# Patient Record
Sex: FEMALE | Race: WHITE | NOT HISPANIC OR LATINO | Employment: FULL TIME | URBAN - METROPOLITAN AREA
[De-identification: names, ages, dates, MRNs, and addresses within clinical notes are randomized per-mention and may not be internally consistent; named-entity substitution may affect disease eponyms.]

---

## 2016-12-27 LAB
EXTERNAL HIV SCREEN: NORMAL
HCV AB SER-ACNC: NEGATIVE

## 2019-10-11 ENCOUNTER — HOSPITAL ENCOUNTER (EMERGENCY)
Facility: HOSPITAL | Age: 39
Discharge: HOME | End: 2019-10-11
Attending: EMERGENCY MEDICINE
Payer: COMMERCIAL

## 2019-10-11 ENCOUNTER — APPOINTMENT (EMERGENCY)
Dept: RADIOLOGY | Facility: HOSPITAL | Age: 39
End: 2019-10-11
Payer: COMMERCIAL

## 2019-10-11 VITALS
BODY MASS INDEX: 21.28 KG/M2 | OXYGEN SATURATION: 100 % | HEIGHT: 67 IN | HEART RATE: 108 BPM | SYSTOLIC BLOOD PRESSURE: 102 MMHG | WEIGHT: 135.58 LBS | DIASTOLIC BLOOD PRESSURE: 62 MMHG | RESPIRATION RATE: 18 BRPM | TEMPERATURE: 98.8 F

## 2019-10-11 DIAGNOSIS — E87.6 HYPOKALEMIA: ICD-10-CM

## 2019-10-11 DIAGNOSIS — R00.2 PALPITATIONS: Primary | ICD-10-CM

## 2019-10-11 LAB
ALBUMIN SERPL BCP-MCNC: 3.6 G/DL (ref 3.5–5)
ALP SERPL-CCNC: 52 U/L (ref 46–116)
ALT SERPL W P-5'-P-CCNC: 24 U/L (ref 12–78)
ANION GAP SERPL CALCULATED.3IONS-SCNC: 10 MMOL/L (ref 4–13)
AST SERPL W P-5'-P-CCNC: 20 U/L (ref 5–45)
ATRIAL RATE: 124 BPM
BASOPHILS # BLD AUTO: 0.03 THOUSANDS/ΜL (ref 0–0.1)
BASOPHILS NFR BLD AUTO: 1 % (ref 0–1)
BILIRUB SERPL-MCNC: 0.3 MG/DL (ref 0.2–1)
BUN SERPL-MCNC: 10 MG/DL (ref 5–25)
CALCIUM SERPL-MCNC: 8.4 MG/DL (ref 8.3–10.1)
CHLORIDE SERPL-SCNC: 106 MMOL/L (ref 100–108)
CO2 SERPL-SCNC: 26 MMOL/L (ref 21–32)
CREAT SERPL-MCNC: 0.88 MG/DL (ref 0.6–1.3)
EOSINOPHIL # BLD AUTO: 0.3 THOUSAND/ΜL (ref 0–0.61)
EOSINOPHIL NFR BLD AUTO: 5 % (ref 0–6)
ERYTHROCYTE [DISTWIDTH] IN BLOOD BY AUTOMATED COUNT: 12.2 % (ref 11.6–15.1)
EXT PREG TEST URINE: NEGATIVE
EXT. CONTROL ED NAV: NORMAL
GFR SERPL CREATININE-BSD FRML MDRD: 84 ML/MIN/1.73SQ M
GLUCOSE SERPL-MCNC: 106 MG/DL (ref 65–140)
HCT VFR BLD AUTO: 39.2 % (ref 34.8–46.1)
HGB BLD-MCNC: 12.8 G/DL (ref 11.5–15.4)
IMM GRANULOCYTES # BLD AUTO: 0.02 THOUSAND/UL (ref 0–0.2)
IMM GRANULOCYTES NFR BLD AUTO: 0 % (ref 0–2)
LYMPHOCYTES # BLD AUTO: 2.03 THOUSANDS/ΜL (ref 0.6–4.47)
LYMPHOCYTES NFR BLD AUTO: 33 % (ref 14–44)
MCH RBC QN AUTO: 31 PG (ref 26.8–34.3)
MCHC RBC AUTO-ENTMCNC: 32.7 G/DL (ref 31.4–37.4)
MCV RBC AUTO: 95 FL (ref 82–98)
MONOCYTES # BLD AUTO: 0.7 THOUSAND/ΜL (ref 0.17–1.22)
MONOCYTES NFR BLD AUTO: 11 % (ref 4–12)
NEUTROPHILS # BLD AUTO: 3.04 THOUSANDS/ΜL (ref 1.85–7.62)
NEUTS SEG NFR BLD AUTO: 50 % (ref 43–75)
NRBC BLD AUTO-RTO: 0 /100 WBCS
P AXIS: 77 DEGREES
PLATELET # BLD AUTO: 176 THOUSANDS/UL (ref 149–390)
PMV BLD AUTO: 10.8 FL (ref 8.9–12.7)
POTASSIUM SERPL-SCNC: 3 MMOL/L (ref 3.5–5.3)
PR INTERVAL: 158 MS
PROT SERPL-MCNC: 6.5 G/DL (ref 6.4–8.2)
QRS AXIS: 48 DEGREES
QRSD INTERVAL: 70 MS
QT INTERVAL: 284 MS
QTC INTERVAL: 408 MS
RBC # BLD AUTO: 4.13 MILLION/UL (ref 3.81–5.12)
SODIUM SERPL-SCNC: 142 MMOL/L (ref 136–145)
T WAVE AXIS: 71 DEGREES
TSH SERPL DL<=0.05 MIU/L-ACNC: 1.68 UIU/ML (ref 0.36–3.74)
VENTRICULAR RATE: 124 BPM
WBC # BLD AUTO: 6.12 THOUSAND/UL (ref 4.31–10.16)

## 2019-10-11 PROCEDURE — 93010 ELECTROCARDIOGRAM REPORT: CPT | Performed by: INTERNAL MEDICINE

## 2019-10-11 PROCEDURE — 93005 ELECTROCARDIOGRAM TRACING: CPT

## 2019-10-11 PROCEDURE — 80053 COMPREHEN METABOLIC PANEL: CPT | Performed by: EMERGENCY MEDICINE

## 2019-10-11 PROCEDURE — 81025 URINE PREGNANCY TEST: CPT | Performed by: EMERGENCY MEDICINE

## 2019-10-11 PROCEDURE — 71046 X-RAY EXAM CHEST 2 VIEWS: CPT

## 2019-10-11 PROCEDURE — 84443 ASSAY THYROID STIM HORMONE: CPT | Performed by: EMERGENCY MEDICINE

## 2019-10-11 PROCEDURE — 96374 THER/PROPH/DIAG INJ IV PUSH: CPT

## 2019-10-11 PROCEDURE — 99285 EMERGENCY DEPT VISIT HI MDM: CPT

## 2019-10-11 PROCEDURE — 99284 EMERGENCY DEPT VISIT MOD MDM: CPT | Performed by: EMERGENCY MEDICINE

## 2019-10-11 PROCEDURE — 85025 COMPLETE CBC W/AUTO DIFF WBC: CPT | Performed by: EMERGENCY MEDICINE

## 2019-10-11 PROCEDURE — 96361 HYDRATE IV INFUSION ADD-ON: CPT

## 2019-10-11 PROCEDURE — 36415 COLL VENOUS BLD VENIPUNCTURE: CPT | Performed by: EMERGENCY MEDICINE

## 2019-10-11 RX ORDER — METHYLPHENIDATE HYDROCHLORIDE 18 MG/1
18 TABLET ORAL DAILY
COMMUNITY
End: 2021-05-17

## 2019-10-11 RX ORDER — POTASSIUM CHLORIDE 20 MEQ/1
40 TABLET, EXTENDED RELEASE ORAL ONCE
Status: COMPLETED | OUTPATIENT
Start: 2019-10-11 | End: 2019-10-11

## 2019-10-11 RX ORDER — LORAZEPAM 2 MG/ML
0.5 INJECTION INTRAMUSCULAR ONCE
Status: COMPLETED | OUTPATIENT
Start: 2019-10-11 | End: 2019-10-11

## 2019-10-11 RX ADMIN — SODIUM CHLORIDE 1000 ML: 0.9 INJECTION, SOLUTION INTRAVENOUS at 06:38

## 2019-10-11 RX ADMIN — POTASSIUM CHLORIDE 40 MEQ: 1500 TABLET, EXTENDED RELEASE ORAL at 07:10

## 2019-10-11 RX ADMIN — LORAZEPAM 0.5 MG: 2 INJECTION INTRAMUSCULAR; INTRAVENOUS at 06:31

## 2019-10-11 NOTE — ED PROVIDER NOTES
History  Chief Complaint   Patient presents with    Shortness of Breath     per pt "she woke up with her heart racing in her chest about 2 hrs ago pt also complains of some sore throat with a Hx  of low patassium "     Patient is a 18-year-old female with no significant past medical history who presents with palpitations  Patient states that she woke up early this morning with palpitations  She feels as though her heart is racing  She has an apple watch and it showed a heart rate in the 140s  Patient states she normally has a low heart rate and low blood pressure  She was also concerned with her elevated blood pressure in triage  She denies associated chest pain, including pleuritic chest pain  She does state that she feels short of breath when her heart rate is significantly elevated  She denies nausea, vomiting, fever, chills, lower extremity edema, lower extremity pain or other complaints  She denies a history of recent travel, recent surgeries, exogenous estrogen or DVT/PE history  Patient does state that she took Concerta for the 1st time earlier in the day  She also feels as though she is coming down with a cold and has multiple sick contacts at home  She did take an over-the-counter antihistamine yesterday  History provided by:  Patient  Palpitations   Palpitations quality:  Fast  Onset quality:  Sudden  Duration:  2 hours  Timing:  Constant  Progression:  Unchanged  Chronicity:  New  Context: stimulant use    Ineffective treatments:  Deep relaxation  Associated symptoms: no back pain, no chest pain, no chest pressure, no cough, no diaphoresis, no dizziness, no leg pain, no lower extremity edema, no nausea, no shortness of breath and no vomiting        Prior to Admission Medications   Prescriptions Last Dose Informant Patient Reported? Taking?    methylphenidate (CONCERTA) 18 mg ER tablet   Yes Yes   Sig: Take 18 mg by mouth daily      Facility-Administered Medications: None       Past Medical History:   Diagnosis Date    UTI (urinary tract infection)     UTI group B Strep x 2 5 months ago       Past Surgical History:   Procedure Laterality Date    JOINT REPLACEMENT      right shoulder dislocation repair       History reviewed  No pertinent family history  I have reviewed and agree with the history as documented  Social History     Tobacco Use    Smoking status: Current Every Day Smoker     Types: E-Cigarettes    Smokeless tobacco: Current User   Substance Use Topics    Alcohol use: No    Drug use: No        Review of Systems   Constitutional: Negative for chills, diaphoresis and fever  HENT: Negative for nosebleeds, sore throat and trouble swallowing  Eyes: Negative for photophobia, pain and visual disturbance  Respiratory: Negative for cough, chest tightness and shortness of breath  Cardiovascular: Positive for palpitations  Negative for chest pain and leg swelling  Gastrointestinal: Negative for abdominal pain, constipation, diarrhea, nausea and vomiting  Endocrine: Negative for polydipsia and polyuria  Genitourinary: Negative for difficulty urinating, dysuria, hematuria, pelvic pain, vaginal bleeding and vaginal discharge  Musculoskeletal: Negative for back pain, neck pain and neck stiffness  Skin: Negative for pallor and rash  Neurological: Negative for dizziness, seizures, light-headedness and headaches  All other systems reviewed and are negative  Physical Exam  Physical Exam   Constitutional: She is oriented to person, place, and time  She appears well-developed and well-nourished  No distress  HENT:   Head: Normocephalic and atraumatic  Mouth/Throat: Oropharynx is clear and moist and mucous membranes are normal    Eyes: Pupils are equal, round, and reactive to light  EOM are normal    Neck: Normal range of motion  Neck supple  Cardiovascular: Regular rhythm, normal heart sounds, intact distal pulses and normal pulses  Tachycardia present  Pulmonary/Chest: Effort normal and breath sounds normal  No respiratory distress  Abdominal: Soft  She exhibits no distension  There is no tenderness  There is no rigidity, no rebound and no guarding  Musculoskeletal: Normal range of motion  She exhibits no edema or tenderness  Lymphadenopathy:     She has no cervical adenopathy  Neurological: She is alert and oriented to person, place, and time  She has normal strength  No cranial nerve deficit or sensory deficit  Skin: Skin is warm and dry  Capillary refill takes less than 2 seconds  Psychiatric: Her mood appears anxious  Tearful on exam   Nursing note and vitals reviewed        Vital Signs  ED Triage Vitals [10/11/19 0556]   Temperature Pulse Respirations Blood Pressure SpO2   98 8 °F (37 1 °C) (!) 129 18 142/87 100 %      Temp Source Heart Rate Source Patient Position - Orthostatic VS BP Location FiO2 (%)   Oral Monitor Lying Right arm --      Pain Score       4           Vitals:    10/11/19 0556 10/11/19 0600 10/11/19 0700 10/11/19 0715   BP: 142/87 131/78 113/62 102/62   Pulse: (!) 129 (!) 132 (!) 110 (!) 108   Patient Position - Orthostatic VS: Lying Sitting Sitting          Visual Acuity      ED Medications  Medications   sodium chloride 0 9 % bolus 1,000 mL (1,000 mL Intravenous New Bag 10/11/19 0638)   LORazepam (ATIVAN) 2 mg/mL injection 0 5 mg (0 5 mg Intravenous Given 10/11/19 0631)   potassium chloride (K-DUR,KLOR-CON) CR tablet 40 mEq (40 mEq Oral Given 10/11/19 0710)       Diagnostic Studies  Results Reviewed     Procedure Component Value Units Date/Time    TSH [[id number]]     Lab Status:  No result Specimen:  Blood     POCT pregnancy, urine [[id number]]  (Normal) Resulted:  10/11/19 0652    Lab Status:  Final result Updated:  10/11/19 0657     EXT PREG TEST UR (Ref: Negative) NEGATIVE     Control Valid    Comprehensive metabolic panel [[id number]]  (Abnormal) Collected:  10/11/19 0630    Lab Status:  Final result Specimen:  Blood from Arm, Right Updated:  10/11/19 0651     Sodium 142 mmol/L      Potassium 3 0 mmol/L      Chloride 106 mmol/L      CO2 26 mmol/L      ANION GAP 10 mmol/L      BUN 10 mg/dL      Creatinine 0 88 mg/dL      Glucose 106 mg/dL      Calcium 8 4 mg/dL      AST 20 U/L      ALT 24 U/L      Alkaline Phosphatase 52 U/L      Total Protein 6 5 g/dL      Albumin 3 6 g/dL      Total Bilirubin 0 30 mg/dL      eGFR 84 ml/min/1 73sq m     Narrative:       [place] guidelines for Chronic Kidney Disease (CKD):     Stage 1 with normal or high GFR (GFR > 90 mL/min/1 73 square meters)    Stage 2 Mild CKD (GFR = 60-89 mL/min/1 73 square meters)    Stage 3A Moderate CKD (GFR = 45-59 mL/min/1 73 square meters)    Stage 3B Moderate CKD (GFR = 30-44 mL/min/1 73 square meters)    Stage 4 Severe CKD (GFR = 15-29 mL/min/1 73 square meters)    Stage 5 End Stage CKD (GFR <15 mL/min/1 73 square meters)  Note: GFR calculation is accurate only with a steady state creatinine    CBC and differential [[id number]] Collected:  10/11/19 0630    Lab Status:  Final result Specimen:  Blood from Arm, Right Updated:  10/11/19 0636     WBC 6 12 Thousand/uL      RBC 4 13 Million/uL      Hemoglobin 12 8 g/dL      Hematocrit 39 2 %      MCV 95 fL      MCH 31 0 pg      MCHC 32 7 g/dL      RDW 12 2 %      MPV 10 8 fL      Platelets 944 Thousands/uL      nRBC 0 /100 WBCs      Neutrophils Relative 50 %      Immat GRANS % 0 %      Lymphocytes Relative 33 %      Monocytes Relative 11 %      Eosinophils Relative 5 %      Basophils Relative 1 %      Neutrophils Absolute 3 04 Thousands/µL      Immature Grans Absolute 0 02 Thousand/uL      Lymphocytes Absolute 2 03 Thousands/µL      Monocytes Absolute 0 70 Thousand/µL      Eosinophils Absolute 0 30 Thousand/µL      Basophils Absolute 0 03 Thousands/µL                  XR chest 2 views   ED Interpretation by Summer Valentin DO (10/11 0705)   No infiltrates  No cardiomegaly  Procedures  ECG 12 Lead Documentation Only  Date/Time: 10/11/2019 6:49 AM  Performed by: Nciole Vazquez DO  Authorized by: Nicole Vazquez DO     ECG reviewed by me, the ED Provider: yes    Patient location:  ED  Previous ECG:     Previous ECG:  Compared to current    Similarity:  Changes noted    Comparison to cardiac monitor: Yes    Comments:      Sinus tachycardia at a rate of 124 beats per minute  Normal intervals  Normal axis  Normal QRS  No ST T wave abnormalities  Rate change from previous EKG from 06/11/2005  ED Course  ED Course as of Oct 11 0808   Fri Oct 11, 2019   0725 Patient's heart rate currently 98  She does continue to appear anxious  Although she states she feels better after the Ativan  P o  Potassium given  0755 Heart rate currently 94 bpm                                   MDM  Number of Diagnoses or Management Options  Hypokalemia: new and requires workup  Palpitations: new and requires workup  Diagnosis management comments: Patient presents with palpitations  She admits to stimulant an antihistamine use earlier in the day  She also appears anxious and admits to anxiety at this time  She was given IV fluids and a dose of Ativan with significant improvement in her heart rate  Her heart rate at this time is less than 100  Her blood pressure is also return to her normal   Labs reveal mild hypokalemia  Patient was given p o  Potassium and provided with instructions for dietary modifications  Do not suspect PE as cause of symptoms  She is not short of breath at this time and has no chest pain  She is not hypoxic or tachypneic  There is a diagnosis that is far more likely than PE  I suspect medication and anxiety as cause of palpitations  She is currently asymptomatic and comfortable with further observation at home  She is stable for discharge at this time         Amount and/or Complexity of Data Reviewed  Clinical lab tests: reviewed and ordered  Tests in the radiology section of CPT®: ordered and reviewed  Tests in the medicine section of CPT®: ordered and reviewed  Review and summarize past medical records: yes  Independent visualization of images, tracings, or specimens: yes    Risk of Complications, Morbidity, and/or Mortality  Presenting problems: moderate  Diagnostic procedures: moderate  Management options: moderate    Patient Progress  Patient progress: improved      Disposition  Final diagnoses:   Palpitations   Hypokalemia     Time reflects when diagnosis was documented in both MDM as applicable and the Disposition within this note     Time User Action Codes Description Comment    10/11/2019  7:54 AM Steven TOVAR Add [R00 2] Palpitations     10/11/2019  7:54 AM Lore Mar Add [E87 6] Hypokalemia       ED Disposition     ED Disposition Condition Date/Time Comment    Discharge Stable Fri Oct 11, 2019  7:53 AM Tammy Dee discharge to home/self care  Follow-up Information     Follow up With Specialties Details Why Contact Info    [facility]  Schedule an appointment as soon as possible for a visit  Return to ED sooner if symptoms worsen or persist  [phone]            Patient's Medications   Discharge Prescriptions    No medications on file     No discharge procedures on file      ED Provider  Electronically Signed by           Madhuri Camara DO  10/11/19 2014

## 2019-12-16 ENCOUNTER — OFFICE VISIT (OUTPATIENT)
Dept: OBGYN CLINIC | Facility: CLINIC | Age: 39
End: 2019-12-16

## 2019-12-16 VITALS
HEART RATE: 103 BPM | HEIGHT: 67 IN | SYSTOLIC BLOOD PRESSURE: 104 MMHG | DIASTOLIC BLOOD PRESSURE: 69 MMHG | BODY MASS INDEX: 20.4 KG/M2 | WEIGHT: 130 LBS

## 2019-12-16 DIAGNOSIS — M79.18 CERVICAL MYOFASCIAL PAIN SYNDROME: ICD-10-CM

## 2019-12-16 DIAGNOSIS — M54.2 NECK PAIN: Primary | ICD-10-CM

## 2019-12-16 PROCEDURE — 99243 OFF/OP CNSLTJ NEW/EST LOW 30: CPT | Performed by: PHYSICAL MEDICINE & REHABILITATION

## 2019-12-16 RX ORDER — ATOMOXETINE 40 MG/1
40 CAPSULE ORAL DAILY
Refills: 0 | COMMUNITY
Start: 2019-10-28 | End: 2021-05-17

## 2019-12-16 RX ORDER — IRON,CARBONYL/ASCORBIC ACID 100-250 MG
TABLET ORAL DAILY
COMMUNITY
End: 2021-08-23

## 2019-12-16 RX ORDER — GABAPENTIN 300 MG/1
300 CAPSULE ORAL 3 TIMES DAILY
COMMUNITY
Start: 2019-04-22 | End: 2021-05-17

## 2019-12-16 RX ORDER — CALCIUM CARBONATE 260MG(650)
296 TABLET,CHEWABLE ORAL DAILY
COMMUNITY

## 2019-12-16 RX ORDER — ATOMOXETINE 60 MG/1
60 CAPSULE ORAL DAILY
COMMUNITY
Start: 2019-12-03 | End: 2021-05-17

## 2019-12-16 RX ORDER — CHLORAL HYDRATE 500 MG
1000 CAPSULE ORAL DAILY
COMMUNITY

## 2019-12-16 RX ORDER — CYCLOBENZAPRINE HCL 5 MG
5 TABLET ORAL
Qty: 20 TABLET | Refills: 0 | Status: SHIPPED | OUTPATIENT
Start: 2019-12-16 | End: 2021-05-17
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
## 2020-07-02 ENCOUNTER — NURSE TRIAGE (OUTPATIENT)
Dept: OTHER | Facility: OTHER | Age: 40
End: 2020-07-02

## 2020-07-02 DIAGNOSIS — Z20.828 SARS-ASSOCIATED CORONAVIRUS EXPOSURE: ICD-10-CM

## 2020-07-02 DIAGNOSIS — Z20.828 SARS-ASSOCIATED CORONAVIRUS EXPOSURE: Primary | ICD-10-CM

## 2020-07-02 NOTE — TELEPHONE ENCOUNTER
Regarding: Coronavirus  ----- Message from Arpita Loo sent at 7/2/2020 12:42 PM EDT -----  Patient called stating she has a fever of 100 7, a sore throat, and SOB  She would like to be tested for COVID  Please call patient back

## 2020-07-02 NOTE — TELEPHONE ENCOUNTER
Reason for Disposition   [1] COVID-19 infection suspected by caller or triager AND [2] mild symptoms (cough, fever, or others) AND [2] no complications or SOB    Answer Assessment - Initial Assessment Questions  1  COVID-19 DIAGNOSIS: "Who made your Coronavirus (COVID-19) diagnosis?" "Was it confirmed by a positive lab test?" If not diagnosed by a HCP, ask "Are there lots of cases (community spread) where you live?" (See public health department website, if unsure)      Community  2  ONSET: "When did the COVID-19 symptoms start?"       Community  3  WORST SYMPTOM: "What is your worst symptom?" (e g , cough, fever, shortness of breath, muscle aches)      Sore throat  4  COUGH: "Do you have a cough?" If so, ask: "How bad is the cough?"        Denies  5  FEVER: "Do you have a fever?" If so, ask: "What is your temperature, how was it measured, and when did it start?"      100 7 (oral) this am  6  RESPIRATORY STATUS: "Describe your breathing?" (e g , shortness of breath, wheezing, unable to speak)       Unremarkable - able to converse  7  BETTER-SAME-WORSE: "Are you getting better, staying the same or getting worse compared to yesterday?"  If getting worse, ask, "In what way?"      Same  8  HIGH RISK DISEASE: "Do you have any chronic medical problems?" (e g , asthma, heart or lung disease, weak immune system, etc )      Denies  9   PREGNANCY: "Is there any chance you are pregnant?" "When was your last menstrual period?"      6/6  10  OTHER SYMPTOMS: "Do you have any other symptoms?"  (e g , chills, fatigue, headache, loss of smell or taste, muscle pain, sore throat)        Fatigue, headache, myalgias    Protocols used: CORONAVIRUS (COVID-19) DIAGNOSED OR SUSPECTED-ADULT-OH

## 2020-11-21 ENCOUNTER — NURSE TRIAGE (OUTPATIENT)
Dept: OTHER | Facility: OTHER | Age: 40
End: 2020-11-21

## 2020-11-21 DIAGNOSIS — Z20.828 EXPOSURE TO SARS-ASSOCIATED CORONAVIRUS: Primary | ICD-10-CM

## 2021-04-22 ENCOUNTER — HOSPITAL ENCOUNTER (EMERGENCY)
Facility: HOSPITAL | Age: 41
Discharge: HOME | End: 2021-04-23
Attending: EMERGENCY MEDICINE | Admitting: EMERGENCY MEDICINE
Payer: COMMERCIAL

## 2021-04-22 DIAGNOSIS — N39.0 UTI (URINARY TRACT INFECTION): Primary | ICD-10-CM

## 2021-04-22 DIAGNOSIS — R07.9 CHEST PAIN: ICD-10-CM

## 2021-04-22 DIAGNOSIS — R20.2 PARESTHESIAS IN RIGHT HAND: ICD-10-CM

## 2021-04-22 LAB
ANION GAP SERPL CALCULATED.3IONS-SCNC: 5 MMOL/L (ref 4–13)
BASOPHILS # BLD AUTO: 0.04 THOUSANDS/ΜL (ref 0–0.1)
BASOPHILS NFR BLD AUTO: 1 % (ref 0–1)
BUN SERPL-MCNC: 15 MG/DL (ref 5–25)
CALCIUM SERPL-MCNC: 9 MG/DL (ref 8.3–10.1)
CHLORIDE SERPL-SCNC: 106 MMOL/L (ref 100–108)
CO2 SERPL-SCNC: 25 MMOL/L (ref 21–32)
CREAT SERPL-MCNC: 0.87 MG/DL (ref 0.6–1.3)
EOSINOPHIL # BLD AUTO: 0.13 THOUSAND/ΜL (ref 0–0.61)
EOSINOPHIL NFR BLD AUTO: 3 % (ref 0–6)
ERYTHROCYTE [DISTWIDTH] IN BLOOD BY AUTOMATED COUNT: 12.1 % (ref 11.6–15.1)
GFR SERPL CREATININE-BSD FRML MDRD: 84 ML/MIN/1.73SQ M
GLUCOSE SERPL-MCNC: 85 MG/DL (ref 65–140)
HCT VFR BLD AUTO: 37 % (ref 34.8–46.1)
HGB BLD-MCNC: 12.5 G/DL (ref 11.5–15.4)
IMM GRANULOCYTES # BLD AUTO: 0.02 THOUSAND/UL (ref 0–0.2)
IMM GRANULOCYTES NFR BLD AUTO: 0 % (ref 0–2)
LYMPHOCYTES # BLD AUTO: 1.51 THOUSANDS/ΜL (ref 0.6–4.47)
LYMPHOCYTES NFR BLD AUTO: 32 % (ref 14–44)
MCH RBC QN AUTO: 32.3 PG (ref 26.8–34.3)
MCHC RBC AUTO-ENTMCNC: 33.8 G/DL (ref 31.4–37.4)
MCV RBC AUTO: 96 FL (ref 82–98)
MONOCYTES # BLD AUTO: 0.59 THOUSAND/ΜL (ref 0.17–1.22)
MONOCYTES NFR BLD AUTO: 12 % (ref 4–12)
NEUTROPHILS # BLD AUTO: 2.49 THOUSANDS/ΜL (ref 1.85–7.62)
NEUTS SEG NFR BLD AUTO: 52 % (ref 43–75)
NRBC BLD AUTO-RTO: 0 /100 WBCS
PLATELET # BLD AUTO: 242 THOUSANDS/UL (ref 149–390)
PMV BLD AUTO: 10.5 FL (ref 8.9–12.7)
POTASSIUM SERPL-SCNC: 4.8 MMOL/L (ref 3.5–5.3)
RBC # BLD AUTO: 3.87 MILLION/UL (ref 3.81–5.12)
SODIUM SERPL-SCNC: 136 MMOL/L (ref 136–145)
TROPONIN I SERPL-MCNC: <0.02 NG/ML
WBC # BLD AUTO: 4.78 THOUSAND/UL (ref 4.31–10.16)

## 2021-04-22 PROCEDURE — 85025 COMPLETE CBC W/AUTO DIFF WBC: CPT | Performed by: EMERGENCY MEDICINE

## 2021-04-22 PROCEDURE — 93005 ELECTROCARDIOGRAM TRACING: CPT

## 2021-04-22 PROCEDURE — 81025 URINE PREGNANCY TEST: CPT | Performed by: EMERGENCY MEDICINE

## 2021-04-22 PROCEDURE — 80048 BASIC METABOLIC PNL TOTAL CA: CPT | Performed by: EMERGENCY MEDICINE

## 2021-04-22 PROCEDURE — 99284 EMERGENCY DEPT VISIT MOD MDM: CPT | Performed by: EMERGENCY MEDICINE

## 2021-04-22 PROCEDURE — 36415 COLL VENOUS BLD VENIPUNCTURE: CPT | Performed by: EMERGENCY MEDICINE

## 2021-04-22 PROCEDURE — 84484 ASSAY OF TROPONIN QUANT: CPT | Performed by: EMERGENCY MEDICINE

## 2021-04-22 PROCEDURE — 99284 EMERGENCY DEPT VISIT MOD MDM: CPT

## 2021-04-22 RX ORDER — SODIUM CHLORIDE 9 MG/ML
3 INJECTION INTRAVENOUS
Status: DISCONTINUED | OUTPATIENT
Start: 2021-04-22 | End: 2021-04-23 | Stop reason: HOSPADM

## 2021-04-23 ENCOUNTER — APPOINTMENT (EMERGENCY)
Dept: RADIOLOGY | Facility: HOSPITAL | Age: 41
End: 2021-04-23
Payer: COMMERCIAL

## 2021-04-23 VITALS
OXYGEN SATURATION: 98 % | DIASTOLIC BLOOD PRESSURE: 83 MMHG | WEIGHT: 125 LBS | RESPIRATION RATE: 16 BRPM | TEMPERATURE: 97.9 F | SYSTOLIC BLOOD PRESSURE: 123 MMHG | BODY MASS INDEX: 19.58 KG/M2 | HEART RATE: 90 BPM

## 2021-04-23 LAB
ATRIAL RATE: 63 BPM
BACTERIA UR QL AUTO: ABNORMAL /HPF
BILIRUB UR QL STRIP: ABNORMAL
CLARITY UR: CLEAR
CLARITY, POC: CLEAR
COLOR UR: ABNORMAL
EXT PREG TEST URINE: NEGATIVE
EXT. CONTROL ED NAV: NORMAL
GLUCOSE UR STRIP-MCNC: ABNORMAL MG/DL
HGB UR QL STRIP.AUTO: ABNORMAL
HYALINE CASTS #/AREA URNS LPF: ABNORMAL /LPF
KETONES UR STRIP-MCNC: ABNORMAL MG/DL
LEUKOCYTE ESTERASE UR QL STRIP: ABNORMAL
NITRITE UR QL STRIP: POSITIVE
NON-SQ EPI CELLS URNS QL MICRO: ABNORMAL /HPF
P AXIS: 74 DEGREES
PH UR STRIP.AUTO: 5 [PH] (ref 4.5–8)
PR INTERVAL: 168 MS
PROT UR STRIP-MCNC: ABNORMAL MG/DL
QRS AXIS: 71 DEGREES
QRSD INTERVAL: 80 MS
QT INTERVAL: 402 MS
QTC INTERVAL: 411 MS
RBC #/AREA URNS AUTO: ABNORMAL /HPF
SP GR UR STRIP.AUTO: 1.02 (ref 1–1.03)
T WAVE AXIS: 66 DEGREES
UROBILINOGEN UR QL STRIP.AUTO: 1 E.U./DL
VENTRICULAR RATE: 63 BPM
WBC #/AREA URNS AUTO: ABNORMAL /HPF

## 2021-04-23 PROCEDURE — 87077 CULTURE AEROBIC IDENTIFY: CPT

## 2021-04-23 PROCEDURE — 81001 URINALYSIS AUTO W/SCOPE: CPT

## 2021-04-23 PROCEDURE — 87186 SC STD MICRODIL/AGAR DIL: CPT

## 2021-04-23 PROCEDURE — 93010 ELECTROCARDIOGRAM REPORT: CPT | Performed by: INTERNAL MEDICINE

## 2021-04-23 PROCEDURE — 71045 X-RAY EXAM CHEST 1 VIEW: CPT

## 2021-04-23 PROCEDURE — 87086 URINE CULTURE/COLONY COUNT: CPT

## 2021-04-23 RX ORDER — CEPHALEXIN 250 MG/1
500 CAPSULE ORAL ONCE
Status: COMPLETED | OUTPATIENT
Start: 2021-04-23 | End: 2021-04-23

## 2021-04-23 RX ORDER — CEPHALEXIN 250 MG/1
500 CAPSULE ORAL EVERY 6 HOURS SCHEDULED
Qty: 80 CAPSULE | Refills: 0 | Status: SHIPPED | OUTPATIENT
Start: 2021-04-23 | End: 2021-05-03

## 2021-04-23 RX ADMIN — CEPHALEXIN 500 MG: 250 CAPSULE ORAL at 00:30

## 2021-04-23 NOTE — ED PROVIDER NOTES
History  Chief Complaint   Patient presents with    Hand Pain     pt c/o right hand pain and numbness, also reports cp but states she feels very anxious  73-year-old right-hand-dominant female presents emergency department for right hand paraesthesias  Patient says that yesterday she developed sensation of right wrist drop  She developed paresthesias right hand abdomen persistent since onset  Today she has also had some chest pain to the center of her chest radiating into her right shoulder  Chest pain is constant, sharp, attributed to anxiety by patient, no modifying factors  Also complains that she has had dysuria and right flank pain since this morning  Has a history of UTIs and says this feels like a UTI  Denies fever, chills, cough, chest pain, SOB, leg pain/swelling, n/v/d, abdominal pain, headache, any other complaints  Patient denies smoking history  No personal or family hx of cardiac disease  No use of exogenous estrogen  Prior to Admission Medications   Prescriptions Last Dose Informant Patient Reported? Taking?    Calcium Carb-Cholecalciferol (CALCIUM 1000 + D) 1000-800 MG-UNIT TABS   Yes No   Sig: calcium   Iron-Vitamin C (IRON 100/C) 100-250 MG TABS   Yes No   Sig: Virt-PN DHA 27 mg iron-1 mg-300 mg capsule   take 1 capsule by mouth once daily   Magnesium Citrate 100 MG TABS   Yes No   Sig: Take 296 mL by mouth   Multiple Vitamins-Minerals (MULTIVITAMIN ADULT PO)   Yes No   Sig: Take by mouth daily   Omega-3 Fatty Acids (FISH OIL) 1,000 mg   Yes No   Sig: Fish Oil   amitriptyline (ELAVIL) 25 mg tablet   Yes No   Sig: Take 25 mg by mouth   atoMOXetine (STRATTERA) 40 mg capsule   Yes No   Sig: Take 40 mg by mouth daily   atoMOXetine (STRATTERA) 60 mg capsule   Yes No   Sig: Take 60 mg by mouth daily   cyclobenzaprine (FLEXERIL) 5 mg tablet   No No   Sig: Take 1 tablet (5 mg total) by mouth daily at bedtime as needed for muscle spasms   gabapentin (NEURONTIN) 300 mg capsule   Yes No Sig: Take 300 mg by mouth Three times a day   methylphenidate (CONCERTA) 18 mg ER tablet   Yes No   Sig: Take 18 mg by mouth daily      Facility-Administered Medications: None       Past Medical History:   Diagnosis Date    UTI (urinary tract infection)     UTI group B Strep x 2 5 months ago       Past Surgical History:   Procedure Laterality Date    JOINT REPLACEMENT      right shoulder dislocation repair       History reviewed  No pertinent family history  I have reviewed and agree with the history as documented  E-Cigarette/Vaping     E-Cigarette/Vaping Substances     Social History     Tobacco Use    Smoking status: Current Every Day Smoker     Types: E-Cigarettes    Smokeless tobacco: Current User   Substance Use Topics    Alcohol use: No    Drug use: No        Review of Systems   Constitutional: Negative  Negative for chills and fever  HENT: Negative  Negative for rhinorrhea  Eyes: Negative  Respiratory: Negative  Negative for cough and shortness of breath  Cardiovascular: Positive for chest pain  Negative for leg swelling  Gastrointestinal: Negative  Negative for abdominal pain, diarrhea, nausea and vomiting  Genitourinary: Negative  Negative for dysuria, flank pain and frequency  Musculoskeletal: Negative  Negative for back pain and neck pain  Skin: Negative  Negative for rash  Neurological: Positive for numbness  Negative for light-headedness and headaches  All other systems reviewed and are negative        Physical Exam  ED Triage Vitals [04/22/21 2147]   Temperature Pulse Respirations Blood Pressure SpO2   97 9 °F (36 6 °C) 90 16 125/75 98 %      Temp Source Heart Rate Source Patient Position - Orthostatic VS BP Location FiO2 (%)   Oral Monitor Lying Right arm --      Pain Score       5             Orthostatic Vital Signs  Vitals:    04/22/21 2147   BP: 125/75   Pulse: 90   Patient Position - Orthostatic VS: Lying       Physical Exam  Vitals signs and nursing note reviewed  Constitutional:       General: She is not in acute distress  Appearance: She is well-developed  HENT:      Head: Normocephalic and atraumatic  Mouth/Throat:      Mouth: Mucous membranes are moist    Eyes:      Pupils: Pupils are equal, round, and reactive to light  Neck:      Musculoskeletal: Normal range of motion and neck supple  Cardiovascular:      Rate and Rhythm: Normal rate and regular rhythm  Pulses: Normal pulses  Radial pulses are 2+ on the right side and 2+ on the left side  Heart sounds: Normal heart sounds  No murmur  No friction rub  No gallop  Pulmonary:      Effort: Pulmonary effort is normal  No respiratory distress  Breath sounds: Normal breath sounds  No stridor  No wheezing, rhonchi or rales  Abdominal:      General: Abdomen is flat  Palpations: Abdomen is soft  Tenderness: There is no abdominal tenderness  There is no guarding or rebound  Musculoskeletal: Normal range of motion  General: No swelling or tenderness  Right lower leg: No edema  Left lower leg: No edema  Comments: Upper extremities:   M/U/R/A nerve sensation intact  Finger abduction/adduction/opposition intact  Suppination/Pronation intact without reproduction of pain  Able to 1+2 and 1+5 finger appose  Able to 2+3 finger cross  Good capillary refill  2+ radial pulses, bilaterally equal    BICEPS/TRICEPS 5/5  Shoulder abduction/adduction 5/5  Skin:     General: Skin is warm and dry  Capillary Refill: Capillary refill takes less than 2 seconds  Neurological:      Mental Status: She is alert and oriented to person, place, and time  Cranial Nerves: No cranial nerve deficit        Comments: Clear fluent speech         ED Medications  Medications   sodium chloride (PF) 0 9 % injection 3 mL (has no administration in time range)   cephalexin (KEFLEX) capsule 500 mg (500 mg Oral Given 4/23/21 0030)       Diagnostic Studies  Results Reviewed     Procedure Component Value Units Date/Time    Urine culture [[id number]] Collected: 04/23/21 0013    Lab Status: In process Specimen: Urine, Clean Catch Updated: 04/23/21 0019    Urine Microscopic [[id number]] Collected: 04/23/21 0013    Lab Status:  In process Specimen: Urine, Clean Catch Updated: 04/23/21 0019    POCT urinalysis dipstick [[id number]]  (Normal) Resulted: 04/23/21 0016    Lab Status: Final result Updated: 04/23/21 0016     Clarity, UA clear    POCT pregnancy, urine [[id number]]  (Normal) Resulted: 04/23/21 0015    Lab Status: Final result Updated: 04/23/21 0016     EXT PREG TEST UR (Ref: Negative) negative     Control valid    Urine Macroscopic, POC [[id number]]  (Abnormal) Collected: 04/23/21 0013    Lab Status: Final result Specimen: Urine Updated: 04/23/21 0014     Color, UA Orange     Clarity, UA Clear     pH, UA 5 0     Leukocytes, UA Trace     Nitrite, UA Positive     Protein, UA 30 (1+) mg/dl      Glucose,  (1/10%) mg/dl      Ketones, UA Trace mg/dl      Urobilinogen, UA 1 0 E U /dl      Bilirubin, UA Interference- unable to analyze     Blood, UA Large     Specific Gravity, UA 1 025    Narrative:      CLINITEK RESULT    CBC and differential [[id number]] Collected: 04/22/21 2233    Lab Status: Final result Specimen: Blood from Arm, Right Updated: 04/22/21 2332     WBC 4 78 Thousand/uL      RBC 3 87 Million/uL      Hemoglobin 12 5 g/dL      Hematocrit 37 0 %      MCV 96 fL      MCH 32 3 pg      MCHC 33 8 g/dL      RDW 12 1 %      MPV 10 5 fL      Platelets 960 Thousands/uL      nRBC 0 /100 WBCs      Neutrophils Relative 52 %      Immat GRANS % 0 %      Lymphocytes Relative 32 %      Monocytes Relative 12 %      Eosinophils Relative 3 %      Basophils Relative 1 %      Neutrophils Absolute 2 49 Thousands/µL      Immature Grans Absolute 0 02 Thousand/uL      Lymphocytes Absolute 1 51 Thousands/µL      Monocytes Absolute 0 59 Thousand/µL      Eosinophils Absolute 0 13 Thousand/µL      Basophils Absolute 0 04 Thousands/µL     Basic metabolic panel [[id number]] Collected: 04/22/21 2233    Lab Status: Final result Specimen: Blood from Arm, Right Updated: 04/22/21 2317     Sodium 136 mmol/L      Potassium 4 8 mmol/L      Chloride 106 mmol/L      CO2 25 mmol/L      ANION GAP 5 mmol/L      BUN 15 mg/dL      Creatinine 0 87 mg/dL      Glucose 85 mg/dL      Calcium 9 0 mg/dL      eGFR 84 ml/min/1 73sq m     Narrative:      [place] guidelines for Chronic Kidney Disease (CKD):     Stage 1 with normal or high GFR (GFR > 90 mL/min/1 73 square meters)    Stage 2 Mild CKD (GFR = 60-89 mL/min/1 73 square meters)    Stage 3A Moderate CKD (GFR = 45-59 mL/min/1 73 square meters)    Stage 3B Moderate CKD (GFR = 30-44 mL/min/1 73 square meters)    Stage 4 Severe CKD (GFR = 15-29 mL/min/1 73 square meters)    Stage 5 End Stage CKD (GFR <15 mL/min/1 73 square meters)  Note: GFR calculation is accurate only with a steady state creatinine    Troponin I [[id number]]  (Normal) Collected: 04/22/21 2233    Lab Status: Final result Specimen: Blood from Arm, Right Updated: 04/22/21 2311     Troponin I <0 02 ng/mL                  X-ray chest 1 view portable    (Results Pending)         Procedures  Procedures      ED Course                                       MDM  Number of Diagnoses or Management Options  Chest pain:   Paresthesias in right hand:   UTI (urinary tract infection):   Diagnosis management comments: 28-year-old right-hand-dominant female presents emergency department for right hand paraesthesias  Also complains of chest pain and urinary sx  Will obtain cardiac panel and UA to evaluate  Final assessment: UA positive for UTI  Started patient on Keflex, which patient says she has taken without issue in the past  Remaining workup reassuring  Strict ED return precautions provided should symptoms worsen and patient can otherwise follow up outpatient   Patient expresses an understanding and agreement with the plan and remains in good condition for discharge  Disposition  Final diagnoses:   Paresthesias in right hand   Chest pain   UTI (urinary tract infection)     Time reflects when diagnosis was documented in both MDM as applicable and the Disposition within this note     Time User Action Codes Description Comment    4/23/2021 12:21 AM Minor, Queenie Add [R20 2] Paresthesias in right hand     4/23/2021 12:21 AM Minor, Queenie Add [R07 9] Chest pain     4/23/2021 12:21 AM Minor, Queenie Add [N39 0] UTI (urinary tract infection)     4/23/2021 12:21 AM Minor, Queenie Modify [R20 2] Paresthesias in right hand     4/23/2021 12:21 AM Minor, Queenie Modify [N39 0] UTI (urinary tract infection)       ED Disposition     ED Disposition Condition Date/Time Comment    Discharge Good Fri Apr 23, 2021 12:20 AM Katrina Paget discharge to home/self care  Follow-up Information     Follow up With Specialties Details Why Contact Info Additional [address] [facility], DO Family Medicine Call in 1 day  76 [facility] Emergency Department Emergency Medicine Go to  If symptoms worsen [address]  958 [facility] 64 [facility] Emergency Department, [address] [place] [address] 108          Patient's Medications   Discharge Prescriptions    CEPHALEXIN (KEFLEX) 250 MG CAPSULE    Take 2 capsules (500 mg total) by mouth every 6 (six) hours for 10 days       Start Date: 4/23/2021 End Date: 5/3/2021       Order Dose: 500 mg       Quantity: 80 capsule    Refills: 0     No discharge procedures on file  PDMP Review     None           ED Provider  Attending physically available and evaluated Rissasanti Vallejoying TELLO managed the patient along with the ED Attending      Electronically Signed by         Eda Beverly MD  04/23/21 2215

## 2021-04-24 NOTE — ED ATTENDING ATTESTATION
4/22/2021  ILesly DO, saw and evaluated the patient  I have discussed the patient with the resident/non-physician practitioner and agree with the resident's/non-physician practitioner's findings, Plan of Care, and MDM as documented in the resident's/non-physician practitioner's note, except where noted  All available labs and Radiology studies were reviewed  I was present for key portions of any procedure(s) performed by the resident/non-physician practitioner and I was immediately available to provide assistance  At this point I agree with the current assessment done in the Emergency Department  I have conducted an independent evaluation of this patient a history and physical is as follows:    45 yof with multiple c/o  Right wrist drop that resolved, felt to be from overuse  Atypical cp  Also anxiety   Normal exam  Basic labs, trop, ekg, check lytes, likely dc    ED Course         Critical Care Time  Procedures

## 2021-04-25 LAB — BACTERIA UR CULT: ABNORMAL

## 2021-05-04 ENCOUNTER — APPOINTMENT (EMERGENCY)
Dept: CT IMAGING | Facility: HOSPITAL | Age: 41
End: 2021-05-04
Payer: COMMERCIAL

## 2021-05-04 ENCOUNTER — HOSPITAL ENCOUNTER (EMERGENCY)
Facility: HOSPITAL | Age: 41
Discharge: HOME | End: 2021-05-04
Attending: EMERGENCY MEDICINE | Admitting: EMERGENCY MEDICINE
Payer: COMMERCIAL

## 2021-05-04 ENCOUNTER — APPOINTMENT (EMERGENCY)
Dept: RADIOLOGY | Facility: HOSPITAL | Age: 41
End: 2021-05-04
Payer: COMMERCIAL

## 2021-05-04 VITALS
HEART RATE: 70 BPM | SYSTOLIC BLOOD PRESSURE: 137 MMHG | WEIGHT: 137.79 LBS | RESPIRATION RATE: 16 BRPM | OXYGEN SATURATION: 100 % | TEMPERATURE: 97.5 F | DIASTOLIC BLOOD PRESSURE: 82 MMHG | BODY MASS INDEX: 21.58 KG/M2

## 2021-05-04 DIAGNOSIS — M54.12 CERVICAL RADICULOPATHY: Primary | ICD-10-CM

## 2021-05-04 DIAGNOSIS — R51.9 HEADACHE: ICD-10-CM

## 2021-05-04 LAB
ANION GAP SERPL CALCULATED.3IONS-SCNC: 7 MMOL/L (ref 4–13)
ATRIAL RATE: 55 BPM
BACTERIA UR QL AUTO: NORMAL /HPF
BASOPHILS # BLD AUTO: 0.06 THOUSANDS/ΜL (ref 0–0.1)
BASOPHILS NFR BLD AUTO: 2 % (ref 0–1)
BILIRUB UR QL STRIP: NEGATIVE
BUN SERPL-MCNC: 13 MG/DL (ref 5–25)
CALCIUM SERPL-MCNC: 9.5 MG/DL (ref 8.3–10.1)
CHLORIDE SERPL-SCNC: 104 MMOL/L (ref 100–108)
CLARITY UR: CLEAR
CO2 SERPL-SCNC: 28 MMOL/L (ref 21–32)
COLOR UR: YELLOW
CREAT SERPL-MCNC: 0.84 MG/DL (ref 0.6–1.3)
EOSINOPHIL # BLD AUTO: 0.25 THOUSAND/ΜL (ref 0–0.61)
EOSINOPHIL NFR BLD AUTO: 6 % (ref 0–6)
ERYTHROCYTE [DISTWIDTH] IN BLOOD BY AUTOMATED COUNT: 11.7 % (ref 11.6–15.1)
EXT PREG TEST URINE: NEGATIVE
EXT. CONTROL ED NAV: NORMAL
GFR SERPL CREATININE-BSD FRML MDRD: 88 ML/MIN/1.73SQ M
GLUCOSE SERPL-MCNC: 86 MG/DL (ref 65–140)
GLUCOSE UR STRIP-MCNC: NEGATIVE MG/DL
HCT VFR BLD AUTO: 41.8 % (ref 34.8–46.1)
HGB BLD-MCNC: 14.1 G/DL (ref 11.5–15.4)
HGB UR QL STRIP.AUTO: ABNORMAL
IMM GRANULOCYTES # BLD AUTO: 0.01 THOUSAND/UL (ref 0–0.2)
IMM GRANULOCYTES NFR BLD AUTO: 0 % (ref 0–2)
KETONES UR STRIP-MCNC: NEGATIVE MG/DL
LEUKOCYTE ESTERASE UR QL STRIP: ABNORMAL
LYMPHOCYTES # BLD AUTO: 1.43 THOUSANDS/ΜL (ref 0.6–4.47)
LYMPHOCYTES NFR BLD AUTO: 35 % (ref 14–44)
MCH RBC QN AUTO: 32.7 PG (ref 26.8–34.3)
MCHC RBC AUTO-ENTMCNC: 33.7 G/DL (ref 31.4–37.4)
MCV RBC AUTO: 97 FL (ref 82–98)
MONOCYTES # BLD AUTO: 0.59 THOUSAND/ΜL (ref 0.17–1.22)
MONOCYTES NFR BLD AUTO: 15 % (ref 4–12)
NEUTROPHILS # BLD AUTO: 1.73 THOUSANDS/ΜL (ref 1.85–7.62)
NEUTS SEG NFR BLD AUTO: 42 % (ref 43–75)
NITRITE UR QL STRIP: NEGATIVE
NON-SQ EPI CELLS URNS QL MICRO: NORMAL /HPF
NRBC BLD AUTO-RTO: 0 /100 WBCS
P AXIS: 80 DEGREES
PH UR STRIP.AUTO: 6 [PH] (ref 4.5–8)
PLATELET # BLD AUTO: 216 THOUSANDS/UL (ref 149–390)
PMV BLD AUTO: 10.5 FL (ref 8.9–12.7)
POTASSIUM SERPL-SCNC: 4.1 MMOL/L (ref 3.5–5.3)
PR INTERVAL: 172 MS
PROT UR STRIP-MCNC: NEGATIVE MG/DL
QRS AXIS: 79 DEGREES
QRSD INTERVAL: 60 MS
QT INTERVAL: 412 MS
QTC INTERVAL: 394 MS
RBC # BLD AUTO: 4.31 MILLION/UL (ref 3.81–5.12)
RBC #/AREA URNS AUTO: NORMAL /HPF
SODIUM SERPL-SCNC: 139 MMOL/L (ref 136–145)
SP GR UR STRIP.AUTO: 1.01 (ref 1–1.03)
T WAVE AXIS: 84 DEGREES
TROPONIN I SERPL-MCNC: <0.02 NG/ML
TSH SERPL DL<=0.05 MIU/L-ACNC: 1.31 UIU/ML (ref 0.36–3.74)
UROBILINOGEN UR QL STRIP.AUTO: 0.2 E.U./DL
VENTRICULAR RATE: 55 BPM
WBC # BLD AUTO: 4.07 THOUSAND/UL (ref 4.31–10.16)
WBC #/AREA URNS AUTO: NORMAL /HPF

## 2021-05-04 PROCEDURE — 80048 BASIC METABOLIC PNL TOTAL CA: CPT | Performed by: PHYSICIAN ASSISTANT

## 2021-05-04 PROCEDURE — 85025 COMPLETE CBC W/AUTO DIFF WBC: CPT | Performed by: PHYSICIAN ASSISTANT

## 2021-05-04 PROCEDURE — 71045 X-RAY EXAM CHEST 1 VIEW: CPT

## 2021-05-04 PROCEDURE — 81025 URINE PREGNANCY TEST: CPT | Performed by: PHYSICIAN ASSISTANT

## 2021-05-04 PROCEDURE — 96374 THER/PROPH/DIAG INJ IV PUSH: CPT

## 2021-05-04 PROCEDURE — 36415 COLL VENOUS BLD VENIPUNCTURE: CPT | Performed by: PHYSICIAN ASSISTANT

## 2021-05-04 PROCEDURE — 93010 ELECTROCARDIOGRAM REPORT: CPT | Performed by: INTERNAL MEDICINE

## 2021-05-04 PROCEDURE — 99285 EMERGENCY DEPT VISIT HI MDM: CPT | Performed by: PHYSICIAN ASSISTANT

## 2021-05-04 PROCEDURE — 81001 URINALYSIS AUTO W/SCOPE: CPT

## 2021-05-04 PROCEDURE — 93005 ELECTROCARDIOGRAM TRACING: CPT

## 2021-05-04 PROCEDURE — 99285 EMERGENCY DEPT VISIT HI MDM: CPT

## 2021-05-04 PROCEDURE — 84484 ASSAY OF TROPONIN QUANT: CPT | Performed by: PHYSICIAN ASSISTANT

## 2021-05-04 PROCEDURE — 72125 CT NECK SPINE W/O DYE: CPT

## 2021-05-04 PROCEDURE — 70450 CT HEAD/BRAIN W/O DYE: CPT

## 2021-05-04 PROCEDURE — G1004 CDSM NDSC: HCPCS

## 2021-05-04 PROCEDURE — 84443 ASSAY THYROID STIM HORMONE: CPT | Performed by: PHYSICIAN ASSISTANT

## 2021-05-04 RX ORDER — LIDOCAINE 50 MG/G
1 PATCH TOPICAL ONCE
Status: DISCONTINUED | OUTPATIENT
Start: 2021-05-04 | End: 2021-05-04 | Stop reason: HOSPADM

## 2021-05-04 RX ORDER — KETOROLAC TROMETHAMINE 30 MG/ML
15 INJECTION, SOLUTION INTRAMUSCULAR; INTRAVENOUS ONCE
Status: COMPLETED | OUTPATIENT
Start: 2021-05-04 | End: 2021-05-04

## 2021-05-04 RX ORDER — DIAZEPAM 5 MG/1
5 TABLET ORAL ONCE
Status: COMPLETED | OUTPATIENT
Start: 2021-05-04 | End: 2021-05-04

## 2021-05-04 RX ORDER — KETOROLAC TROMETHAMINE 10 MG/1
10 TABLET, FILM COATED ORAL EVERY 6 HOURS PRN
Qty: 20 TABLET | Refills: 0 | Status: SHIPPED | OUTPATIENT
Start: 2021-05-04 | End: 2021-08-23

## 2021-05-04 RX ADMIN — KETOROLAC TROMETHAMINE 15 MG: 30 INJECTION, SOLUTION INTRAMUSCULAR; INTRAVENOUS at 11:54

## 2021-05-04 RX ADMIN — LIDOCAINE 1 PATCH: 50 PATCH CUTANEOUS at 10:12

## 2021-05-04 RX ADMIN — DIAZEPAM 5 MG: 5 TABLET ORAL at 10:11

## 2021-05-04 NOTE — Clinical Note
Maine Obando was seen and treated in our emergency department on 5/4/2021  Diagnosis:     [place]    She may return on this date: 05/06/2021         If you have any questions or concerns, please don't hesitate to call        Lesly Ng PA-C    ______________________________           _______________          _______________  Hospital Representative                              Date                                Time

## 2021-05-04 NOTE — DISCHARGE INSTRUCTIONS
Please refer to the attached information for strict return instructions  If symptoms worsen or new symptoms develop please return to the ER  Please follow up with orthopedics tomorrow as scheduled  Use prescribed medications as needed for pain

## 2021-05-05 ENCOUNTER — TELEPHONE (OUTPATIENT)
Dept: OBGYN CLINIC | Facility: MEDICAL CENTER | Age: 41
End: 2021-05-05

## 2021-05-05 NOTE — ED PROVIDER NOTES
History  Chief Complaint   Patient presents with    Extremity Weakness     Pt reports right lower back pain radiating up entire right side for the last week  Pt reports was evaluated for the same at [facility] last week  Pt worsening right sided pain  Pt reports approximately one hours after waking up she noticed dropping/blurred vision to right eye and right arm weakness  Pt reports  she has a mild headache and "feels off "     Amy is a 46 yo F presenting with R sided neck and shoulder pain for the past approximately 1 week, as well as associated numbness, tingling, and now weakness to R upper extremity over the past day  She additionally notes R sided headache for the past 1-2 days which she states is new, denies prior history of similar headaches  She also notes waking up this morning with potential drooping to R eyelid, however notes mild swelling to R upper eyelid which may contribute to perception of eyelid droop  She reports she was evaluated in ED on 4/22 for RUE paresthesias as well as UTI and chest pain which she states have resolved  Patient reports she has a follow up appointment scheduled with orthopedics tomorrow for shoulder/neck pain  Denies recent injury or trauma  Was prescribed flexeril which she reports taking yesterday with minimal relief  History provided by:  Patient   used: No    Shoulder Pain  Location:  Shoulder  Shoulder location:  R shoulder  Injury: no    Pain details:     Radiates to: R neck, right upper extremity  Dislocation: no    Prior injury to area:  No  Relieved by:  Muscle relaxant  Worsened by: Movement and stretching area  Ineffective treatments:  NSAIDs and heat  Associated symptoms: neck pain, numbness and tingling    Associated symptoms: no back pain, no fever and no swelling        Prior to Admission Medications   Prescriptions Last Dose Informant Patient Reported? Taking?    Calcium Carb-Cholecalciferol (CALCIUM 1000 + D) 1000-800 MG-UNIT TABS Yes No   Sig: calcium   Iron-Vitamin C (IRON 100/C) 100-250 MG TABS   Yes No   Sig: Virt-PN DHA 27 mg iron-1 mg-300 mg capsule   take 1 capsule by mouth once daily   Magnesium Citrate 100 MG TABS   Yes No   Sig: Take 296 mL by mouth   Multiple Vitamins-Minerals (MULTIVITAMIN ADULT PO)   Yes No   Sig: Take by mouth daily   Omega-3 Fatty Acids (FISH OIL) 1,000 mg   Yes No   Sig: Fish Oil   amitriptyline (ELAVIL) 25 mg tablet   Yes No   Sig: Take 25 mg by mouth   atoMOXetine (STRATTERA) 40 mg capsule   Yes No   Sig: Take 40 mg by mouth daily   atoMOXetine (STRATTERA) 60 mg capsule   Yes No   Sig: Take 60 mg by mouth daily   cephalexin (KEFLEX) 250 mg capsule   No No   Sig: Take 2 capsules (500 mg total) by mouth every 6 (six) hours for 10 days   cyclobenzaprine (FLEXERIL) 5 mg tablet   No No   Sig: Take 1 tablet (5 mg total) by mouth daily at bedtime as needed for muscle spasms   gabapentin (NEURONTIN) 300 mg capsule   Yes No   Sig: Take 300 mg by mouth Three times a day   methylphenidate (CONCERTA) 18 mg ER tablet   Yes No   Sig: Take 18 mg by mouth daily      Facility-Administered Medications: None       Past Medical History:   Diagnosis Date    UTI (urinary tract infection)     UTI group B Strep x 2 5 months ago       Past Surgical History:   Procedure Laterality Date    JOINT REPLACEMENT      right shoulder dislocation repair       History reviewed  No pertinent family history  I have reviewed and agree with the history as documented  E-Cigarette/Vaping     E-Cigarette/Vaping Substances     Social History     Tobacco Use    Smoking status: Current Every Day Smoker     Types: E-Cigarettes    Smokeless tobacco: Current User   Substance Use Topics    Alcohol use: No    Drug use: No       Review of Systems   Constitutional: Negative for chills and fever  HENT: Negative for congestion, ear pain, rhinorrhea and sore throat  Eyes: Negative for pain and visual disturbance     Respiratory: Negative for cough, shortness of breath and wheezing  Cardiovascular: Negative for chest pain and palpitations  Gastrointestinal: Negative for abdominal pain, constipation, diarrhea, nausea and vomiting  Genitourinary: Negative for dysuria, frequency and urgency  Musculoskeletal: Positive for neck pain  Negative for back pain and neck stiffness  Skin: Negative for rash and wound  Neurological: Positive for weakness, numbness and headaches  Negative for dizziness, syncope, speech difficulty and light-headedness  Physical Exam  Physical Exam  Constitutional:       General: She is not in acute distress  Appearance: She is well-developed  She is not diaphoretic  HENT:      Head: Normocephalic and atraumatic  Right Ear: External ear normal       Left Ear: External ear normal    Eyes:      Conjunctiva/sclera: Conjunctivae normal       Pupils: Pupils are equal, round, and reactive to light  Neck:      Musculoskeletal: Normal range of motion and neck supple  Cardiovascular:      Rate and Rhythm: Normal rate and regular rhythm  Heart sounds: Normal heart sounds  No murmur  No friction rub  No gallop  Pulmonary:      Effort: Pulmonary effort is normal  No respiratory distress  Breath sounds: Normal breath sounds  No wheezing  Abdominal:      General: There is no distension  Palpations: Abdomen is soft  Tenderness: There is no abdominal tenderness  Musculoskeletal:         General: Tenderness present  Comments: TTP to R cervical paraspinal musculature, over R trapezius muscle  Slightly decreased ROM to R shoulder as compared with left side in abduction/flexion due to pain  Lymphadenopathy:      Cervical: No cervical adenopathy  Skin:     General: Skin is warm and dry  Capillary Refill: Capillary refill takes less than 2 seconds  Findings: No erythema or rash  Neurological:      Mental Status: She is alert and oriented to person, place, and time        Motor: No abnormal muscle tone  Coordination: Coordination normal       Comments: CN II-XII grossly intact  No nystagmus, strabismus, or lid lag  No facial asymmetry  4/5 strength to RUE, 5/5 to LUE  5/5 symmetric strength to b/l LE  Intact, symmetric sensation to b/l face, upper extremities, and lower extremities  Intact coordination in finger-nose and heel-shin testing b/l  Normal gait  Psychiatric:         Behavior: Behavior normal          Thought Content:  Thought content normal          Judgment: Judgment normal          Vital Signs  ED Triage Vitals   Temperature Pulse Respirations Blood Pressure SpO2   05/04/21 0932 05/04/21 0932 05/04/21 0932 05/04/21 0932 05/04/21 0932   97 5 °F (36 4 °C) 76 16 137/82 100 %      Temp Source Heart Rate Source Patient Position - Orthostatic VS BP Location FiO2 (%)   05/04/21 0932 05/04/21 0932 -- -- --   Oral Monitor         Pain Score       05/04/21 1154       5           Vitals:    05/04/21 0932 05/04/21 1145   BP: 137/82    Pulse: 76 70         Visual Acuity      ED Medications  Medications   diazepam (VALIUM) tablet 5 mg (5 mg Oral Given 5/4/21 1011)   ketorolac (TORADOL) injection 15 mg (15 mg Intravenous Given 5/4/21 1154)       Diagnostic Studies  Results Reviewed     Procedure Component Value Units Date/Time    POCT pregnancy, urine [[id number]]  (Normal) Resulted: 05/04/21 1103    Lab Status: Final result Updated: 05/04/21 1103     EXT PREG TEST UR (Ref: Negative) negative     Control valid    Basic metabolic panel [[id number]] Collected: 05/04/21 1023    Lab Status: Final result Specimen: Blood from Arm, Right Updated: 05/04/21 1054     Sodium 139 mmol/L      Potassium 4 1 mmol/L      Chloride 104 mmol/L      CO2 28 mmol/L      ANION GAP 7 mmol/L      BUN 13 mg/dL      Creatinine 0 84 mg/dL      Glucose 86 mg/dL      Calcium 9 5 mg/dL      eGFR 88 ml/min/1 73sq m     Narrative:      [place] guidelines for Chronic Kidney Disease (CKD):    Stage 1 with normal or high GFR (GFR > 90 mL/min/1 73 square meters)    Stage 2 Mild CKD (GFR = 60-89 mL/min/1 73 square meters)    Stage 3A Moderate CKD (GFR = 45-59 mL/min/1 73 square meters)    Stage 3B Moderate CKD (GFR = 30-44 mL/min/1 73 square meters)    Stage 4 Severe CKD (GFR = 15-29 mL/min/1 73 square meters)    Stage 5 End Stage CKD (GFR <15 mL/min/1 73 square meters)  Note: GFR calculation is accurate only with a steady state creatinine    TSH, 3rd generation with Free T4 reflex [[id number]]  (Normal) Collected: 05/04/21 1023    Lab Status: Final result Specimen: Blood from Arm, Right Updated: 05/04/21 1054     TSH 3RD GENERATON 1 308 uIU/mL     Narrative:      Patients undergoing fluorescein dye angiography may retain small amounts of fluorescein in the body for 48-72 hours post procedure  Samples containing fluorescein can produce falsely depressed TSH values  If the patient had this procedure,a specimen should be resubmitted post fluorescein clearance        Urine Microscopic [[id number]]  (Normal) Collected: 05/04/21 1037    Lab Status: Final result Specimen: Urine, Clean Catch Updated: 05/04/21 1054     RBC, UA 2-4 /hpf      WBC, UA 2-4 /hpf      Epithelial Cells Occasional /hpf      Bacteria, UA Occasional /hpf     Troponin I [[id number]]  (Normal) Collected: 05/04/21 1023    Lab Status: Final result Specimen: Blood from Arm, Right Updated: 05/04/21 1049     Troponin I <0 02 ng/mL     Urine Macroscopic, POC [[id number]]  (Abnormal) Collected: 05/04/21 1037    Lab Status: Final result Specimen: Urine Updated: 05/04/21 1038     Color, UA Yellow     Clarity, UA Clear     pH, UA 6 0     Leukocytes, UA Small     Nitrite, UA Negative     Protein, UA Negative mg/dl      Glucose, UA Negative mg/dl      Ketones, UA Negative mg/dl      Urobilinogen, UA 0 2 E U /dl      Bilirubin, UA Negative     Blood, UA Trace     Specific [place], UA 1 015    Narrative:      CLINITEK RESULT    CBC and differential [[id number]]  (Abnormal) Collected: 05/04/21 1023    Lab Status: Final result Specimen: Blood from Arm, Right Updated: 05/04/21 1030     WBC 4 07 Thousand/uL      RBC 4 31 Million/uL      Hemoglobin 14 1 g/dL      Hematocrit 41 8 %      MCV 97 fL      MCH 32 7 pg      MCHC 33 7 g/dL      RDW 11 7 %      MPV 10 5 fL      Platelets 245 Thousands/uL      nRBC 0 /100 WBCs      Neutrophils Relative 42 %      Immat GRANS % 0 %      Lymphocytes Relative 35 %      Monocytes Relative 15 %      Eosinophils Relative 6 %      Basophils Relative 2 %      Neutrophils Absolute 1 73 Thousands/µL      Immature Grans Absolute 0 01 Thousand/uL      Lymphocytes Absolute 1 43 Thousands/µL      Monocytes Absolute 0 59 Thousand/µL      Eosinophils Absolute 0 25 Thousand/µL      Basophils Absolute 0 06 Thousands/µL                  CT head without contrast   Final Result by Daryl Watkins MD (05/04 1132)      No acute intracranial abnormality  Workstation performed: [id number]         CT spine cervical without contrast   Final Result by Daryl Watkins MD (05/04 1135)      No cervical spine fracture or traumatic malalignment  Workstation performed: [id number]         XR chest 1 view portable   Final Result by Elgin Teresa MD (05/04 1406)      No acute cardiopulmonary disease                  Workstation performed: [id number]                    Procedures  ECG 12 Lead Documentation Only    Date/Time: 5/4/2021 10:42 AM  Performed by: Wilman Gallo PA-C  Authorized by: Wilman Gallo PA-C     ECG reviewed by me, the ED Provider: yes    Patient location:  ED  Previous ECG:     Previous ECG:  Compared to current    Similarity:  No change    Comparison to cardiac monitor: Yes    Interpretation:     Interpretation: normal    Rate:     ECG rate:  55    ECG rate assessment: bradycardic    Rhythm:     Rhythm: sinus rhythm    Ectopy:     Ectopy: none    QRS:     QRS axis:  Normal    QRS intervals: Normal  Conduction:     Conduction: normal    ST segments:     ST segments:  Normal  T waves:     T waves: normal               ED Course  ED Course as of May 05 1056   Tue May 04, 2021   1219 Patient reports significant improvement in pain with ED therapy  CT head, c-spine unremarkable  SBIRT 20yo+      Most Recent Value   SBIRT (22 yo +)   In order to provide better care to our patients, we are screening all of our patients for alcohol and drug use  Would it be okay to ask you these screening questions? Unable to answer at this time Filed at: 05/04/2021 8055                    [facility]  Number of Diagnoses or Management Options  Cervical radiculopathy:   Headache:   Diagnosis management comments: R sided neck, shoulder pain radiating down arm now with associated tingling, numbness, and some R sided weakness as compared with L upper extremity, possibly due to pain  Notes potential R eyelid droop, but on exam appears to be due to tiny amount of edema to R upper eyelid  However, no associated cranial nerve deficits or abnormal EOM's  Symptoms to R shoulder, neck, upper extremity suspected to be due to cervical radiculopathy  Patient's history and exam do not appear to be consistent with CVA  However, given acute onset of R sided headache atypical for her in setting of neurologic symptoms will check CT head, will add CT c-spine  Check basic bloodwork, cardiac labs/EKG, provide toradol/valium for pain/spasm pending unremarkable CT head           Amount and/or Complexity of Data Reviewed  Clinical lab tests: ordered  Tests in the radiology section of CPT®: ordered    Patient Progress  Patient progress: improved      Disposition  Final diagnoses:   Cervical radiculopathy   Headache     Time reflects when diagnosis was documented in both MDM as applicable and the Disposition within this note     Time User Action Codes Description Comment    5/4/2021 12:38 PM Funmi Hartman Add [M59 12] Cervical radiculopathy     5/4/2021 12:38 PM Tristian Sutton Add [R51 9] Headache       ED Disposition     ED Disposition Condition Date/Time Comment    Discharge Stable Tue May 4, 2021 12:37 PM Sudheer Kingt discharge to home/self care  Follow-up Information     Follow up With Specialties Details Why Contact Info Additional [address] [facility] Emergency Department Emergency Medicine  If symptoms worsen [place] [id number] [address] [facility] Emergency Department, [address] [facility] , [place], [place], [address] - orthopedics   Follow up tomorrow as scheduled              Discharge Medication List as of 5/4/2021 12:41 PM      START taking these medications    Details   ketorolac (TORADOL) 10 mg tablet Take 1 tablet (10 mg total) by mouth every 6 (six) hours as needed for moderate pain or severe pain, Starting Tue 5/4/2021, Normal         CONTINUE these medications which have NOT CHANGED    Details   amitriptyline (ELAVIL) 25 mg tablet Take 25 mg by mouth, Starting Thu 1/2/2020, Until Fri 1/1/2021, Historical Med      !! atoMOXetine (STRATTERA) 40 mg capsule Take 40 mg by mouth daily, Starting Mon 10/28/2019, Historical Med      !! atoMOXetine (STRATTERA) 60 mg capsule Take 60 mg by mouth daily, Starting Tue 12/3/2019, Historical Med      Calcium Carb-Cholecalciferol (CALCIUM 1000 + D) 1000-800 MG-UNIT TABS calcium, Historical Med      cyclobenzaprine (FLEXERIL) 5 mg tablet Take 1 tablet (5 mg total) by mouth daily at bedtime as needed for muscle spasms, Starting Mon 12/16/2019, Normal      gabapentin (NEURONTIN) 300 mg capsule Take 300 mg by mouth Three times a day, Starting Mon 4/22/2019, Until Tue 4/21/2020, Historical Med      Iron-Vitamin C (IRON 100/C) 100-250 MG TABS Virt-PN DHA 27 mg iron-1 mg-300 mg capsule   take 1 capsule by mouth once daily, Historical Med      Magnesium Citrate 100 MG TABS Take 296 mL by mouth, Historical Med      methylphenidate (CONCERTA) 18 mg ER tablet Take 18 mg by mouth daily, Historical Med      Multiple Vitamins-Minerals (MULTIVITAMIN ADULT PO) Take by mouth daily, Historical Med      Omega-3 Fatty Acids (FISH OIL) 1,000 mg Fish Oil, Historical Med       !! - Potential duplicate medications found  Please discuss with provider  STOP taking these medications       cephalexin (KEFLEX) 250 mg capsule Comments:   Reason for Stopping:             No discharge procedures on file      PDMP Review     None          ED Provider  Electronically Signed by           Max Collier PA-C  05/05/21 8294

## 2021-05-05 NOTE — TELEPHONE ENCOUNTER
Patient was see in the ER and was given referral to see Becky Rivera  I did call patient to help with scheduled appt but no answer  I did leave a VM to give us a call back to schedule appt

## 2021-05-16 ENCOUNTER — APPOINTMENT (EMERGENCY)
Dept: ULTRASOUND IMAGING | Facility: HOSPITAL | Age: 41
End: 2021-05-16
Payer: COMMERCIAL

## 2021-05-16 ENCOUNTER — HOSPITAL ENCOUNTER (EMERGENCY)
Facility: HOSPITAL | Age: 41
Discharge: HOME | End: 2021-05-17
Attending: EMERGENCY MEDICINE
Payer: COMMERCIAL

## 2021-05-16 DIAGNOSIS — R10.11 RIGHT UPPER QUADRANT ABDOMINAL PAIN: Primary | ICD-10-CM

## 2021-05-16 LAB
ALBUMIN SERPL BCP-MCNC: 4.1 G/DL (ref 3.5–5)
ALP SERPL-CCNC: 51 U/L (ref 46–116)
ALT SERPL W P-5'-P-CCNC: 23 U/L (ref 12–78)
ANION GAP SERPL CALCULATED.3IONS-SCNC: 8 MMOL/L (ref 4–13)
AST SERPL W P-5'-P-CCNC: 14 U/L (ref 5–45)
BASOPHILS # BLD AUTO: 0.04 THOUSANDS/ΜL (ref 0–0.1)
BASOPHILS NFR BLD AUTO: 1 % (ref 0–1)
BILIRUB SERPL-MCNC: 0.33 MG/DL (ref 0.2–1)
BUN SERPL-MCNC: 14 MG/DL (ref 5–25)
CALCIUM SERPL-MCNC: 9.1 MG/DL (ref 8.3–10.1)
CHLORIDE SERPL-SCNC: 103 MMOL/L (ref 100–108)
CK SERPL-CCNC: 49 U/L (ref 26–192)
CO2 SERPL-SCNC: 30 MMOL/L (ref 21–32)
CREAT SERPL-MCNC: 0.85 MG/DL (ref 0.6–1.3)
EOSINOPHIL # BLD AUTO: 0.1 THOUSAND/ΜL (ref 0–0.61)
EOSINOPHIL NFR BLD AUTO: 2 % (ref 0–6)
ERYTHROCYTE [DISTWIDTH] IN BLOOD BY AUTOMATED COUNT: 11.7 % (ref 11.6–15.1)
GFR SERPL CREATININE-BSD FRML MDRD: 87 ML/MIN/1.73SQ M
GLUCOSE SERPL-MCNC: 90 MG/DL (ref 65–140)
HCG SERPL QL: NEGATIVE
HCT VFR BLD AUTO: 40 % (ref 34.8–46.1)
HGB BLD-MCNC: 13 G/DL (ref 11.5–15.4)
IMM GRANULOCYTES # BLD AUTO: 0.01 THOUSAND/UL (ref 0–0.2)
IMM GRANULOCYTES NFR BLD AUTO: 0 % (ref 0–2)
LIPASE SERPL-CCNC: 116 U/L (ref 73–393)
LYMPHOCYTES # BLD AUTO: 1.7 THOUSANDS/ΜL (ref 0.6–4.47)
LYMPHOCYTES NFR BLD AUTO: 31 % (ref 14–44)
MCH RBC QN AUTO: 31.1 PG (ref 26.8–34.3)
MCHC RBC AUTO-ENTMCNC: 32.5 G/DL (ref 31.4–37.4)
MCV RBC AUTO: 96 FL (ref 82–98)
MONOCYTES # BLD AUTO: 0.53 THOUSAND/ΜL (ref 0.17–1.22)
MONOCYTES NFR BLD AUTO: 10 % (ref 4–12)
NEUTROPHILS # BLD AUTO: 3.04 THOUSANDS/ΜL (ref 1.85–7.62)
NEUTS SEG NFR BLD AUTO: 56 % (ref 43–75)
NRBC BLD AUTO-RTO: 0 /100 WBCS
PLATELET # BLD AUTO: 260 THOUSANDS/UL (ref 149–390)
PMV BLD AUTO: 10.1 FL (ref 8.9–12.7)
POTASSIUM SERPL-SCNC: 3.8 MMOL/L (ref 3.5–5.3)
PROT SERPL-MCNC: 7.6 G/DL (ref 6.4–8.2)
RBC # BLD AUTO: 4.18 MILLION/UL (ref 3.81–5.12)
SODIUM SERPL-SCNC: 141 MMOL/L (ref 136–145)
WBC # BLD AUTO: 5.42 THOUSAND/UL (ref 4.31–10.16)

## 2021-05-16 PROCEDURE — 99284 EMERGENCY DEPT VISIT MOD MDM: CPT

## 2021-05-16 PROCEDURE — 80053 COMPREHEN METABOLIC PANEL: CPT | Performed by: EMERGENCY MEDICINE

## 2021-05-16 PROCEDURE — 96374 THER/PROPH/DIAG INJ IV PUSH: CPT

## 2021-05-16 PROCEDURE — 96375 TX/PRO/DX INJ NEW DRUG ADDON: CPT

## 2021-05-16 PROCEDURE — 84703 CHORIONIC GONADOTROPIN ASSAY: CPT | Performed by: EMERGENCY MEDICINE

## 2021-05-16 PROCEDURE — 99285 EMERGENCY DEPT VISIT HI MDM: CPT | Performed by: EMERGENCY MEDICINE

## 2021-05-16 PROCEDURE — 76705 ECHO EXAM OF ABDOMEN: CPT

## 2021-05-16 PROCEDURE — 85025 COMPLETE CBC W/AUTO DIFF WBC: CPT | Performed by: EMERGENCY MEDICINE

## 2021-05-16 PROCEDURE — 96361 HYDRATE IV INFUSION ADD-ON: CPT

## 2021-05-16 PROCEDURE — 85379 FIBRIN DEGRADATION QUANT: CPT | Performed by: EMERGENCY MEDICINE

## 2021-05-16 PROCEDURE — 83690 ASSAY OF LIPASE: CPT | Performed by: EMERGENCY MEDICINE

## 2021-05-16 PROCEDURE — 36415 COLL VENOUS BLD VENIPUNCTURE: CPT | Performed by: EMERGENCY MEDICINE

## 2021-05-16 PROCEDURE — 85730 THROMBOPLASTIN TIME PARTIAL: CPT | Performed by: EMERGENCY MEDICINE

## 2021-05-16 PROCEDURE — 85610 PROTHROMBIN TIME: CPT | Performed by: EMERGENCY MEDICINE

## 2021-05-16 PROCEDURE — 82550 ASSAY OF CK (CPK): CPT | Performed by: EMERGENCY MEDICINE

## 2021-05-16 RX ORDER — ONDANSETRON 2 MG/ML
4 INJECTION INTRAMUSCULAR; INTRAVENOUS ONCE
Status: COMPLETED | OUTPATIENT
Start: 2021-05-16 | End: 2021-05-16

## 2021-05-16 RX ORDER — HYDROMORPHONE HCL/PF 1 MG/ML
0.2 SYRINGE (ML) INJECTION ONCE
Status: COMPLETED | OUTPATIENT
Start: 2021-05-16 | End: 2021-05-16

## 2021-05-16 RX ADMIN — SODIUM CHLORIDE 1000 ML: 0.9 INJECTION, SOLUTION INTRAVENOUS at 23:16

## 2021-05-16 RX ADMIN — ONDANSETRON 4 MG: 2 INJECTION INTRAMUSCULAR; INTRAVENOUS at 23:18

## 2021-05-16 RX ADMIN — HYDROMORPHONE HYDROCHLORIDE 0.2 MG: 1 INJECTION, SOLUTION INTRAMUSCULAR; INTRAVENOUS; SUBCUTANEOUS at 23:22

## 2021-05-17 ENCOUNTER — APPOINTMENT (EMERGENCY)
Dept: CT IMAGING | Facility: HOSPITAL | Age: 41
End: 2021-05-17
Payer: COMMERCIAL

## 2021-05-17 VITALS
TEMPERATURE: 98.7 F | SYSTOLIC BLOOD PRESSURE: 110 MMHG | RESPIRATION RATE: 16 BRPM | BODY MASS INDEX: 18.83 KG/M2 | WEIGHT: 120 LBS | HEART RATE: 76 BPM | OXYGEN SATURATION: 98 % | HEIGHT: 67 IN | DIASTOLIC BLOOD PRESSURE: 68 MMHG

## 2021-05-17 LAB
APTT PPP: 29 SECONDS (ref 23–37)
BACTERIA UR QL AUTO: ABNORMAL /HPF
BILIRUB UR QL STRIP: NEGATIVE
CLARITY UR: CLEAR
COLOR UR: YELLOW
D DIMER PPP FEU-MCNC: 0.34 UG/ML FEU
GLUCOSE UR STRIP-MCNC: NEGATIVE MG/DL
HGB UR QL STRIP.AUTO: ABNORMAL
INR PPP: 1.02 (ref 0.84–1.19)
KETONES UR STRIP-MCNC: NEGATIVE MG/DL
LEUKOCYTE ESTERASE UR QL STRIP: ABNORMAL
NITRITE UR QL STRIP: NEGATIVE
NON-SQ EPI CELLS URNS QL MICRO: ABNORMAL /HPF
PH UR STRIP.AUTO: 6 [PH]
PROT UR STRIP-MCNC: NEGATIVE MG/DL
PROTHROMBIN TIME: 13.5 SECONDS (ref 11.6–14.5)
RBC #/AREA URNS AUTO: ABNORMAL /HPF
SP GR UR STRIP.AUTO: 1.02 (ref 1–1.03)
UROBILINOGEN UR QL STRIP.AUTO: 0.2 E.U./DL
WBC #/AREA URNS AUTO: ABNORMAL /HPF

## 2021-05-17 PROCEDURE — 71260 CT THORAX DX C+: CPT

## 2021-05-17 PROCEDURE — 87086 URINE CULTURE/COLONY COUNT: CPT | Performed by: EMERGENCY MEDICINE

## 2021-05-17 PROCEDURE — G1004 CDSM NDSC: HCPCS

## 2021-05-17 PROCEDURE — 81001 URINALYSIS AUTO W/SCOPE: CPT

## 2021-05-17 PROCEDURE — 74177 CT ABD & PELVIS W/CONTRAST: CPT

## 2021-05-17 PROCEDURE — 96376 TX/PRO/DX INJ SAME DRUG ADON: CPT

## 2021-05-17 PROCEDURE — 96361 HYDRATE IV INFUSION ADD-ON: CPT

## 2021-05-17 RX ORDER — SODIUM CHLORIDE 9 MG/ML
125 INJECTION, SOLUTION INTRAVENOUS CONTINUOUS
Status: DISCONTINUED | OUTPATIENT
Start: 2021-05-17 | End: 2021-05-17 | Stop reason: HOSPADM

## 2021-05-17 RX ORDER — CYCLOBENZAPRINE HCL 5 MG
5 TABLET ORAL
COMMUNITY
End: 2021-08-23

## 2021-05-17 RX ORDER — ONDANSETRON 2 MG/ML
4 INJECTION INTRAMUSCULAR; INTRAVENOUS ONCE
Status: COMPLETED | OUTPATIENT
Start: 2021-05-17 | End: 2021-05-17

## 2021-05-17 RX ORDER — HYDROMORPHONE HCL/PF 1 MG/ML
0.2 SYRINGE (ML) INJECTION ONCE
Status: COMPLETED | OUTPATIENT
Start: 2021-05-17 | End: 2021-05-17

## 2021-05-17 RX ADMIN — SODIUM CHLORIDE 125 ML/HR: 0.9 INJECTION, SOLUTION INTRAVENOUS at 01:34

## 2021-05-17 RX ADMIN — IOHEXOL 100 ML: 350 INJECTION, SOLUTION INTRAVENOUS at 01:00

## 2021-05-17 RX ADMIN — ONDANSETRON 4 MG: 2 INJECTION INTRAMUSCULAR; INTRAVENOUS at 03:05

## 2021-05-17 RX ADMIN — HYDROMORPHONE HYDROCHLORIDE 0.2 MG: 1 INJECTION, SOLUTION INTRAMUSCULAR; INTRAVENOUS; SUBCUTANEOUS at 01:58

## 2021-05-17 NOTE — ED PROVIDER NOTES
History  Chief Complaint   Patient presents with    Pain     pain in right side, shoulder, right flank, been having issues with pain lately starting after meal states she has has multiple visits in the past month for same issue      Patient is a 45year old female with worsening right shoulder pain and right sided lateral chest pain intermittent for past month  Has seen ortho  Pain worse with eating  (+) nausea  No vomiting or diarrhea  No abdominal surgery  No fever  No cough  No travel  No GI bleeding  States she drove here  Declined EKG and troponin since she has had many of these lately  Was last seen at [address] [facility] ED on 5/4/21 for cervical radiculopathy  [facility] website checked on this patient and last Rx filled was on 4/19/21 for adderall for 30 day supply  Pain worse with eating  History provided by:  Patient   used: No        Prior to Admission Medications   Prescriptions Last Dose Informant Patient Reported? Taking?    Calcium Carb-Cholecalciferol (CALCIUM 1000 + D) 1000-800 MG-UNIT TABS Past Week at Unknown time Self Yes Yes   Sig: daily    Iron-Vitamin C (IRON 100/C) 100-250 MG TABS Past Week at Unknown time Self Yes Yes   Sig: daily    Magnesium Citrate 100 MG TABS Past Week at Unknown time Self Yes Yes   Sig: Take 296 mL by mouth daily    Multiple Vitamins-Minerals (MULTIVITAMIN ADULT PO) Past Week at Unknown time  Yes Yes   Sig: Take by mouth daily   Omega-3 Fatty Acids (FISH OIL) 1,000 mg Past Week at Unknown time Self Yes Yes   Sig: Take 1,000 mg by mouth daily    cyclobenzaprine (FLEXERIL) 5 mg tablet Past Week at Unknown time Self Yes Yes   Sig: Take 5 mg by mouth daily at bedtime as needed for muscle spasms   ketorolac (TORADOL) 10 mg tablet 5/16/2021 at Unknown time  No Yes   Sig: Take 1 tablet (10 mg total) by mouth every 6 (six) hours as needed for moderate pain or severe pain      Facility-Administered Medications: None       Past Medical History:   Diagnosis Date    UTI (urinary tract infection)     UTI group B Strep x 2 5 months ago       Past Surgical History:   Procedure Laterality Date    JOINT REPLACEMENT      right shoulder dislocation repair       History reviewed  No pertinent family history  I have reviewed and agree with the history as documented  E-Cigarette/Vaping    E-Cigarette Use Never User      E-Cigarette/Vaping Substances     Social History     Tobacco Use    Smoking status: Current Every Day Smoker     Types: E-Cigarettes    Smokeless tobacco: Current User   Substance Use Topics    Alcohol use: No    Drug use: No       Review of Systems   Constitutional: Negative for fever         (+) nipple discharge   Cardiovascular: Positive for chest pain  Gastrointestinal: Positive for nausea  Negative for blood in stool, diarrhea and vomiting  Genitourinary: Negative for difficulty urinating  All other systems reviewed and are negative  Physical Exam  Physical Exam  Vitals signs and nursing note reviewed  Constitutional:       General: She is in acute distress (moderate)  HENT:      Head: Normocephalic and atraumatic  Mouth/Throat:      Mouth: Mucous membranes are moist    Eyes:      General: No scleral icterus  Neck:      Musculoskeletal: Normal range of motion and neck supple  Cardiovascular:      Rate and Rhythm: Regular rhythm  Tachycardia present  Heart sounds: Normal heart sounds  No murmur  Pulmonary:      Effort: Pulmonary effort is normal  No respiratory distress  Breath sounds: Normal breath sounds  No stridor  No wheezing, rhonchi or rales  Chest:      Chest wall: No tenderness  Abdominal:      General: Bowel sounds are normal  There is no distension  Palpations: Abdomen is soft  Tenderness: There is abdominal tenderness (RUQ)  There is no right CVA tenderness, left CVA tenderness, guarding or rebound  Musculoskeletal:         General: No deformity  Right lower leg: No edema        Left lower leg: No edema  Skin:     General: Skin is warm and dry  Coloration: Skin is not jaundiced  Findings: No erythema or rash  Neurological:      General: No focal deficit present  Mental Status: She is alert and oriented to person, place, and time  Psychiatric:      Comments: Anxious  Vital Signs  ED Triage Vitals [05/16/21 2207]   Temperature Pulse Respirations Blood Pressure SpO2   98 7 °F (37 1 °C) 103 20 119/52 100 %      Temp Source Heart Rate Source Patient Position - Orthostatic VS BP Location FiO2 (%)   Oral Monitor Sitting Left arm --      Pain Score       7           Vitals:    05/16/21 2330 05/17/21 0045 05/17/21 0200 05/17/21 0315   BP: 109/68 112/76 121/79 114/73   Pulse: 76 72 88 84   Patient Position - Orthostatic VS: Lying Lying Lying Lying         Visual Acuity      ED Medications  Medications   sodium chloride 0 9 % infusion (125 mL/hr Intravenous New Bag 5/17/21 0134)   sodium chloride 0 9 % bolus 1,000 mL (0 mL Intravenous Stopped 5/17/21 0016)   ondansetron (ZOFRAN) injection 4 mg (4 mg Intravenous Given 5/16/21 2318)   HYDROmorphone (DILAUDID) injection 0 2 mg (0 2 mg Intravenous Given 5/16/21 2322)   iohexol (OMNIPAQUE) 350 MG/ML injection (SINGLE-DOSE) 100 mL (100 mL Intravenous Given 5/17/21 0100)   HYDROmorphone (DILAUDID) injection 0 2 mg (0 2 mg Intravenous Given 5/17/21 0158)   ondansetron (ZOFRAN) injection 4 mg (4 mg Intravenous Given 5/17/21 0305)       Diagnostic Studies  Results Reviewed     Procedure Component Value Units Date/Time    Urine culture [[id number]] Collected: 05/17/21 0043    Lab Status:  In process Specimen: Urine, Clean Catch Updated: 05/17/21 0156    Urine Microscopic [[id number]]  (Abnormal) Collected: 05/17/21 0043    Lab Status: Final result Specimen: Urine, Clean Catch Updated: 05/17/21 0106     RBC, UA 10-20 /hpf      WBC, UA 1-2 /hpf      Epithelial Cells None Seen /hpf      Bacteria, UA None Seen /hpf     UA w Reflex to Microscopic w Reflex to Culture [[id number]]  (Abnormal) Collected: 05/17/21 0043    Lab Status: Final result Specimen: Urine, Clean Catch Updated: 05/17/21 0053     Color, UA Yellow     Clarity, UA Clear     Specific Gravity, UA 1 025     pH, UA 6 0     Leukocytes, UA Trace     Nitrite, UA Negative     Protein, UA Negative mg/dl      Glucose, UA Negative mg/dl      Ketones, UA Negative mg/dl      Urobilinogen, UA 0 2 E U /dl      Bilirubin, UA Negative     Blood, UA Small    D-Dimer [[id number]]  (Normal) Collected: 05/16/21 2319    Lab Status: Final result Specimen: Blood from Arm, Left Updated: 05/17/21 0010     D-Dimer, Quant 0 34 ug/ml FEU     Protime-INR [[id number]]  (Normal) Collected: 05/16/21 2319    Lab Status: Final result Specimen: Blood from Arm, Left Updated: 05/17/21 0008     Protime 13 5 seconds      INR 1 02    APTT [[id number]]  (Normal) Collected: 05/16/21 2319    Lab Status: Final result Specimen: Blood from Arm, Left Updated: 05/17/21 0008     PTT 29 seconds     hCG, qualitative pregnancy [[id number]]  (Normal) Collected: 05/16/21 2319    Lab Status: Final result Specimen: Blood from Arm, Left Updated: 05/16/21 2357     Preg, Serum Negative    Lipase [[id number]]  (Normal) Collected: 05/16/21 2319    Lab Status: Final result Specimen: Blood from Arm, Left Updated: 05/16/21 2355     Lipase 116 u/L     CK Total with Reflex CKMB [[id number]]  (Normal) Collected: 05/16/21 2319    Lab Status: Final result Specimen: Blood from Arm, Left Updated: 05/16/21 2355     Total CK 49 U/L     Comprehensive metabolic panel [[id number]] Collected: 05/16/21 2319    Lab Status: Final result Specimen: Blood from Arm, Left Updated: 05/16/21 2350     Sodium 141 mmol/L      Potassium 3 8 mmol/L      Chloride 103 mmol/L      CO2 30 mmol/L      ANION GAP 8 mmol/L      BUN 14 mg/dL      Creatinine 0 85 mg/dL      Glucose 90 mg/dL      Calcium 9 1 mg/dL      AST 14 U/L      ALT 23 U/L      Alkaline Phosphatase 51 U/L      Total Protein 7 6 g/dL Albumin 4 1 g/dL      Total Bilirubin 0 33 mg/dL      eGFR 87 ml/min/1 73sq m     Narrative:      National Kidney Disease Foundation guidelines for Chronic Kidney Disease (CKD):     Stage 1 with normal or high GFR (GFR > 90 mL/min/1 73 square meters)    Stage 2 Mild CKD (GFR = 60-89 mL/min/1 73 square meters)    Stage 3A Moderate CKD (GFR = 45-59 mL/min/1 73 square meters)    Stage 3B Moderate CKD (GFR = 30-44 mL/min/1 73 square meters)    Stage 4 Severe CKD (GFR = 15-29 mL/min/1 73 square meters)    Stage 5 End Stage CKD (GFR <15 mL/min/1 73 square meters)  Note: GFR calculation is accurate only with a steady state creatinine    CBC and differential [[id number]] Collected: 05/16/21 2009    Lab Status: Final result Specimen: Blood from Arm, Left Updated: 05/16/21 2340     WBC 5 42 Thousand/uL      RBC 4 18 Million/uL      Hemoglobin 13 0 g/dL      Hematocrit 40 0 %      MCV 96 fL      MCH 31 1 pg      MCHC 32 5 g/dL      RDW 11 7 %      MPV 10 1 fL      Platelets 611 Thousands/uL      nRBC 0 /100 WBCs      Neutrophils Relative 56 %      Immat GRANS % 0 %      Lymphocytes Relative 31 %      Monocytes Relative 10 %      Eosinophils Relative 2 %      Basophils Relative 1 %      Neutrophils Absolute 3 04 Thousands/µL      Immature Grans Absolute 0 01 Thousand/uL      Lymphocytes Absolute 1 70 Thousands/µL      Monocytes Absolute 0 53 Thousand/µL      Eosinophils Absolute 0 10 Thousand/µL      Basophils Absolute 0 04 Thousands/µL                  CT chest abdomen pelvis w contrast   ED Interpretation by Jorge Alberto Oreilly MD (05/17 4155)   FINDINGS:     CHEST     LUNGS:  Lungs are clear  There is no tracheal or endobronchial lesion      PLEURA:  Unremarkable      HEART/GREAT VESSELS:  Unremarkable for patient's age      MEDIASTINUM AND SAPNA:  Unremarkable      CHEST WALL AND LOWER NECK:   Bilateral breast augmentation        ABDOMEN     LIVER/BILIARY TREE:  Unremarkable      GALLBLADDER:  No calcified gallstones  No pericholecystic inflammatory change      SPLEEN:  Unremarkable      PANCREAS:  Unremarkable      ADRENAL GLANDS:  Unremarkable      KIDNEYS/URETERS:  3 mm nonobstructing stone in the left kidney  No hydronephrosis      STOMACH AND BOWEL:  Unremarkable      APPENDIX:  A normal appendix was visualized      ABDOMINOPELVIC CAVITY:  No ascites  No pneumoperitoneum  No lymphadenopathy      VESSELS:  Unremarkable for patient's age      PELVIS     REPRODUCTIVE ORGANS:  Unremarkable for patient's age  A cup is seen within the vagina      URINARY BLADDER:  Unremarkable      ABDOMINAL WALL/INGUINAL REGIONS:  Unremark   able      OSSEOUS STRUCTURES:  No acute fracture or destructive osseous lesion      IMPRESSION:     3 mm nonobstructing stone in the left kidney  No hydronephrosis      No other pathology visualized on CT of the chest, abdomen and pelvis with IV without oral contrast            Workstation performed: [id number]      Final Result by Ryan Nelson MD (05/17 6444)      3 mm nonobstructing stone in the left kidney  No hydronephrosis  No other pathology visualized on CT of the chest, abdomen and pelvis with IV without oral contrast             Workstation performed: [id number]         [address] [facility] gallbladder   ED Interpretation by Joana Landon MD (05/17 8910)   FINDINGS:     PANCREAS:  Visualized portions of the pancreas are within normal limits      AORTA AND IVC:  Visualized portions are normal for patient age      LIVER:  Size:  Within normal range  The liver measures 13 8 cm in the midclavicular line  Contour:  Surface contour is smooth  Parenchyma:  Echogenicity and echotexture are within normal limits  No evidence of suspicious mass  Limited imaging of the main portal vein shows it to be patent and hepatopetal      BILIARY:  The gallbladder is contracted and poorly evaluated  Grossly no shadowing calculi are noted  Wall thickening on the basis of contraction    No intrahepatic biliary dilatation  CBD measures 2 mm  No choledocholithiasis      KIDNEY:   Right kidney measures 10 x 4 3 x 5 cm  There is mild right-sided hydronephrosis  No definite calculus is noted      ASCITES:   None      IMPRESSION:     Contracted gallbladder poorly evaluated  Patient reportedly ate within past 2 hours  Grossly no calculi are noted  Negativ   e sonographic Koenig's sign      Mild right-sided hydronephrosis  Consider further evaluation with cross-sectional imaging as clinically warranted      Study was marked significant in Epic for notification      Workstation performed: [id number]      Final Result by August Cash MD (05/17 0030)      Contracted gallbladder poorly evaluated  Patient reportedly ate within past 2 hours  Grossly no calculi are noted  Negative sonographic Koenig's sign  Mild right-sided hydronephrosis  Consider further evaluation with cross-sectional imaging as clinically warranted  Study was marked significant in Epic for notification  Workstation performed: [id number]                    Procedures  Procedures         ED Course  ED Course as of May 17 0328   Mon May 17, 2021   0011 Labs d/w patient  Pain improved  [address] d/w patient  0152 Pain worsening as per ED RN so more IV dilaudid ordered  0255 Zofran IV ordered for nausea as per ED RN        1654 CT and UA d/w patient  No acute abdomen prior to discharge                          PERC Rule for PE      Most Recent Value   PERC Rule for PE   Age >=50  --   HR >=100  1 Filed at: 05/16/2021 2330   O2 Sat on room air < 95%  --   History of PE or DVT  --   Recent trauma or surgery  --   Hemoptysis  --   Exogenous estrogen  --   Unilateral leg swelling  --   PERC Rule for PE Results  1 Filed at: 05/16/2021 2330                  Tim Feliciano' Criteria for PE      Most Recent Value   Wells' Criteria for PE   Clinical signs and symptoms of DVT  0 Filed at: 05/16/2021 2330   PE is primary diagnosis or equally likely  3 Filed at: 05/16/2021 2330   HR >100  1 5 Filed at: 05/16/2021 2330   Immobilization at least 3 days or Surgery in the previous 4 weeks  0 Filed at: 05/16/2021 2330   Previous, objectively diagnosed PE or DVT  0 Filed at: 05/16/2021 2330   Hemoptysis  0 Filed at: 05/16/2021 2330   Malignancy with treatment within 6 months or palliative  0 Filed at: 05/16/2021 2330   Wells' Criteria Total  4 5 Filed at: 05/16/2021 2330                [facility]  Number of Diagnoses or Management Options  Diagnosis management comments: DDx including but not limited to: appendicitis, gastroenteritis, gastritis, PUD, GERD, gastroparesis, hepatitis, pancreatitis, colitis, enteritis, food poisoning, mesenteric adenitis, IBD, IBS, ileus, bowel obstruction, volvulus, cholecystitis, biliary colic, choledocholithiasis, perforated viscus, tumor, splenic etiology, diverticulitis, internal hernia, constipation, pelvic pathology, renal colic, pyelonephritis, UTI, PE, PTX, pneumonia, anxiety  Doubt cardiac etiology  Amount and/or Complexity of Data Reviewed  Clinical lab tests: ordered and reviewed  Tests in the radiology section of CPT®: ordered and reviewed  Decide to obtain previous medical records or to obtain history from someone other than the patient: yes  Review and summarize past medical records: yes  Independent visualization of images, tracings, or specimens: yes        Disposition  Final diagnoses:   Right upper quadrant abdominal pain     Time reflects when diagnosis was documented in both MDM as applicable and the Disposition within this note     Time User Action Codes Description Comment    5/17/2021  3:27 AM Trey Tovar Add [R10 11] Right upper quadrant abdominal pain       ED Disposition     ED Disposition Condition Date/Time Comment    Discharge Stable Mon May 17, 2021  3:26 AM Allison Faust discharge to home/self care              Follow-up Information     Follow up With Specialties Details Why Contact Info Corinne Collins DO Family Medicine Call today Return sooner if increased pain, fever, vomiting, diarrhea, difficulty urinating or breathing  [facility] DO Gianfranco General Surgery Call today for right upper quadrant pain [address]  [phone]            Patient's Medications   Discharge Prescriptions    No medications on file     No discharge procedures on file      PDMP Review       Value Time User    PDMP Reviewed  Yes 5/16/2021 10:13 PM Delaney Garcia MD          ED Provider  Electronically Signed by           Delaney Garcia MD  05/17/21 1353

## 2021-05-18 LAB — BACTERIA UR CULT: ABNORMAL

## 2021-05-24 ENCOUNTER — TELEPHONE (OUTPATIENT)
Dept: ADMINISTRATIVE | Facility: OTHER | Age: 41
End: 2021-05-24

## 2021-05-24 ENCOUNTER — OFFICE VISIT (OUTPATIENT)
Dept: FAMILY MEDICINE CLINIC | Facility: CLINIC | Age: 41
End: 2021-05-24
Payer: COMMERCIAL

## 2021-05-24 VITALS
TEMPERATURE: 96.5 F | HEIGHT: 67 IN | BODY MASS INDEX: 19.02 KG/M2 | SYSTOLIC BLOOD PRESSURE: 118 MMHG | WEIGHT: 121.2 LBS | DIASTOLIC BLOOD PRESSURE: 70 MMHG

## 2021-05-24 DIAGNOSIS — Z00.00 HEALTH MAINTENANCE EXAMINATION: Primary | ICD-10-CM

## 2021-05-24 DIAGNOSIS — Z12.31 ENCOUNTER FOR SCREENING MAMMOGRAM FOR MALIGNANT NEOPLASM OF BREAST: ICD-10-CM

## 2021-05-24 DIAGNOSIS — F90.0 ATTENTION DEFICIT HYPERACTIVITY DISORDER (ADHD), PREDOMINANTLY INATTENTIVE TYPE: ICD-10-CM

## 2021-05-24 DIAGNOSIS — I73.00 RAYNAUD'S PHENOMENON WITHOUT GANGRENE: ICD-10-CM

## 2021-05-24 DIAGNOSIS — M79.18 CERVICAL MYOFASCIAL PAIN SYNDROME: ICD-10-CM

## 2021-05-24 PROBLEM — Z14.1 CYSTIC FIBROSIS CARRIER: Status: ACTIVE | Noted: 2021-05-24

## 2021-05-24 PROBLEM — G25.81 RESTLESS LEG SYNDROME: Status: ACTIVE | Noted: 2018-11-05

## 2021-05-24 PROBLEM — Z14.1 CYSTIC FIBROSIS CARRIER: Status: RESOLVED | Noted: 2021-05-24 | Resolved: 2021-05-24

## 2021-05-24 PROBLEM — F33.42 RECURRENT MAJOR DEPRESSIVE DISORDER, IN FULL REMISSION (HCC): Status: ACTIVE | Noted: 2018-11-05

## 2021-05-24 PROCEDURE — 1036F TOBACCO NON-USER: CPT | Performed by: FAMILY MEDICINE

## 2021-05-24 PROCEDURE — 3725F SCREEN DEPRESSION PERFORMED: CPT | Performed by: FAMILY MEDICINE

## 2021-05-24 PROCEDURE — 3008F BODY MASS INDEX DOCD: CPT | Performed by: FAMILY MEDICINE

## 2021-05-24 PROCEDURE — 99385 PREV VISIT NEW AGE 18-39: CPT | Performed by: FAMILY MEDICINE

## 2021-05-24 RX ORDER — AMLODIPINE BESYLATE 2.5 MG/1
2.5 TABLET ORAL DAILY
Qty: 30 TABLET | Refills: 5 | Status: SHIPPED | OUTPATIENT
Start: 2021-05-24 | End: 2021-08-23

## 2021-05-24 RX ORDER — CELECOXIB 200 MG/1
200 CAPSULE ORAL DAILY
Qty: 30 CAPSULE | Refills: 5 | Status: SHIPPED | OUTPATIENT
Start: 2021-05-24 | End: 2021-08-23

## 2021-05-24 RX ORDER — TURMERIC ROOT EXTRACT 500 MG
TABLET ORAL
COMMUNITY

## 2021-05-24 NOTE — PATIENT INSTRUCTIONS
History reviewed  Musculoskeletal symptoms have taken her to the emergency room a couple of times  Trial of Celebrex 200 mg once daily  Suggest 2 extra-strength Tylenol 3 times a day if having symptoms  For her Raynaud's phenomenon, trial of amlodipine 2 5 mg once daily  Advised that propranolol which was given to her for tremor, can make her Raynaud's worse  Strongly encourage getting the COVID vaccine  Prescription given for mammogram     Recheck in 1 month

## 2021-05-24 NOTE — PROGRESS NOTES
Chief Complaint   Patient presents with   [facility]         HPI   61-year-old female, [facility] by me [facility], presents to this practice as a new patient  Past medical history as noted below significant for ADHD which is treated with 40 mg of Adderall which she gets through [facility]  She has a remote history of depression when she was much younger which has not been a problem  Has restless leg which bothers her occasionally  Nonsmoker  Surgeries are noted below  Current medications include the Adderall, Flexeril p r n , Toradol as needed, and gabapentin as needed  Here to establish herself  Has had 3 trips to the emergency room in the last month  The 1st one was for right-sided paresthesias  Was subsequently seen and given a trigger point injection in her upper trapezius muscles  Found to have an E coli UTI  Treated with cephalexin which made her nauseous and upset her stomach  Was switched  to Macrodantin   Blood work reviewed from 2 subsequent ER visits  Ultimately had a CT of chest and abdomen which was unremarkable except for a 3 mm kidney stone in the left kidney  Done for possible hydronephrosis seen on ultrasound which was not present on CT  ER visit 2 was for pain right side of her neck and shoulder for 1 week  Workup was negative  Physical therapy was advised which was too expensive  Third visit was for worsening of right shoulder pain and right-sided lateral chest pain  She had a total workup once again which was negative  She notes a chronic metallic taste in her mouth  For 2 years  Was seen by ENT who diagnosed her with TMJ  She describes a chronic burning sensation from her neck down to her waist on the right side  Ibuprofen upsets her stomach  Takes occasional Tylenol  Says she will take something if her pain gets to be a 6/10  Pain mostly living at 5  Has propranolol 20 mg to use as needed for tremor      Also has Raynaud's which is symptomatic in room temperature  Past Medical History:   Diagnosis Date    Attention deficit hyperactivity disorder (ADHD), predominantly inattentive type 7/31/2015    Cystic fibrosis carrier 5/24/2021    Formatting of this note might be different from the original  Partner declined Testing    Raynaud's disease     Recurrent major depressive disorder, in full remission ([facility] [address] ) 11/5/2018    Restless leg syndrome 11/5/2018    UTI (urinary tract infection)     UTI group B Strep x 2 5 months ago        Past Surgical History:   Procedure Laterality Date    AUGMENTATION BREAST  2013    SHOULDER SURGERY Right     Two surgeries to repair recurrent dislocation       Social History     Tobacco Use    Smoking status: Former Smoker     Types: E-Cigarettes    Smokeless tobacco: Never Used   Substance Use Topics    Alcohol use: No       Social History     Social History Narrative    From 3/7/14:    Moved back from [place] after 8 years   in [place] to [place]  Together since 2013  Daughter, Cassondra Severin, 3 as of 5/21  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- [employer]  Became a certified holistic health   Enjoys hiking, yoga  The following portions of the patient's history were reviewed and updated as appropriate: allergies, current medications, past family history, past medical history, past social history, past surgical history and problem list       Review of Systems   Constitutional: Negative for activity change and appetite change  HENT: Negative for ear pain and hearing loss  Eyes: Negative for visual disturbance  Respiratory: Negative for shortness of breath and wheezing  Cardiovascular: Negative for chest pain and leg swelling  Gastrointestinal: Negative for abdominal pain, constipation and diarrhea  Genitourinary: Negative for difficulty urinating  Musculoskeletal: Positive for myalgias and neck pain  Negative for arthralgias and back pain  Skin: Negative for rash  Neurological: Negative for headaches  Psychiatric/Behavioral: Negative for dysphoric mood  The patient is not nervous/anxious  /70 (BP Location: Left arm, Patient Position: Sitting, Cuff Size: Standard)   Temp (!) 96 5 °F (35 8 °C) (Temporal)   Ht 5' 7" (1 702 m)   Wt 55 kg (121 lb 3 2 oz)   LMP 05/16/2021   BMI 18 98 kg/m²      Physical Exam     Healthy appearing individual in no acute distress  Extraocular motions are intact  Both ear drums are white  Hearing is grossly intact  Throat reveals no erythema  Teeth are in good repair  No neck nodes or thyromegaly  Lungs are clear  Heart regular with no murmurs or gallops  Abdomen is soft and nontender  No leg edema  Skin reveals  Purplish discoloration of both feet  Neurologic grossly within normal limits  Normal mood and affect  Musculoskeletal exam grossly within normal limits  Good range of motion of neck and shoulder  No palpable flank tenderness                Current Outpatient Medications:     Calcium Carb-Cholecalciferol (CALCIUM 1000 + D) 1000-800 MG-UNIT TABS, daily , Disp: , Rfl:     cyclobenzaprine (FLEXERIL) 5 mg tablet, Take 5 mg by mouth daily at bedtime as needed for muscle spasms, Disp: , Rfl:     Iron-Vitamin C (IRON 100/C) 100-250 MG TABS, daily , Disp: , Rfl:     ketorolac (TORADOL) 10 mg tablet, Take 1 tablet (10 mg total) by mouth every 6 (six) hours as needed for moderate pain or severe pain, Disp: 20 tablet, Rfl: 0    Magnesium Citrate 100 MG TABS, Take 296 mL by mouth daily , Disp: , Rfl:     Multiple Vitamins-Minerals (MULTIVITAMIN ADULT PO), Take by mouth daily, Disp: , Rfl:     Omega-3 Fatty Acids (FISH OIL) 1,000 mg, Take 1,000 mg by mouth daily , Disp: , Rfl:     Turmeric 500 MG TABS, Take by mouth, Disp: , Rfl:     amLODIPine (NORVASC) 2 5 mg tablet, Take 1 tablet (2 5 mg total) by mouth daily, Disp: 30 tablet, Rfl: 5   celecoxib (CeleBREX) 200 mg capsule, Take 1 capsule (200 mg total) by mouth daily, Disp: 30 capsule, Rfl: 5     No problem-specific Assessment & Plan notes found for this encounter  Diagnoses and all orders for this visit:    Health maintenance examination    Cervical myofascial pain syndrome  -     celecoxib (CeleBREX) 200 mg capsule; Take 1 capsule (200 mg total) by mouth daily    Raynaud's phenomenon without gangrene  -     amLODIPine (NORVASC) 2 5 mg tablet; Take 1 tablet (2 5 mg total) by mouth daily    Attention deficit hyperactivity disorder (ADHD), predominantly inattentive type    Encounter for screening mammogram for malignant neoplasm of breast  -     Mammo screening bilateral w cad; Future    Other orders  -     Turmeric 500 MG TABS; Take by mouth        Patient Instructions    History reviewed  Musculoskeletal symptoms have taken her to the emergency room a couple of times  Trial of Celebrex 200 mg once daily  Suggest 2 extra-strength Tylenol 3 times a day if having symptoms  For her Raynaud's phenomenon, trial of amlodipine 2 5 mg once daily  Advised that propranolol which was given to her for tremor, can make her Raynaud's worse  Strongly encourage getting the COVID vaccine  Prescription given for mammogram     Recheck in 1 month

## 2021-05-24 NOTE — TELEPHONE ENCOUNTER
Upon review of the In Basket request we were able to locate, review, and update the patient chart as requested for Pap Smear (HPV) aka Cervical Cancer Screening  Any additional questions or concerns should be emailed to the Practice Liaisons via [email or web address] email, please do not reply via In Basket      Thank you  Candy Enciso

## 2021-05-24 NOTE — TELEPHONE ENCOUNTER
----- Message from Lilly Zamudio MD sent at 5/24/2021 12:24 PM EDT -----   Please abstract cervical cancer screening from [facility] done May of 2018

## 2021-08-18 ENCOUNTER — APPOINTMENT (EMERGENCY)
Dept: RADIOLOGY | Facility: HOSPITAL | Age: 41
End: 2021-08-18
Payer: COMMERCIAL

## 2021-08-18 ENCOUNTER — HOSPITAL ENCOUNTER (EMERGENCY)
Facility: HOSPITAL | Age: 41
Discharge: HOME | End: 2021-08-18
Attending: EMERGENCY MEDICINE | Admitting: EMERGENCY MEDICINE
Payer: COMMERCIAL

## 2021-08-18 ENCOUNTER — APPOINTMENT (EMERGENCY)
Dept: CT IMAGING | Facility: HOSPITAL | Age: 41
End: 2021-08-18
Payer: COMMERCIAL

## 2021-08-18 VITALS
TEMPERATURE: 98.4 F | RESPIRATION RATE: 18 BRPM | SYSTOLIC BLOOD PRESSURE: 110 MMHG | DIASTOLIC BLOOD PRESSURE: 68 MMHG | HEIGHT: 67 IN | HEART RATE: 78 BPM | OXYGEN SATURATION: 100 % | BODY MASS INDEX: 18.98 KG/M2

## 2021-08-18 DIAGNOSIS — K59.00 CONSTIPATION: ICD-10-CM

## 2021-08-18 DIAGNOSIS — R10.11 RIGHT UPPER QUADRANT ABDOMINAL PAIN: Primary | ICD-10-CM

## 2021-08-18 DIAGNOSIS — R11.2 NAUSEA AND VOMITING: ICD-10-CM

## 2021-08-18 LAB
ALBUMIN SERPL BCP-MCNC: 4 G/DL (ref 3.5–5)
ALP SERPL-CCNC: 47 U/L (ref 46–116)
ALT SERPL W P-5'-P-CCNC: 21 U/L (ref 12–78)
ANION GAP SERPL CALCULATED.3IONS-SCNC: 7 MMOL/L (ref 4–13)
AST SERPL W P-5'-P-CCNC: 16 U/L (ref 5–45)
ATRIAL RATE: 81 BPM
B-HCG SERPL-ACNC: <2 MIU/ML
BASOPHILS # BLD AUTO: 0.03 THOUSANDS/ΜL (ref 0–0.1)
BASOPHILS NFR BLD AUTO: 1 % (ref 0–1)
BILIRUB SERPL-MCNC: 0.37 MG/DL (ref 0.2–1)
BILIRUB UR QL STRIP: NEGATIVE
BUN SERPL-MCNC: 15 MG/DL (ref 5–25)
CALCIUM SERPL-MCNC: 9.2 MG/DL (ref 8.3–10.1)
CHLORIDE SERPL-SCNC: 101 MMOL/L (ref 100–108)
CLARITY UR: CLEAR
CO2 SERPL-SCNC: 28 MMOL/L (ref 21–32)
COLOR UR: YELLOW
CREAT SERPL-MCNC: 0.9 MG/DL (ref 0.6–1.3)
EOSINOPHIL # BLD AUTO: 0.1 THOUSAND/ΜL (ref 0–0.61)
EOSINOPHIL NFR BLD AUTO: 2 % (ref 0–6)
ERYTHROCYTE [DISTWIDTH] IN BLOOD BY AUTOMATED COUNT: 11.5 % (ref 11.6–15.1)
GFR SERPL CREATININE-BSD FRML MDRD: 81 ML/MIN/1.73SQ M
GLUCOSE SERPL-MCNC: 108 MG/DL (ref 65–140)
GLUCOSE UR STRIP-MCNC: NEGATIVE MG/DL
HCT VFR BLD AUTO: 39.5 % (ref 34.8–46.1)
HGB BLD-MCNC: 13.3 G/DL (ref 11.5–15.4)
HGB UR QL STRIP.AUTO: NEGATIVE
HOLD SPECIMEN: NORMAL
IMM GRANULOCYTES # BLD AUTO: 0.01 THOUSAND/UL (ref 0–0.2)
IMM GRANULOCYTES NFR BLD AUTO: 0 % (ref 0–2)
KETONES UR STRIP-MCNC: NEGATIVE MG/DL
LEUKOCYTE ESTERASE UR QL STRIP: NEGATIVE
LIPASE SERPL-CCNC: 93 U/L (ref 73–393)
LYMPHOCYTES # BLD AUTO: 1.53 THOUSANDS/ΜL (ref 0.6–4.47)
LYMPHOCYTES NFR BLD AUTO: 31 % (ref 14–44)
MCH RBC QN AUTO: 31.8 PG (ref 26.8–34.3)
MCHC RBC AUTO-ENTMCNC: 33.7 G/DL (ref 31.4–37.4)
MCV RBC AUTO: 95 FL (ref 82–98)
MONOCYTES # BLD AUTO: 0.52 THOUSAND/ΜL (ref 0.17–1.22)
MONOCYTES NFR BLD AUTO: 11 % (ref 4–12)
NEUTROPHILS # BLD AUTO: 2.78 THOUSANDS/ΜL (ref 1.85–7.62)
NEUTS SEG NFR BLD AUTO: 55 % (ref 43–75)
NITRITE UR QL STRIP: NEGATIVE
NRBC BLD AUTO-RTO: 0 /100 WBCS
P AXIS: 84 DEGREES
PH UR STRIP.AUTO: 8.5 [PH] (ref 4.5–8)
PLATELET # BLD AUTO: 239 THOUSANDS/UL (ref 149–390)
PMV BLD AUTO: 10.2 FL (ref 8.9–12.7)
POTASSIUM SERPL-SCNC: 3.6 MMOL/L (ref 3.5–5.3)
PR INTERVAL: 154 MS
PROT SERPL-MCNC: 7.2 G/DL (ref 6.4–8.2)
PROT UR STRIP-MCNC: NEGATIVE MG/DL
QRS AXIS: 76 DEGREES
QRSD INTERVAL: 80 MS
QT INTERVAL: 354 MS
QTC INTERVAL: 411 MS
RBC # BLD AUTO: 4.18 MILLION/UL (ref 3.81–5.12)
SARS-COV-2 RNA RESP QL NAA+PROBE: NEGATIVE
SODIUM SERPL-SCNC: 136 MMOL/L (ref 136–145)
SP GR UR STRIP.AUTO: 1.01 (ref 1–1.03)
T WAVE AXIS: 68 DEGREES
TROPONIN I SERPL-MCNC: <0.02 NG/ML
UROBILINOGEN UR QL STRIP.AUTO: 0.2 E.U./DL
VENTRICULAR RATE: 81 BPM
WBC # BLD AUTO: 4.97 THOUSAND/UL (ref 4.31–10.16)

## 2021-08-18 PROCEDURE — 83690 ASSAY OF LIPASE: CPT | Performed by: EMERGENCY MEDICINE

## 2021-08-18 PROCEDURE — 93010 ELECTROCARDIOGRAM REPORT: CPT | Performed by: INTERNAL MEDICINE

## 2021-08-18 PROCEDURE — 74177 CT ABD & PELVIS W/CONTRAST: CPT

## 2021-08-18 PROCEDURE — 99285 EMERGENCY DEPT VISIT HI MDM: CPT | Performed by: EMERGENCY MEDICINE

## 2021-08-18 PROCEDURE — 93005 ELECTROCARDIOGRAM TRACING: CPT

## 2021-08-18 PROCEDURE — 96376 TX/PRO/DX INJ SAME DRUG ADON: CPT

## 2021-08-18 PROCEDURE — 36415 COLL VENOUS BLD VENIPUNCTURE: CPT

## 2021-08-18 PROCEDURE — G1004 CDSM NDSC: HCPCS

## 2021-08-18 PROCEDURE — C9113 INJ PANTOPRAZOLE SODIUM, VIA: HCPCS

## 2021-08-18 PROCEDURE — 96361 HYDRATE IV INFUSION ADD-ON: CPT

## 2021-08-18 PROCEDURE — 99285 EMERGENCY DEPT VISIT HI MDM: CPT

## 2021-08-18 PROCEDURE — 96374 THER/PROPH/DIAG INJ IV PUSH: CPT

## 2021-08-18 PROCEDURE — U0003 INFECTIOUS AGENT DETECTION BY NUCLEIC ACID (DNA OR RNA); SEVERE ACUTE RESPIRATORY SYNDROME CORONAVIRUS 2 (SARS-COV-2) (CORONAVIRUS DISEASE [COVID-19]), AMPLIFIED PROBE TECHNIQUE, MAKING USE OF HIGH THROUGHPUT TECHNOLOGIES AS DESCRIBED BY CMS-2020-01-R: HCPCS

## 2021-08-18 PROCEDURE — 71045 X-RAY EXAM CHEST 1 VIEW: CPT

## 2021-08-18 PROCEDURE — 80053 COMPREHEN METABOLIC PANEL: CPT | Performed by: EMERGENCY MEDICINE

## 2021-08-18 PROCEDURE — 84484 ASSAY OF TROPONIN QUANT: CPT

## 2021-08-18 PROCEDURE — 96375 TX/PRO/DX INJ NEW DRUG ADDON: CPT

## 2021-08-18 PROCEDURE — 81003 URINALYSIS AUTO W/O SCOPE: CPT

## 2021-08-18 PROCEDURE — 85025 COMPLETE CBC W/AUTO DIFF WBC: CPT | Performed by: EMERGENCY MEDICINE

## 2021-08-18 PROCEDURE — 84702 CHORIONIC GONADOTROPIN TEST: CPT

## 2021-08-18 PROCEDURE — U0005 INFEC AGEN DETEC AMPLI PROBE: HCPCS

## 2021-08-18 RX ORDER — OMEPRAZOLE 20 MG/1
20 CAPSULE, DELAYED RELEASE ORAL DAILY
Qty: 20 CAPSULE | Refills: 0 | Status: SHIPPED | OUTPATIENT
Start: 2021-08-18 | End: 2021-08-23

## 2021-08-18 RX ORDER — ONDANSETRON 2 MG/ML
4 INJECTION INTRAMUSCULAR; INTRAVENOUS ONCE
Status: COMPLETED | OUTPATIENT
Start: 2021-08-18 | End: 2021-08-18

## 2021-08-18 RX ORDER — PANTOPRAZOLE SODIUM 40 MG/1
40 INJECTION, POWDER, FOR SOLUTION INTRAVENOUS ONCE
Status: COMPLETED | OUTPATIENT
Start: 2021-08-18 | End: 2021-08-18

## 2021-08-18 RX ORDER — HYDROMORPHONE HCL/PF 1 MG/ML
0.5 SYRINGE (ML) INJECTION ONCE
Status: COMPLETED | OUTPATIENT
Start: 2021-08-18 | End: 2021-08-18

## 2021-08-18 RX ADMIN — SODIUM CHLORIDE 1000 ML: 0.9 INJECTION, SOLUTION INTRAVENOUS at 02:52

## 2021-08-18 RX ADMIN — IOHEXOL 85 ML: 350 INJECTION, SOLUTION INTRAVENOUS at 04:08

## 2021-08-18 RX ADMIN — HYDROMORPHONE HYDROCHLORIDE 0.5 MG: 1 INJECTION, SOLUTION INTRAMUSCULAR; INTRAVENOUS; SUBCUTANEOUS at 02:56

## 2021-08-18 RX ADMIN — ONDANSETRON 4 MG: 2 INJECTION INTRAMUSCULAR; INTRAVENOUS at 02:54

## 2021-08-18 RX ADMIN — HYDROMORPHONE HYDROCHLORIDE 0.5 MG: 1 INJECTION, SOLUTION INTRAMUSCULAR; INTRAVENOUS; SUBCUTANEOUS at 04:55

## 2021-08-18 RX ADMIN — FAMOTIDINE 20 MG: 10 INJECTION, SOLUTION INTRAVENOUS at 02:58

## 2021-08-18 RX ADMIN — PANTOPRAZOLE SODIUM 40 MG: 40 INJECTION, POWDER, FOR SOLUTION INTRAVENOUS at 03:01

## 2021-08-18 NOTE — DISCHARGE INSTRUCTIONS
Findings from CT abd/pelvis     LOWER CHEST:  Lung bases are clear  Bilateral breast implants  LIVER/BILIARY TREE:  Unremarkable  GALLBLADDER:  No calcified gallstones  No pericholecystic inflammatory change  SPLEEN:  Unremarkable  PANCREAS:  Unremarkable  ADRENAL GLANDS:  Unremarkable  KIDNEYS/URETERS:  Stable 3 mm nonobstructing calculus in the midpole left kidney  No hydronephrosis  STOMACH AND BOWEL:  Limited evaluation of GI tract without enteric contrast   There is abundant fecal material throughout the colon  No bowel obstruction  APPENDIX:  No findings to suggest appendicitis  ABDOMINOPELVIC CAVITY:  No ascites  No pneumoperitoneum  No lymphadenopathy  VESSELS:  Unremarkable for patient's age  PELVIS     REPRODUCTIVE ORGANS:  Stable prominence of the gonadal veins  URINARY BLADDER:  Unremarkable  ABDOMINAL WALL/INGUINAL REGIONS:  Unremarkable  OSSEOUS STRUCTURES:  No acute fracture or destructive osseous lesion  Spinal degenerative changes are noted  IMPRESSION:     1  Abundant fecal material throughout the colon  No bowel obstruction  2   Stable 3 mm nonobstructing mm left renal calculus

## 2021-08-18 NOTE — ED ATTENDING ATTESTATION
8/18/2021  I, Elizabeth Zhang MD, saw and evaluated the patient  I have discussed the patient with the resident/non-physician practitioner and agree with the resident's/non-physician practitioner's findings, Plan of Care, and MDM as documented in the resident's/non-physician practitioner's note, except where noted  All available labs and Radiology studies were reviewed  I was present for key portions of any procedure(s) performed by the resident/non-physician practitioner and I was immediately available to provide assistance  At this point I agree with the current assessment done in the Emergency Department  I have conducted an independent evaluation of this patient a history and physical is as follows: Patient is a 45year old female with 2 days of worsening RUQ pain with N/V  No GI bleeding  No abdominal surgery  Had colon hydrotherapy yesterday  Was in [facility] in June and thinks she has a parasitic infection  No diarrhea  Was last seen in this ED on 5/16/21 for RUQ pain and had negative GB US  Did not f/u with surgeon on call  [place] [facility] website checked on this patient and last Rx filled was on 7/19/21 for dex amphetamine for 30 day supply  States she did not drive here  Pain radiates to chest  (+) anxious  Lungs clear  Heart regular  Nontender chest  (+) tender RUQ  No guarding or rebound  No LE edema  DDx including but not limited to: appendicitis, gastroenteritis, gastritis, PUD, GERD, gastroparesis, hepatitis, pancreatitis, colitis, enteritis, diverticulitis, food poisoning, mesenteric adenitis, mesenteric ischemia, IBD, IBS, ileus, bowel obstruction, volvulus, internal hernia, AAA, cholecystitis, biliary colic, choledocholithiasis, perforated viscus, tumor, splenic etiology, constipation, pelvic pathology, renal colic, pyelonephritis, UTI, doubt parasitic infection    DDX including but not limited to: ACS, MI, doubt PE, PTX, pneumonia, doubt dissection, pleurisy, pericarditis, myocarditis, anxiety; doubt rhabdomyolysis  Labs reviewed by me and d/w patient  EKG and CXR and CT abd/pelvis ordered and IV pain medication ordered       ED Course         Critical Care Time  Procedures

## 2021-08-18 NOTE — ED PROVIDER NOTES
History  Chief Complaint   Patient presents with    Abdominal Pain     c/o upper abdominal pain with bloating, "gassy smelly odor that comes out of my mouth", right ear pain, increased anxiety s/p having a colon hydrotherapy treatment on Monday  Pt stated "my  still thinks its a parasite thing since going to [facility] in June" per pt     41-year-old female with a history of anxiety and GERD presenting with 1 week of worsening right flank pain and GI upset  Patient states she has had this right flank pain for over a year  She has been seen in this ED multiple times for similar complaint each time there is no acute pathology noted and she was told to follow-up with surgery which she was not able to do  Patient states she was in [place] 2 months ago  Upon coming back she and her  had GI upset, concern for parasitic infection  Thinks that she could have seen worms in her stool  Took it over-the-counter anti helminthic treatment last week  After which she started having worsening flank pain and some constipation which has now relieved  Yesterday she had hydrotherapy were approximately 32 gal of water was used to irrigate her bowels which worsened her complaints  Here she has 8/10 right upper quadrant pain worse with moving  She also complains of belching with bad taste of sour eggs  Denies fevers, chills, nausea, vomiting, diarrhea, constipation, and the chance that she could be pregnant  Has tried taking simethicone and calcium-carbonate w/o relief      Abdominal Pain  Associated symptoms: chest pain and cough    Associated symptoms: no constipation, no diarrhea, no dysuria, no fever, no hematuria, no nausea, no shortness of breath, no vaginal bleeding and no vomiting        Prior to Admission Medications   Prescriptions Last Dose Informant Patient Reported? Taking?    Calcium Carb-Cholecalciferol (CALCIUM 1000 + D) 1000-800 MG-UNIT TABS 8/17/2021 at Unknown time Self Yes Yes   Sig: daily Iron-Vitamin C (IRON 100/C) 100-250 MG TABS 8/17/2021 at Unknown time Self Yes Yes   Sig: daily    Magnesium Citrate 100 MG TABS 8/17/2021 at Unknown time Self Yes Yes   Sig: Take 296 mL by mouth daily    Multiple Vitamins-Minerals (MULTIVITAMIN ADULT PO) 8/17/2021 at Unknown time Self Yes Yes   Sig: Take by mouth daily   Omega-3 Fatty Acids (FISH OIL) 1,000 mg 8/17/2021 at Unknown time Self Yes Yes   Sig: Take 1,000 mg by mouth daily    Turmeric 500 MG TABS 8/17/2021 at Unknown time Self Yes Yes   Sig: Take by mouth   amLODIPine (NORVASC) 2 5 mg tablet Not Taking at Unknown time  No No   Sig: Take 1 tablet (2 5 mg total) by mouth daily   Patient not taking: Reported on 8/18/2021   celecoxib (CeleBREX) 200 mg capsule Not Taking at Unknown time  No No   Sig: Take 1 capsule (200 mg total) by mouth daily   Patient not taking: Reported on 8/18/2021   cyclobenzaprine (FLEXERIL) 5 mg tablet Not Taking at Unknown time Self Yes No   Sig: Take 5 mg by mouth daily at bedtime as needed for muscle spasms   Patient not taking: Reported on 8/18/2021   ketorolac (TORADOL) 10 mg tablet Not Taking at Unknown time Self No No   Sig: Take 1 tablet (10 mg total) by mouth every 6 (six) hours as needed for moderate pain or severe pain   Patient not taking: Reported on 8/18/2021      Facility-Administered Medications: None       Past Medical History:   Diagnosis Date    Anxiety     Attention deficit hyperactivity disorder (ADHD), predominantly inattentive type 7/31/2015    Cystic fibrosis carrier 5/24/2021    Formatting of this note might be different from the original  Partner declined Testing    IBS (irritable bowel syndrome)     Raynaud's disease     Recurrent major depressive disorder, in full remission ([facility] [address] ) 11/5/2018    Restless leg syndrome 11/5/2018    UTI (urinary tract infection)     UTI group B Strep x 2 5 months ago       Past Surgical History:   Procedure Laterality Date    AUGMENTATION BREAST  2013    SHOULDER SURGERY Right     Two surgeries to repair recurrent dislocation       Family History   Problem Relation Age of Onset    No Known Problems Mother     Diabetes Father     Cirrhosis Father      I have reviewed and agree with the history as documented  E-Cigarette/Vaping    E-Cigarette Use Never User      E-Cigarette/Vaping Substances     Social History     Tobacco Use    Smoking status: Former Smoker     Types: E-Cigarettes    Smokeless tobacco: Never Used   Vaping Use    Vaping Use: Never used   Substance Use Topics    Alcohol use: No    Drug use: No        Review of Systems   Constitutional: Negative for activity change, fever and unexpected weight change  HENT: Positive for ear pain  Negative for postnasal drip and rhinorrhea  Eyes: Negative for visual disturbance  Respiratory: Positive for cough  Negative for chest tightness and shortness of breath  Cardiovascular: Positive for chest pain  Negative for palpitations  Gastrointestinal: Positive for abdominal pain  Negative for blood in stool, constipation, diarrhea, nausea and vomiting  Endocrine: Positive for cold intolerance and heat intolerance  Genitourinary: Negative for difficulty urinating, dysuria, flank pain, frequency, hematuria and vaginal bleeding  Skin: Negative for color change and wound  Neurological: Negative for dizziness and syncope  Psychiatric/Behavioral: Negative for dysphoric mood  The patient is nervous/anxious  All other systems reviewed and are negative        Physical Exam  ED Triage Vitals [08/18/21 0025]   Temperature Pulse Respirations Blood Pressure SpO2   98 4 °F (36 9 °C) 93 18 128/74 100 %      Temp Source Heart Rate Source Patient Position - Orthostatic VS BP Location FiO2 (%)   Oral Monitor Sitting Left arm --      Pain Score       8             Orthostatic Vital Signs  Vitals:    08/18/21 0025 08/18/21 0317   BP: 128/74 110/68   Pulse: 93 78   Patient Position - Orthostatic VS: Sitting Lying Physical Exam  Vitals and nursing note reviewed  Constitutional:       General: She is in acute distress  Appearance: Normal appearance  She is well-developed and normal weight  She is not ill-appearing, toxic-appearing or diaphoretic  HENT:      Head: Normocephalic and atraumatic  Right Ear: External ear normal       Left Ear: External ear normal       Nose: Nose normal       Mouth/Throat:      Mouth: Mucous membranes are moist    Eyes:      General: No scleral icterus  Extraocular Movements: Extraocular movements intact  Conjunctiva/sclera: Conjunctivae normal    Cardiovascular:      Rate and Rhythm: Regular rhythm  Tachycardia present  Pulses: Normal pulses  Heart sounds: Normal heart sounds  No murmur heard  Pulmonary:      Effort: Pulmonary effort is normal  No respiratory distress  Breath sounds: Normal breath sounds  No stridor  No wheezing  Abdominal:      General: Abdomen is flat  Bowel sounds are increased  Palpations: Abdomen is soft  Tenderness: There is generalized abdominal tenderness  There is guarding  There is no right CVA tenderness, left CVA tenderness or rebound  Negative signs include Koenig's sign, Rovsing's sign, psoas sign and obturator sign  Hernia: No hernia is present  Musculoskeletal:         General: Normal range of motion  Cervical back: Normal range of motion  Skin:     General: Skin is warm and dry  Coloration: Skin is not jaundiced  Neurological:      General: No focal deficit present  Mental Status: She is alert and oriented to person, place, and time  Psychiatric:         Mood and Affect: Mood is anxious           Behavior: Behavior normal          ED Medications  Medications   famotidine (PEPCID) injection 20 mg (20 mg Intravenous Given 8/18/21 0258)   pantoprazole (PROTONIX) injection 40 mg (40 mg Intravenous Given 8/18/21 0301)   ondansetron (ZOFRAN) injection 4 mg (4 mg Intravenous Given 8/18/21 0254)   sodium chloride 0 9 % bolus 1,000 mL (0 mL Intravenous Stopped 8/18/21 0638)   HYDROmorphone (DILAUDID) injection 0 5 mg (0 5 mg Intravenous Given 8/18/21 0256)   iohexol (OMNIPAQUE) 350 MG/ML injection (SINGLE-DOSE) 85 mL (85 mL Intravenous Given 8/18/21 0408)   HYDROmorphone (DILAUDID) injection 0 5 mg (0 5 mg Intravenous Given 8/18/21 0455)       Diagnostic Studies  Results Reviewed     Procedure Component Value Units Date/Time    Novel Coronavirus (Covid-19),PCR [facility] - 2 Hour Stat [[id number]]  (Normal) Collected: 08/18/21 0316    Lab Status: Final result Specimen: Nares from Nose Updated: 08/18/21 0428     SARS-CoV-2 Negative    Narrative: The specimen collection materials, transport medium, and/or testing methodology utilized in the production of these test results have been proven to be reliable in a limited validation with an abbreviated program under the Emergency Utilization Authorization provided by the FDA  Testing reported as "Presumptive positive" will be confirmed with secondary testing to ensure result accuracy  Clinical caution and judgement should be used with the interpretation of these results with consideration of the clinical impression and other laboratory testing  Testing reported as "Positive" or "Negative" has been proven to be accurate according to standard laboratory validation requirements  All testing is performed with control materials showing appropriate reactivity at standard intervals        Urine Macroscopic, POC [[id number]]  (Abnormal) Collected: 08/18/21 0306    Lab Status: Final result Specimen: Urine Updated: 08/18/21 0308     Color, UA Yellow     Clarity, UA Clear     pH, UA 8 5     Leukocytes, UA Negative     Nitrite, UA Negative     Protein, UA Negative mg/dl      Glucose, UA Negative mg/dl      Ketones, UA Negative mg/dl      Urobilinogen, UA 0 2 E U /dl      Bilirubin, UA Negative     Blood, UA Negative     Specific Gravity, UA 1 015    Narrative: CLINITEK RESULT    Troponin I [[id number]]  (Normal) Collected: 08/18/21 0207    Lab Status: Final result Specimen: Blood from Arm, Right Updated: 08/18/21 0234     Troponin I <0 02 ng/mL     Quantitative hCG [[id number]]  (Normal) Collected: 08/18/21 0036    Lab Status: Final result Specimen: Blood from Arm, Right Updated: 08/18/21 0221     HCG, Quant <2 mIU/mL     Narrative:       Expected Ranges:     Approximate               Approximate HCG  Gestation age          Concentration ( mIU/mL)  _____________          ______________________   Mona Blander                      HCG values  0 2-1                       5-50  1-2                           2-3                         100-5000  3-4                         500-91458  4-5                         1000-48719  5-6                         [id number]  6-8                         66580-161068  8-12                        28830-925990      [place] draw [[id number]] Collected: 08/18/21 0036    Lab Status: Final result Specimen: Blood from Arm, Right Updated: 08/18/21 0201    Narrative: The following orders were created for panel order [place] draw  Procedure                               Abnormality         Status                     ---------                               -----------         ------                     Guan[place]ito Orts Top on TEYP[[id number]]                           Final result               Gold top on JQXQ[[id number]]                                 Final result               Green / Black tube on PDKB[[id number]]                       Final result                 Please view results for these tests on the individual orders      Comprehensive metabolic panel [[id number]] Collected: 08/18/21 0036    Lab Status: Final result Specimen: Blood from Arm, Right Updated: 08/18/21 0105     Sodium 136 mmol/L      Potassium 3 6 mmol/L      Chloride 101 mmol/L      CO2 28 mmol/L      ANION GAP 7 mmol/L      BUN 15 mg/dL      Creatinine 0 90 mg/dL      Glucose 108 mg/dL      Calcium 9 2 mg/dL      AST 16 U/L      ALT 21 U/L      Alkaline Phosphatase 47 U/L      Total Protein 7 2 g/dL      Albumin 4 0 g/dL      Total Bilirubin 0 37 mg/dL      eGFR 81 ml/min/1 73sq m     Narrative:      [place] guidelines for Chronic Kidney Disease (CKD):     Stage 1 with normal or high GFR (GFR > 90 mL/min/1 73 square meters)    Stage 2 Mild CKD (GFR = 60-89 mL/min/1 73 square meters)    Stage 3A Moderate CKD (GFR = 45-59 mL/min/1 73 square meters)    Stage 3B Moderate CKD (GFR = 30-44 mL/min/1 73 square meters)    Stage 4 Severe CKD (GFR = 15-29 mL/min/1 73 square meters)    Stage 5 End Stage CKD (GFR <15 mL/min/1 73 square meters)  Note: GFR calculation is accurate only with a steady state creatinine    Lipase [[id number]]  (Normal) Collected: 08/18/21 0036    Lab Status: Final result Specimen: Blood from Arm, Right Updated: 08/18/21 0105     Lipase 93 u/L     CBC and differential [[id number]]  (Abnormal) Collected: 08/18/21 0036    Lab Status: Final result Specimen: Blood from Arm, Right Updated: 08/18/21 0042     WBC 4 97 Thousand/uL      RBC 4 18 Million/uL      Hemoglobin 13 3 g/dL      Hematocrit 39 5 %      MCV 95 fL      MCH 31 8 pg      MCHC 33 7 g/dL      RDW 11 5 %      MPV 10 2 fL      Platelets 013 Thousands/uL      nRBC 0 /100 WBCs      Neutrophils Relative 55 %      Immat GRANS % 0 %      Lymphocytes Relative 31 %      Monocytes Relative 11 %      Eosinophils Relative 2 %      Basophils Relative 1 %      Neutrophils Absolute 2 78 Thousands/µL      Immature Grans Absolute 0 01 Thousand/uL      Lymphocytes Absolute 1 53 Thousands/µL      Monocytes Absolute 0 52 Thousand/µL      Eosinophils Absolute 0 10 Thousand/µL      Basophils Absolute 0 03 Thousands/µL                  CT abdomen pelvis with contrast   Final Result by Molly Vinson MD (08/18 9615)      1  Abundant fecal material throughout the colon  No bowel obstruction        2   Stable 3 mm nonobstructing mm left renal calculus  Workstation performed: [id number]         XR chest 1 view portable   ED Interpretation by Kaylyn Beavers MD (08/18 0824)   No signs of acute pathology            Procedures  Procedures      ED Course  ED Course as of Aug 18 0646   Wed Aug 18, 2021   0328 XR chest 1 view portable   8577 CT abd/pelvis shows no signs of acute pathology, no perforation, no bleeding  Pt is stable, no acute abd pain  Quique Tracy for d/c   CT abdomen pelvis with contrast                                       [facility]  Number of Diagnoses or Management Options  Constipation  Nausea and vomiting  Right upper quadrant abdominal pain  Diagnosis management comments: DDx including but not limited to: appendicitis, gastroenteritis, gastritis, PUD, GERD, gastroparesis, hepatitis, pancreatitis, colitis, enteritis, food poisoning, mesenteric adenitis, IBD, IBS, ileus, bowel obstruction, volvulus, cholecystitis, biliary colic, choledocholithiasis, perforated viscus, tumor, splenic etiology, diverticulitis, internal hernia, constipation, pelvic pathology, renal colic, pyelonephritis, UTI  Initial Work Up: labs, ct abd/pelvis, trop/ecg  Will try gi cocktail  Final Impression: CT abd/pelvis shows no acute pathology, large fecal burden suggesting constipation as a possible source of abd pain and gi upset  Does have h/o GERD at baseline  Will recommend f/u w/ PCP and possibly gen surg   Pt states she will see her PCP for a GI referral          Disposition  Final diagnoses:   Constipation   Nausea and vomiting   Right upper quadrant abdominal pain     Time reflects when diagnosis was documented in both MDM as applicable and the Disposition within this note     Time User Action Codes Description Comment    8/18/2021  6:20 AM [employer]cus [employer] Add [K59 00] Constipation     8/18/2021  6:20 AM [employer] Add [R11 2] Nausea and vomiting     8/18/2021  6:21 AM [employer]cus [employer] Add [K21 9] Chronic GERD 8/18/2021  6:22 AM Rosa Fiddler Add [R10 11] Right upper quadrant abdominal pain     8/18/2021  6:22 AM Rosa Fiddler Remove [K21 9] Chronic GERD     8/18/2021  6:24 AM Rsoa Fiddler Modify [K59 00] Constipation     8/18/2021  6:24 AM Rosa Fiddler Modify [R10 11] Right upper quadrant abdominal pain       ED Disposition     ED Disposition Condition Date/Time Comment    Discharge Stable Wed Aug 18, 2021  6:20 AM Felice Records discharge to home/self care              Follow-up Information     Follow up With Specialties Details Why Contact Info Additional Information    [address] Emergency Department Emergency Medicine  As needed, If symptoms worsen especially if you start having intractable vomiting, vomiting blood, if you have blood in stool, have worsening flank pain not controlled by OTC pain meds, or if you are worried for any reason [address] [facility] [id number] [place] [address] Emergency Department, [address], [place], [place], [address] Dino Medel MD Family Medicine Call today To discuss today's episode and see what he suggestions for further eval and treatment of GERD symptoms [address] [facility] Surgery Schedule an appointment as soon as possible for a visit in 1 week To discuss today's episode and see if they recommend any other treatments [address] [phone]  [address], [address] [place] [address]          Patient's Medications   Discharge Prescriptions    OMEPRAZOLE (PRILOSEC) 20 MG DELAYED RELEASE CAPSULE    Take 1 capsule (20 mg total) by mouth daily for 20 days       Start Date: 8/18/2021 End Date: 9/7/2021       Order Dose: 20 mg       Quantity: 20 capsule    Refills: 0     No discharge procedures on file  PDMP Review       Value Time User    PDMP Reviewed  Yes 8/18/2021  1:04 AM Bill Kennedy MD           ED Provider  Attending physically available and evaluated Patyeneida Taqueria TELLO managed the patient along with the ED Attending      Electronically Signed by         Bhavani Duran MD  08/18/21 3051

## 2021-08-23 ENCOUNTER — OFFICE VISIT (OUTPATIENT)
Dept: FAMILY MEDICINE CLINIC | Facility: CLINIC | Age: 41
End: 2021-08-23
Payer: COMMERCIAL

## 2021-08-23 VITALS
BODY MASS INDEX: 18.65 KG/M2 | SYSTOLIC BLOOD PRESSURE: 100 MMHG | HEART RATE: 76 BPM | HEIGHT: 67 IN | TEMPERATURE: 97.8 F | OXYGEN SATURATION: 100 % | WEIGHT: 118.8 LBS | DIASTOLIC BLOOD PRESSURE: 60 MMHG

## 2021-08-23 DIAGNOSIS — N63.0 BREAST LUMP: ICD-10-CM

## 2021-08-23 DIAGNOSIS — R10.11 RIGHT UPPER QUADRANT ABDOMINAL PAIN: Primary | ICD-10-CM

## 2021-08-23 DIAGNOSIS — K59.09 CHRONIC CONSTIPATION: ICD-10-CM

## 2021-08-23 DIAGNOSIS — R09.81 NASAL CONGESTION: ICD-10-CM

## 2021-08-23 PROCEDURE — 1036F TOBACCO NON-USER: CPT | Performed by: FAMILY MEDICINE

## 2021-08-23 PROCEDURE — 99214 OFFICE O/P EST MOD 30 MIN: CPT | Performed by: FAMILY MEDICINE

## 2021-08-23 PROCEDURE — 3008F BODY MASS INDEX DOCD: CPT | Performed by: FAMILY MEDICINE

## 2021-08-23 RX ORDER — FLUTICASONE PROPIONATE 50 MCG
1 SPRAY, SUSPENSION (ML) NASAL DAILY
Qty: 48 G | Refills: 3 | Status: SHIPPED | OUTPATIENT
Start: 2021-08-23

## 2021-08-23 RX ORDER — POLYETHYLENE GLYCOL 3350 17 G/17G
17 POWDER, FOR SOLUTION ORAL DAILY
Start: 2021-08-23

## 2021-08-23 NOTE — PROGRESS NOTES
Chief Complaint   Patient presents with    Constipation     suhail is worried about parasite        HPI   Here for follow-up of abdominal pain  She was seen in the ER 5 days ago with right flank pain that she has had on and off for the last year  She was concerned about parasites because of a trip to [facility] a couple of months ago  She notes that she had a colon irrigation of 32 gal of water prior to her abdominal complaints  She also took an over-the-counter warm medication  She notes that she had itching in the rectal opening as did her  although it has been better  CT scan was done of her abdomen which was negative for gallstones  Unchanged from CT scan done on 05/16 for similar symptoms  Had been on omeprazole couple years ago for a little bit  More recently, using antacids  Which does not make a difference  Pain seems to be worse with fatty meals  Pain has been daily for the last couple of weeks  Started MiraLax yesterday  Past Medical History:   Diagnosis Date    Anxiety     Attention deficit hyperactivity disorder (ADHD), predominantly inattentive type 7/31/2015    Cystic fibrosis carrier 5/24/2021    Formatting of this note might be different from the original  Partner declined Testing    IBS (irritable bowel syndrome)     Raynaud's disease     Recurrent major depressive disorder, in full remission ([facility] [address] ) 11/5/2018    Restless leg syndrome 11/5/2018    UTI (urinary tract infection)     UTI group B Strep x 2 5 months ago        Past Surgical History:   Procedure Laterality Date    AUGMENTATION BREAST  2013    SHOULDER SURGERY Right     Two surgeries to repair recurrent dislocation       Social History     Tobacco Use    Smoking status: Former Smoker     Types: E-Cigarettes    Smokeless tobacco: Never Used   Substance Use Topics    Alcohol use: No       Social History     Social History Narrative    From 3/7/14:    Moved back from [place] after 8 years       in [address] to [place]  Together since 2013  Daughter, Kirby Garay, 3 as of 5/21  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  Tra Rivera owns a gym- [employer]  Became a certified holistic health   Enjoys hiking, yoga  The following portions of the patient's history were reviewed and updated as appropriate: allergies, current medications, past family history, past medical history, past social history, past surgical history and problem list       Review of Systems   Constitutional: Negative for activity change and appetite change  HENT: Negative for ear pain and hearing loss  Eyes: Negative for visual disturbance  Respiratory: Negative for shortness of breath and wheezing  Cardiovascular: Negative for chest pain and leg swelling  Gastrointestinal: Positive for constipation  Negative for abdominal pain and diarrhea  Genitourinary: Negative for difficulty urinating  Musculoskeletal: Negative for arthralgias and back pain  Skin: Negative for rash  Neurological: Negative for headaches  Psychiatric/Behavioral: Negative for dysphoric mood  The patient is not nervous/anxious  /60 (BP Location: Left arm, Patient Position: Sitting, Cuff Size: Adult)   Pulse 76   Temp 97 8 °F (36 6 °C) (Temporal)   Ht 5' 7" (1 702 m)   Wt 53 9 kg (118 lb 12 8 oz)   LMP 07/23/2021   SpO2 100%   BMI 18 61 kg/m²      Physical Exam   Appears well  Both eardrums are white  Nasal passages are patent  Mild maxillary sinus tenderness  No erythema in throat  No neck nodes or thyromegaly  Lungs are clear  Heart regular  Abdomen is soft with minimal upper quadrant tenderness  No guarding or rebound  Has some lumps on the inferior aspect of her left breast which have been present for a long time but she would like a mammogram   One also on the right breast which is new  She has bilateral saline implants          Current Outpatient Medications:     Calcium Carb-Cholecalciferol (CALCIUM 1000 + D) 1000-800 MG-UNIT TABS, daily , Disp: , Rfl:     Magnesium Citrate 100 MG TABS, Take 296 mL by mouth daily , Disp: , Rfl:     Multiple Vitamins-Minerals (MULTIVITAMIN ADULT PO), Take by mouth daily, Disp: , Rfl:     Omega-3 Fatty Acids (FISH OIL) 1,000 mg, Take 1,000 mg by mouth daily , Disp: , Rfl:     Turmeric 500 MG TABS, Take by mouth, Disp: , Rfl:     fluticasone (FLONASE) 50 mcg/act nasal spray, 1 spray into each nostril daily, Disp: 48 g, Rfl: 3    polyethylene glycol (GLYCOLAX) 17 GM/SCOOP powder, Take 17 g by mouth daily, Disp: , Rfl:      No problem-specific Assessment & Plan notes found for this encounter  Diagnoses and all orders for this visit:    Right upper quadrant abdominal pain  -     Giardia lamblia, EIA and Ova and Parasites Examination; Future  -     NM hepatobiliary w rx; Future    Chronic constipation  -     polyethylene glycol (GLYCOLAX) 17 GM/SCOOP powder; Take 17 g by mouth daily    Nasal congestion  -     fluticasone (FLONASE) 50 mcg/act nasal spray; 1 spray into each nostril daily    Breast lump  -     Mammo screening bilateral w 3d & cad; Future        Patient Instructions   Intermittent abdominal pain could be secondary to chronic constipation  However, will check stool for ova and parasites and also Giardia and obtained a nuclear scan to rule out a nonfunctioning gallbladder  Meanwhile, use MiraLax on a daily basis  For nasal congestion, suggest 2 sprays of Flonase each nostril once daily  Strongly suggest getting the COVID vaccine to protect her daughter  And herself  Mammogram   Follow-up in 1 month

## 2021-08-23 NOTE — PATIENT INSTRUCTIONS
Intermittent abdominal pain could be secondary to chronic constipation  However, will check stool for ova and parasites and also Giardia and obtained a nuclear scan to rule out a nonfunctioning gallbladder  Meanwhile, use MiraLax on a daily basis  For nasal congestion, suggest 2 sprays of Flonase each nostril once daily  Strongly suggest getting the COVID vaccine to protect her daughter  And herself  Mammogram   Follow-up in 1 month

## 2021-09-01 DIAGNOSIS — Z12.31 ENCOUNTER FOR SCREENING MAMMOGRAM FOR MALIGNANT NEOPLASM OF BREAST: Primary | ICD-10-CM

## 2021-09-01 DIAGNOSIS — N63.0 LUMP OR MASS IN BREAST: ICD-10-CM

## 2021-09-09 ENCOUNTER — APPOINTMENT (OUTPATIENT)
Dept: LAB | Facility: HOSPITAL | Age: 41
End: 2021-09-09
Payer: COMMERCIAL

## 2021-09-09 ENCOUNTER — TELEPHONE (OUTPATIENT)
Dept: GASTROENTEROLOGY | Facility: AMBULARY SURGERY CENTER | Age: 41
End: 2021-09-09

## 2021-09-09 ENCOUNTER — TELEPHONE (OUTPATIENT)
Dept: GASTROENTEROLOGY | Facility: CLINIC | Age: 41
End: 2021-09-09

## 2021-09-09 ENCOUNTER — TELEPHONE (OUTPATIENT)
Dept: FAMILY MEDICINE CLINIC | Facility: CLINIC | Age: 41
End: 2021-09-09

## 2021-09-09 NOTE — TELEPHONE ENCOUNTER
lmom for patient to call me to have her appt moved up   She can be placed on Dr Nancy Castañeda schedule

## 2021-09-09 NOTE — TELEPHONE ENCOUNTER
Gastroenterology called they saw the patient for evaluation of parasites  They are suggesting if you could place a referral in for infectious disease since the patient states that she has foreign bodies all over her body and they are even coming out of her ears  And they are not sure how to treat this   Please advise

## 2021-09-09 NOTE — TELEPHONE ENCOUNTER
Patients GI provider:  Dr PAYNE     Number to return call: [phone]     Reason for call: Natalia Brown from Dr Anner Carrel office called requesting for a sooner appt due to worm and blood in stool  Natalia Brown would like patient to be called       Scheduled procedure/appointment date if applicable: Apt/procedure 10/13/21

## 2021-09-14 ENCOUNTER — AMB VIDEO VISIT (OUTPATIENT)
Dept: OTHER | Facility: HOSPITAL | Age: 41
End: 2021-09-14

## 2021-09-14 PROCEDURE — ECARE PR SL URGENT CARE VIRTUAL VISIT: Performed by: FAMILY MEDICINE

## 2021-09-15 ENCOUNTER — OFFICE VISIT (OUTPATIENT)
Dept: FAMILY MEDICINE CLINIC | Facility: CLINIC | Age: 41
End: 2021-09-15
Payer: COMMERCIAL

## 2021-09-15 ENCOUNTER — LAB (OUTPATIENT)
Dept: LAB | Age: 41
End: 2021-09-15
Payer: COMMERCIAL

## 2021-09-15 ENCOUNTER — LAB (OUTPATIENT)
Dept: LAB | Facility: CLINIC | Age: 41
End: 2021-09-15
Payer: COMMERCIAL

## 2021-09-15 VITALS
WEIGHT: 121.8 LBS | RESPIRATION RATE: 16 BRPM | SYSTOLIC BLOOD PRESSURE: 122 MMHG | BODY MASS INDEX: 19.12 KG/M2 | HEIGHT: 67 IN | TEMPERATURE: 97.5 F | DIASTOLIC BLOOD PRESSURE: 74 MMHG | HEART RATE: 113 BPM

## 2021-09-15 DIAGNOSIS — Z71.1 CONCERN ABOUT DIGESTIVE DISEASE WITHOUT DIAGNOSIS: ICD-10-CM

## 2021-09-15 DIAGNOSIS — K59.09 CHRONIC CONSTIPATION: ICD-10-CM

## 2021-09-15 DIAGNOSIS — K59.09 CHRONIC CONSTIPATION: Primary | ICD-10-CM

## 2021-09-15 DIAGNOSIS — R10.11 RIGHT UPPER QUADRANT ABDOMINAL PAIN: ICD-10-CM

## 2021-09-15 DIAGNOSIS — R50.9 LOW GRADE FEVER: ICD-10-CM

## 2021-09-15 DIAGNOSIS — N64.52 BREAST DISCHARGE: ICD-10-CM

## 2021-09-15 LAB
ALBUMIN SERPL BCP-MCNC: 3.8 G/DL (ref 3.5–5)
ALP SERPL-CCNC: 45 U/L (ref 46–116)
ALT SERPL W P-5'-P-CCNC: 27 U/L (ref 12–78)
ANION GAP SERPL CALCULATED.3IONS-SCNC: 4 MMOL/L (ref 4–13)
AST SERPL W P-5'-P-CCNC: 14 U/L (ref 5–45)
BASOPHILS # BLD AUTO: 0.03 THOUSANDS/ΜL (ref 0–0.1)
BASOPHILS NFR BLD AUTO: 1 % (ref 0–1)
BILIRUB SERPL-MCNC: 0.63 MG/DL (ref 0.2–1)
BUN SERPL-MCNC: 9 MG/DL (ref 5–25)
CALCIUM SERPL-MCNC: 8.8 MG/DL (ref 8.3–10.1)
CHLORIDE SERPL-SCNC: 106 MMOL/L (ref 100–108)
CO2 SERPL-SCNC: 27 MMOL/L (ref 21–32)
CREAT SERPL-MCNC: 0.74 MG/DL (ref 0.6–1.3)
EOSINOPHIL # BLD AUTO: 0.06 THOUSAND/ΜL (ref 0–0.61)
EOSINOPHIL NFR BLD AUTO: 1 % (ref 0–6)
ERYTHROCYTE [DISTWIDTH] IN BLOOD BY AUTOMATED COUNT: 11.6 % (ref 11.6–15.1)
GFR SERPL CREATININE-BSD FRML MDRD: 102 ML/MIN/1.73SQ M
GLUCOSE SERPL-MCNC: 96 MG/DL (ref 65–140)
HCT VFR BLD AUTO: 42.7 % (ref 34.8–46.1)
HGB BLD-MCNC: 13.8 G/DL (ref 11.5–15.4)
IMM GRANULOCYTES # BLD AUTO: 0.02 THOUSAND/UL (ref 0–0.2)
IMM GRANULOCYTES NFR BLD AUTO: 0 % (ref 0–2)
LYMPHOCYTES # BLD AUTO: 0.99 THOUSANDS/ΜL (ref 0.6–4.47)
LYMPHOCYTES NFR BLD AUTO: 21 % (ref 14–44)
MCH RBC QN AUTO: 31.9 PG (ref 26.8–34.3)
MCHC RBC AUTO-ENTMCNC: 32.3 G/DL (ref 31.4–37.4)
MCV RBC AUTO: 99 FL (ref 82–98)
MONOCYTES # BLD AUTO: 0.43 THOUSAND/ΜL (ref 0.17–1.22)
MONOCYTES NFR BLD AUTO: 9 % (ref 4–12)
NEUTROPHILS # BLD AUTO: 3.23 THOUSANDS/ΜL (ref 1.85–7.62)
NEUTS SEG NFR BLD AUTO: 68 % (ref 43–75)
NRBC BLD AUTO-RTO: 0 /100 WBCS
PLATELET # BLD AUTO: 182 THOUSANDS/UL (ref 149–390)
PMV BLD AUTO: 11.1 FL (ref 8.9–12.7)
POTASSIUM SERPL-SCNC: 4.5 MMOL/L (ref 3.5–5.3)
PROLACTIN SERPL-MCNC: 10.5 NG/ML
PROT SERPL-MCNC: 6.9 G/DL (ref 6.4–8.2)
RBC # BLD AUTO: 4.32 MILLION/UL (ref 3.81–5.12)
SODIUM SERPL-SCNC: 137 MMOL/L (ref 136–145)
WBC # BLD AUTO: 4.76 THOUSAND/UL (ref 4.31–10.16)

## 2021-09-15 PROCEDURE — 85025 COMPLETE CBC W/AUTO DIFF WBC: CPT

## 2021-09-15 PROCEDURE — 36415 COLL VENOUS BLD VENIPUNCTURE: CPT

## 2021-09-15 PROCEDURE — 1036F TOBACCO NON-USER: CPT | Performed by: FAMILY MEDICINE

## 2021-09-15 PROCEDURE — 84146 ASSAY OF PROLACTIN: CPT

## 2021-09-15 PROCEDURE — 3008F BODY MASS INDEX DOCD: CPT | Performed by: FAMILY MEDICINE

## 2021-09-15 PROCEDURE — 80053 COMPREHEN METABOLIC PANEL: CPT

## 2021-09-15 PROCEDURE — 99214 OFFICE O/P EST MOD 30 MIN: CPT | Performed by: FAMILY MEDICINE

## 2021-09-15 NOTE — PROGRESS NOTES
Chief Complaint   Patient presents with    Fever        HPI    Here because she is concerned about a parasite  Feels that things are coming out of her ears and her eyes  Also things on her mouth  She was seen by ENT  Something was sent for pathology but it showed no tissue  She was unable to take a stool specimen to [facility] because [facility]  She does have a stool specimen pending through [facility]   From 3 days ago  Also having low-grade fever up to 99  Says she usually runs about 96  Notes of foul odor on the right side of her neck  Funny taste in her mouth  Continues on MiraLax on a regular basis  Bowels are ribbon like  Continues with right upper quadrant pain  HIDA scan was delayed because of lack of isotope  Past Medical History:   Diagnosis Date    Anxiety     Attention deficit hyperactivity disorder (ADHD), predominantly inattentive type 7/31/2015    Cystic fibrosis carrier 5/24/2021    Formatting of this note might be different from the original  Partner declined Testing    IBS (irritable bowel syndrome)     Raynaud's disease     Recurrent major depressive disorder, in full remission ([facility] [address] ) 11/5/2018    Restless leg syndrome 11/5/2018    UTI (urinary tract infection)     UTI group B Strep x 2 5 months ago        Past Surgical History:   Procedure Laterality Date    AUGMENTATION BREAST  2013    SHOULDER SURGERY Right     Two surgeries to repair recurrent dislocation    WISDOM TOOTH EXTRACTION         Social History     Tobacco Use    Smoking status: Former Smoker     Types: E-Cigarettes    Smokeless tobacco: Never Used   Substance Use Topics    Alcohol use: No       Social History     Social History Narrative    From 3/7/14:    Moved back from [place] after 8 years   in [address] to [place]  Together since 2013  Daughter, Mady Nephew, 3 as of 5/21  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[employer]  Became a certified holistic health   Enjoys hiking, yoga  The following portions of the patient's history were reviewed and updated as appropriate: allergies, current medications, past family history, past medical history, past social history, past surgical history and problem list       Review of Systems   Constitutional: Negative for activity change and appetite change  HENT: Negative for ear pain and hearing loss  Eyes: Negative for visual disturbance  Respiratory: Negative for shortness of breath and wheezing  Cardiovascular: Negative for chest pain and leg swelling  Gastrointestinal: Negative for abdominal pain, constipation and diarrhea  Genitourinary: Negative for difficulty urinating  Musculoskeletal: Negative for arthralgias and back pain  Skin: Negative for rash  Neurological: Negative for headaches  Psychiatric/Behavioral: Negative for dysphoric mood  The patient is not nervous/anxious  /74 (BP Location: Left arm, Patient Position: Sitting, Cuff Size: Standard)   Pulse (!) 113   Temp 97 5 °F (36 4 °C)   Resp 16   Ht 5' 7" (1 702 m)   Wt 55 2 kg (121 lb 12 8 oz)   BMI 19 08 kg/m²      Physical Exam     Appears well  Eye exam is unremarkable  Ear canals are normal except for small bruise in the right ear canal     Throat reveals normal sized tonsils  No exudate or erythema  There are some shotty lymph nodes in the right cervical chain  Lungs are clear  Heart regular  Breasts revealed no mass on the right side  She can express a small amount of whitish fluid from the right breast     Abdomen soft and nontender  Mood somewhat anxious                Current Outpatient Medications:     Calcium Carb-Cholecalciferol (CALCIUM 1000 + D) 1000-800 MG-UNIT TABS, daily , Disp: , Rfl:     fluticasone (FLONASE) 50 mcg/act nasal spray, 1 spray into each nostril daily, Disp: 48 g, Rfl: 3    Magnesium Citrate 100 MG TABS, Take 296 mL by mouth daily , Disp: , Rfl:     Multiple Vitamins-Minerals (MULTIVITAMIN ADULT PO), Take by mouth daily, Disp: , Rfl:     Omega-3 Fatty Acids (FISH OIL) 1,000 mg, Take 1,000 mg by mouth daily , Disp: , Rfl:     polyethylene glycol (GLYCOLAX) 17 GM/SCOOP powder, Take 17 g by mouth daily, Disp: , Rfl:     Turmeric 500 MG TABS, Take by mouth, Disp: , Rfl:      No problem-specific Assessment & Plan notes found for this encounter  Diagnoses and all orders for this visit:    Chronic constipation  -     Ova and parasite examination; Future  -     Ambulatory referral to Gastroenterology; Future    Right upper quadrant abdominal pain  -     CBC and differential; Future  -     Comprehensive metabolic panel; Future  -     Ambulatory referral to Gastroenterology; Future    Concern about digestive disease without diagnosis    Low grade fever    Breast discharge  -     Prolactin; Future        Patient Instructions    No obvious sign of a parasitic infection  Will check blood count and chemistry  Will also check prolactin level  Scheduled to have a mammogram     Stool specimen was sent out by [facility]   Will also get 1 through [facility]  Referral to GI for further evaluation for both ribbon like stool and right upper quadrant pain  Anticipates having her COVID vaccine in the next couple weeks through work  Recheck in 1 month

## 2021-09-15 NOTE — LETTER
September 15, 2021     Patient: Dara Shay   YOB: 1980   Date of Visit: 9/15/2021       To Whom it May Concern:    Dara Shay is under my professional care  She was seen in my office on 9/15/2021  She may return to work on 9/20/21       If you have any questions or concerns, please don't hesitate to call           Sincerely,          Florentin Suresh MD        CC: No Recipients

## 2021-09-15 NOTE — CARE ANYWHERE EVISITS
Visit Summary for Amy Miguel - Gender: Female - Date of Birth: [id number]  Date: [id number] - Duration: 10 minutes  Patient: Amy Miguel  Provider: Holly Pruitt    Patient Contact Information  Address  [place] [address] [place] [address]; Reyn[address]  [phone]    Visit Topics  I was in [facility] in [place] was in [facility] in June and I believe I contracted a parasite  Specifically a worm  Iâve been finding them in my ear is my nose and Iâve been coughing up eggs at night and in the morning  I tried to go to urgent care last   night and they sent me to the ER  I waited in the ER lobby for four hours before giving up and driving home to get some rest  I wanted to discuss some options with you and if you think my symptoms warrants a trip to the ER  [Added By: Self - 2021-09-14]    Triage Questions   What is your current physical address in the event of a medical emergency? Answer []  Are you allergic to any medications? Answer []  Are you now or could you be pregnant? Answer []  Do you have any immune system compromise or chronic lung   disease? Answer []  Do you have any vulnerable family members in the home (infant, pregnant, cancer, elderly)? Answer []     Conversation Transcripts  [0A][0A] [Notification] You are connected with Holly Pruitt Family Physician [0A][Notification] Homer Dobbs is located in [place]  [0A][Notification] Homer Dobbs has shared health history  Sharita Cohen  [0A]    Diagnosis  Unspecified parasitic disease    Procedures  Value: [id number] Code: CPT-4 UNLISTED E&M SERVICE    Medications Prescribed    No prescriptions ordered    Provider Notes  [0A][0A] [0A]We strongly encourage you to share the following record of today's visit with your primary care physician  [0A][0A][0A][0A]Contact phone number: see pt summary[0A][0A][0A][0A]Mode of Communication: video[0A]CC: I was in [place] in [place] I was in   [place] in June and I believe I contracted a parasite  Specifically a worm   Iâve been finding them in my ear is my nose and Iâve been coughing up eggs at night and in the morning  I tried to go to urgent care last night and they sent me to the ER  I   waited in the ER lobby for four hours before giving up and driving home to get some rest  I wanted to discuss some options with you and if you think my symptoms warrants a trip to the ER  [0A][0A][0A][0A]HPI: Pt is a 46 y/o female who states that she may   have picked up a parasite while in [facility] in June 2021  C/o right sided abd pain, had US in ER - normal  Saw pcp due to rectal itching and constipation  Stool specimen sent 2 weeks ago, wrong lab so no results  C/o worms from the ears, sensation of worms   moving in breast implant; putrid smell when massages her right lymphatics  Saw ENT for worms from the ears, specimen brought to provider - possible hookwork but Genus and species pending confirmation  Went to ER yesterday but had to wait for 4 hrs,   decided to leave  Would like an anti-helmintic prescribed  [0A][0A]PMH: otherwise healthy[0A][0A]PSH: none[0A][0A]Meds: none[0A][0A]Allergies: nkda[0A][0A][0A][0A]Exam: [0A][0A]Gen: Alert, normal mental status and interaction, no visible distress, non-   toxic appearance  [0A]HEENT: No rash, eomi, no conjunctival injection, non congested  [0A][0A][0A][0A]Assessment: Concerns about parasitic infection[0A][0A][0A]Medical decision making: Pt advised to seek in person evaluation and/or f/u with the providers   she has seen in person for results of specimens sent to labs and treatment as indicated  Pt voiced understanding  [0A][0A][0A][0A]Follow up:[0A]Please print a copy of this note and send it to your regular doctor, or take it to your next visit so it may   be included in your medical record  [0A][0A][0A][0A]Patient voiced understanding and agrees to plan [0A][0A][0A][0A]Please see your PCP on an annual basis  [0A]    Electronically signed by:  Bárbara York(NPI [id number])

## 2021-09-15 NOTE — PATIENT INSTRUCTIONS
No obvious sign of a parasitic infection  Will check blood count and chemistry  Will also check prolactin level  Scheduled to have a mammogram     Stool specimen was sent out by [facility]   Will also get 1 through [facility]  Referral to GI for further evaluation for both ribbon like stool and right upper quadrant pain  Anticipates having her COVID vaccine in the next couple weeks through work  Recheck in 1 month

## 2021-09-16 ENCOUNTER — APPOINTMENT (OUTPATIENT)
Dept: LAB | Facility: CLINIC | Age: 41
End: 2021-09-16
Payer: COMMERCIAL

## 2021-09-16 PROCEDURE — 87209 SMEAR COMPLEX STAIN: CPT

## 2021-09-16 PROCEDURE — 87177 OVA AND PARASITES SMEARS: CPT

## 2021-09-17 ENCOUNTER — HOSPITAL ENCOUNTER (OUTPATIENT)
Dept: ULTRASOUND IMAGING | Facility: CLINIC | Age: 41
Discharge: HOME | End: 2021-09-17
Payer: COMMERCIAL

## 2021-09-17 ENCOUNTER — HOSPITAL ENCOUNTER (OUTPATIENT)
Dept: MAMMOGRAPHY | Facility: CLINIC | Age: 41
Discharge: HOME | End: 2021-09-17
Payer: COMMERCIAL

## 2021-09-17 VITALS — BODY MASS INDEX: 18.99 KG/M2 | HEIGHT: 67 IN | WEIGHT: 121 LBS

## 2021-09-17 DIAGNOSIS — N63.0 LUMP OR MASS IN BREAST: ICD-10-CM

## 2021-09-17 DIAGNOSIS — N63.0 BREAST LUMP: ICD-10-CM

## 2021-09-17 DIAGNOSIS — Z12.31 ENCOUNTER FOR SCREENING MAMMOGRAM FOR MALIGNANT NEOPLASM OF BREAST: ICD-10-CM

## 2021-09-17 PROCEDURE — 76642 ULTRASOUND BREAST LIMITED: CPT

## 2021-09-17 PROCEDURE — G0279 TOMOSYNTHESIS, MAMMO: HCPCS

## 2021-09-17 PROCEDURE — 77066 DX MAMMO INCL CAD BI: CPT

## 2021-09-20 LAB — O+P STL CONC: NORMAL

## 2021-10-01 ENCOUNTER — OFFICE VISIT (OUTPATIENT)
Dept: FAMILY MEDICINE CLINIC | Facility: CLINIC | Age: 41
End: 2021-10-01
Payer: COMMERCIAL

## 2021-10-01 VITALS
HEART RATE: 64 BPM | SYSTOLIC BLOOD PRESSURE: 92 MMHG | TEMPERATURE: 97.4 F | WEIGHT: 120 LBS | HEIGHT: 67 IN | BODY MASS INDEX: 18.83 KG/M2 | OXYGEN SATURATION: 100 % | DIASTOLIC BLOOD PRESSURE: 64 MMHG

## 2021-10-01 DIAGNOSIS — F33.1 MODERATE EPISODE OF RECURRENT MAJOR DEPRESSIVE DISORDER (HCC): Primary | ICD-10-CM

## 2021-10-01 DIAGNOSIS — F41.0 PANIC ATTACKS: ICD-10-CM

## 2021-10-01 DIAGNOSIS — F41.9 ANXIETY: ICD-10-CM

## 2021-10-01 PROCEDURE — 99214 OFFICE O/P EST MOD 30 MIN: CPT | Performed by: FAMILY MEDICINE

## 2021-10-01 PROCEDURE — 3008F BODY MASS INDEX DOCD: CPT | Performed by: FAMILY MEDICINE

## 2021-10-01 PROCEDURE — 1036F TOBACCO NON-USER: CPT | Performed by: FAMILY MEDICINE

## 2021-10-01 RX ORDER — CLONAZEPAM 0.5 MG/1
0.5 TABLET ORAL 2 TIMES DAILY PRN
Qty: 60 TABLET | Refills: 0 | Status: SHIPPED | OUTPATIENT
Start: 2021-10-01 | End: 2021-10-29 | Stop reason: SDUPTHER

## 2021-10-01 RX ORDER — ESCITALOPRAM OXALATE 10 MG/1
10 TABLET ORAL DAILY
Qty: 30 TABLET | Refills: 5 | Status: SHIPPED | OUTPATIENT
Start: 2021-10-01 | End: 2021-10-29 | Stop reason: SDUPTHER

## 2022-03-07 DIAGNOSIS — F41.9 ANXIETY: ICD-10-CM

## 2022-03-08 RX ORDER — CLONAZEPAM 0.5 MG/1
0.5 TABLET ORAL 2 TIMES DAILY PRN
Qty: 60 TABLET | Refills: 1 | OUTPATIENT
Start: 2022-03-08

## 2022-03-08 NOTE — TELEPHONE ENCOUNTER
Refill for clonazepam denied    She has gotten it from another provider and not seen by me since October 1st

## 2022-03-22 ENCOUNTER — NURSE TRIAGE (OUTPATIENT)
Dept: OTHER | Facility: OTHER | Age: 42
End: 2022-03-22

## 2022-04-18 ENCOUNTER — TELEPHONE (OUTPATIENT)
Dept: FAMILY MEDICINE CLINIC | Facility: CLINIC | Age: 42
End: 2022-04-18

## 2022-04-18 NOTE — TELEPHONE ENCOUNTER
Patient is requesting rx for gabapentin  We did not write last rx for this medicine and it was last written in 2019  She has upcoming appt in May

## 2022-05-02 ENCOUNTER — HOSPITAL ENCOUNTER (EMERGENCY)
Facility: HOSPITAL | Age: 42
End: 2022-05-04
Attending: EMERGENCY MEDICINE
Payer: COMMERCIAL

## 2022-05-02 DIAGNOSIS — B86 SCABIES: ICD-10-CM

## 2022-05-02 DIAGNOSIS — R45.851 SUICIDAL IDEATION: Primary | ICD-10-CM

## 2022-05-02 LAB
AMPHETAMINES SERPL QL SCN: POSITIVE
BARBITURATES UR QL: NEGATIVE
BENZODIAZ UR QL: NEGATIVE
COCAINE UR QL: NEGATIVE
ETHANOL EXG-MCNC: 0 MG/DL
EXT PREG TEST URINE: NEGATIVE
EXT. CONTROL ED NAV: NORMAL
FLUAV RNA RESP QL NAA+PROBE: NEGATIVE
FLUBV RNA RESP QL NAA+PROBE: NEGATIVE
METHADONE UR QL: NEGATIVE
OPIATES UR QL SCN: NEGATIVE
OXYCODONE+OXYMORPHONE UR QL SCN: NEGATIVE
PCP UR QL: NEGATIVE
RSV RNA RESP QL NAA+PROBE: NEGATIVE
SARS-COV-2 RNA RESP QL NAA+PROBE: NEGATIVE
THC UR QL: POSITIVE

## 2022-05-02 PROCEDURE — 0241U HB NFCT DS VIR RESP RNA 4 TRGT: CPT | Performed by: EMERGENCY MEDICINE

## 2022-05-02 PROCEDURE — 80307 DRUG TEST PRSMV CHEM ANLYZR: CPT | Performed by: EMERGENCY MEDICINE

## 2022-05-02 PROCEDURE — 81025 URINE PREGNANCY TEST: CPT | Performed by: EMERGENCY MEDICINE

## 2022-05-02 PROCEDURE — 82075 ASSAY OF BREATH ETHANOL: CPT | Performed by: EMERGENCY MEDICINE

## 2022-05-02 PROCEDURE — 99285 EMERGENCY DEPT VISIT HI MDM: CPT

## 2022-05-02 PROCEDURE — 99285 EMERGENCY DEPT VISIT HI MDM: CPT | Performed by: EMERGENCY MEDICINE

## 2022-05-02 RX ORDER — NICOTINE 21 MG/24HR
14 PATCH, TRANSDERMAL 24 HOURS TRANSDERMAL ONCE
Status: DISCONTINUED | OUTPATIENT
Start: 2022-05-03 | End: 2022-05-02

## 2022-05-02 RX ORDER — LANOLIN ALCOHOL/MO/W.PET/CERES
3 CREAM (GRAM) TOPICAL
Status: DISCONTINUED | OUTPATIENT
Start: 2022-05-03 | End: 2022-05-04 | Stop reason: HOSPADM

## 2022-05-02 RX ORDER — NICOTINE 21 MG/24HR
14 PATCH, TRANSDERMAL 24 HOURS TRANSDERMAL DAILY
Status: DISCONTINUED | OUTPATIENT
Start: 2022-05-03 | End: 2022-05-04 | Stop reason: HOSPADM

## 2022-05-02 RX ORDER — PERMETHRIN 50 MG/G
CREAM TOPICAL ONCE
Status: COMPLETED | OUTPATIENT
Start: 2022-05-02 | End: 2022-05-02

## 2022-05-02 RX ADMIN — PERMETHRIN: 50 CREAM TOPICAL at 22:27

## 2022-05-02 NOTE — ED ATTENDING ATTESTATION
5/2/2022  I, Herbert Morel MD, saw and evaluated the patient  I have discussed the patient with the resident/non-physician practitioner and agree with the resident's/non-physician practitioner's findings, Plan of Care, and MDM as documented in the resident's/non-physician practitioner's note, except where noted  All available labs and Radiology studies were reviewed  I was present for key portions of any procedure(s) performed by the resident/non-physician practitioner and I was immediately available to provide assistance  At this point I agree with the current assessment done in the Emergency Department  I have conducted an independent evaluation of this patient a history and physical is as follows:    OA: 38 y/o f with psychiatric history who presents with depression and SI  Pt wrote a suicidal note in December and then again made comments to her mother and friend, who is at bedside, about wanting "not to be here anymore " Will not give specific plan here in the ED  + access to medications, denies acces to weapons  PT denies drug use  Admits to recent stressors including argument with her  who left their home last night  Mother has already filled out a 302  PE, distressed, crying and anxious, VSS, NC/AT, MMM, clear sclera/conjunctiva, + diffuse areas of excoriations/bites, blanching and nonttp, RR, lungs CTAB, abd soft, +BS, MÉNDEZ, anxious, labile mood, depressed, SI  A/p depression with vague SI  302 filled out by family   Crisis eval      ED Course         Critical Care Time  Procedures

## 2022-05-03 VITALS
TEMPERATURE: 97.8 F | HEART RATE: 60 BPM | SYSTOLIC BLOOD PRESSURE: 96 MMHG | RESPIRATION RATE: 17 BRPM | DIASTOLIC BLOOD PRESSURE: 51 MMHG | OXYGEN SATURATION: 100 %

## 2022-05-03 RX ORDER — OLANZAPINE 10 MG/1
10 INJECTION, POWDER, LYOPHILIZED, FOR SOLUTION INTRAMUSCULAR
Status: DISCONTINUED | OUTPATIENT
Start: 2022-05-03 | End: 2022-05-04 | Stop reason: HOSPADM

## 2022-05-03 RX ORDER — OLANZAPINE 10 MG/1
10 TABLET ORAL
Status: CANCELLED | OUTPATIENT
Start: 2022-05-03

## 2022-05-03 RX ORDER — TRAZODONE HYDROCHLORIDE 100 MG/1
100 TABLET ORAL
Status: CANCELLED | OUTPATIENT
Start: 2022-05-03

## 2022-05-03 RX ORDER — HYDROXYZINE HYDROCHLORIDE 25 MG/1
50 TABLET, FILM COATED ORAL
Status: CANCELLED | OUTPATIENT
Start: 2022-05-03

## 2022-05-03 RX ORDER — CLONAZEPAM 0.5 MG/1
0.5 TABLET ORAL 2 TIMES DAILY PRN
Status: DISCONTINUED | OUTPATIENT
Start: 2022-05-03 | End: 2022-05-04 | Stop reason: HOSPADM

## 2022-05-03 RX ORDER — LORAZEPAM 2 MG/ML
1 INJECTION INTRAMUSCULAR EVERY 4 HOURS PRN
Status: CANCELLED | OUTPATIENT
Start: 2022-05-03

## 2022-05-03 RX ORDER — LORAZEPAM 1 MG/1
1 TABLET ORAL EVERY 6 HOURS PRN
Status: CANCELLED | OUTPATIENT
Start: 2022-05-03

## 2022-05-03 RX ORDER — OLANZAPINE 5 MG/1
5 TABLET ORAL
Status: CANCELLED | OUTPATIENT
Start: 2022-05-03

## 2022-05-03 RX ORDER — LORAZEPAM 1 MG/1
1 TABLET ORAL ONCE
Status: DISCONTINUED | OUTPATIENT
Start: 2022-05-03 | End: 2022-05-04 | Stop reason: HOSPADM

## 2022-05-03 RX ORDER — LANOLIN ALCOHOL/MO/W.PET/CERES
3 CREAM (GRAM) TOPICAL
Status: CANCELLED | OUTPATIENT
Start: 2022-05-03

## 2022-05-03 RX ORDER — HYDROXYZINE HYDROCHLORIDE 25 MG/1
25 TABLET, FILM COATED ORAL
Status: CANCELLED | OUTPATIENT
Start: 2022-05-03

## 2022-05-03 RX ORDER — FLUOXETINE 10 MG/1
10 CAPSULE ORAL DAILY
Status: DISCONTINUED | OUTPATIENT
Start: 2022-05-04 | End: 2022-05-04 | Stop reason: HOSPADM

## 2022-05-03 RX ORDER — OLANZAPINE 10 MG/1
10 TABLET, ORALLY DISINTEGRATING ORAL ONCE
Status: DISCONTINUED | OUTPATIENT
Start: 2022-05-03 | End: 2022-05-03

## 2022-05-03 RX ORDER — BENZTROPINE MESYLATE 1 MG/1
1 TABLET ORAL EVERY 6 HOURS PRN
Status: CANCELLED | OUTPATIENT
Start: 2022-05-03

## 2022-05-03 RX ORDER — ACETAMINOPHEN 325 MG/1
975 TABLET ORAL EVERY 6 HOURS PRN
Status: CANCELLED | OUTPATIENT
Start: 2022-05-03

## 2022-05-03 RX ORDER — LORAZEPAM 1 MG/1
1 TABLET ORAL ONCE
Status: DISCONTINUED | OUTPATIENT
Start: 2022-05-03 | End: 2022-05-03

## 2022-05-03 RX ORDER — OLANZAPINE 10 MG/1
10 INJECTION, POWDER, LYOPHILIZED, FOR SOLUTION INTRAMUSCULAR
Status: CANCELLED | OUTPATIENT
Start: 2022-05-03

## 2022-05-03 RX ORDER — LORAZEPAM 2 MG/ML
2 INJECTION INTRAMUSCULAR
Status: CANCELLED | OUTPATIENT
Start: 2022-05-03

## 2022-05-03 RX ORDER — OLANZAPINE 10 MG/1
5 INJECTION, POWDER, LYOPHILIZED, FOR SOLUTION INTRAMUSCULAR
Status: CANCELLED | OUTPATIENT
Start: 2022-05-03

## 2022-05-03 RX ORDER — POLYETHYLENE GLYCOL 3350 17 G/17G
17 POWDER, FOR SOLUTION ORAL DAILY PRN
Status: CANCELLED | OUTPATIENT
Start: 2022-05-03

## 2022-05-03 RX ORDER — OLANZAPINE 10 MG/1
10 INJECTION, POWDER, LYOPHILIZED, FOR SOLUTION INTRAMUSCULAR ONCE
Status: DISCONTINUED | OUTPATIENT
Start: 2022-05-03 | End: 2022-05-03

## 2022-05-03 RX ORDER — ACETAMINOPHEN 325 MG/1
650 TABLET ORAL EVERY 6 HOURS PRN
Status: CANCELLED | OUTPATIENT
Start: 2022-05-03

## 2022-05-03 RX ORDER — MAGNESIUM HYDROXIDE/ALUMINUM HYDROXICE/SIMETHICONE 120; 1200; 1200 MG/30ML; MG/30ML; MG/30ML
30 SUSPENSION ORAL EVERY 4 HOURS PRN
Status: CANCELLED | OUTPATIENT
Start: 2022-05-03

## 2022-05-03 RX ORDER — OLANZAPINE 10 MG/1
10 TABLET, ORALLY DISINTEGRATING ORAL AS NEEDED
Status: DISCONTINUED | OUTPATIENT
Start: 2022-05-03 | End: 2022-05-04 | Stop reason: HOSPADM

## 2022-05-03 RX ORDER — ACETAMINOPHEN 325 MG/1
650 TABLET ORAL EVERY 4 HOURS PRN
Status: CANCELLED | OUTPATIENT
Start: 2022-05-03

## 2022-05-03 RX ORDER — OLANZAPINE 2.5 MG/1
2.5 TABLET ORAL
Status: CANCELLED | OUTPATIENT
Start: 2022-05-03

## 2022-05-03 RX ORDER — ATOMOXETINE 40 MG/1
40 CAPSULE ORAL DAILY
Status: DISCONTINUED | OUTPATIENT
Start: 2022-05-04 | End: 2022-05-04 | Stop reason: HOSPADM

## 2022-05-03 RX ADMIN — CLONAZEPAM 0.5 MG: 0.5 TABLET ORAL at 17:08

## 2022-05-03 RX ADMIN — FLUOXETINE 10 MG: 10 CAPSULE ORAL at 17:08

## 2022-05-03 RX ADMIN — NICOTINE 14 MG: 14 PATCH, EXTENDED RELEASE TRANSDERMAL at 18:08

## 2022-05-03 NOTE — ED NOTES
Pt requesting to speak with crisis worker at this time  1:1 ongoing        330 Juan Luis Benitez, RN  05/03/22 1602

## 2022-05-03 NOTE — ED NOTES
Per EVS, patient is inactive with MA coverage  Patient has insurance card filed in [facility] under last name: Myles Mortensenrini, however, per Yogesh the coverage is inactive       Krystal Ferrell, hospitals  05/03/22   150

## 2022-05-03 NOTE — ED NOTES
Crisis and security at bedside to discuss 36 that was being filed  Crisis offered to read pt her rights, and pt began to yell and scream about the 302 being filed against her  Pt began to be destructive to furniture in the room  Pt ripped the mattress off of her bed and ripped off objects connected to the computer  Pt continued to scream and more security was brought to bedside along with this RN and Dr Rishi Robles  Pt began to calm down on her own and deescalate while speaking with crisis and Dr Rishi Robles  Pt moved to new room closer to nurses station once she was calm down enough        Tish Leyva  05/03/22 0122       Lucrecia Leyva  05/03/22 0401

## 2022-05-03 NOTE — ED NOTES
Pt requesting to speak to her mother at this time pt stated "can I have the nicotine patch before I fuckin lose my mind"        Luis Alberto Klein  05/02/22 8827

## 2022-05-03 NOTE — ED NOTES
Pt given regular home medications at this time  Scheduled to resume home schedule tomorrow morning        330 Juan Luis Benitez, RN  05/03/22 7976

## 2022-05-03 NOTE — ED NOTES
Pt has no complaints at this time, requesting update  1:1 ongoing        330 Juan Luis Benitez, RN  05/03/22 1585

## 2022-05-03 NOTE — ED NOTES
[address]  [facility] arrived and delivered the upheld 302  Patient informed and rights were attempted to be read  Patient became enraged and destroyed property in room while crying and screaming due to worries in her life  Writer spoke with patient with Dr Sylvester Ormond in room  Writer instructed that she will have a chance to speak her story and that it is in her best interest to continue to show she is handling things well while being allowed to be upset in a non-destructive manner  Informed patient writer is more than happy to sit with her and discuss what is happening but she has to work with Mary Henderson  Patient eventually calmed down  No restraints or medications were used  Patient's rights are in chart       [address] faxed  ACT 77 faxed    Cleo Tuckre  ED Crisis Intervention

## 2022-05-03 NOTE — ED CARE HANDOFF
Emergency Department Sign Out Note        Sign out and transfer of care from [facility]  See Separate Emergency Department note  The patient, Nadir Chandra, was evaluated by the previous provider for psychiatric evaluation  ED Course / Workup Pending (followup): The pt is medically cleared for inpatient behavioral health treatment  Disposition  Final diagnoses:   Suicidal ideation     Time reflects when diagnosis was documented in both MDM as applicable and the Disposition within this note     Time User Action Codes Description Comment    5/2/2022 11:51 PM Bernard Khalil Add [E02 250] Suicidal ideation       ED Disposition     ED Disposition Condition Date/Time Comment    Transfer to Another Facility-In Network  Mon May 2, 2022 10:55 PM         MD Documentation      Most Recent Value   Sending MD Dr Narvaze Agdaagux    None       Patient's Medications    No medications on file     No discharge procedures on file         ED Provider  Electronically Signed by     Grzegorz Hutson MD  05/03/22 0254

## 2022-05-03 NOTE — ED NOTES
Patient is accepted at [facility]  Patient is accepted by NP HOSP DR NAHUM DODD per Lady    Waiting on transport time  Patient may go to the floor after [phone]     Nurse report is to be called to [phone] prior to patient transfer

## 2022-05-03 NOTE — ED NOTES
Patient sleeping; in NAD; 1:1 continued  VS deferred until patient is awake    Nataly Calderon RN  05/03/22 3085       Nataly Calderon RN  05/03/22 4976

## 2022-05-03 NOTE — ED NOTES
Pt currently crying "this place is like retirement, I just want to go home, I didn't even do anything "     Myla Vicente  05/02/22 2791

## 2022-05-03 NOTE — ED NOTES
ED crisis worker at bedside providing patient update  1:1 ongoing  Pt has no complaints at this time        916 Juan Luis Benitez, RN  05/03/22 8627

## 2022-05-03 NOTE — ED NOTES
Pt sleeping at this time, tray provided  1:1 ongoing        330 Juan Luis Benitez, RN  05/03/22 0617

## 2022-05-03 NOTE — ED NOTES
Met with patient's mother, Mayelin Chavis, and her close friend, Tracieterrence Nugentisabell, for additional information  Mayelin Chavis states that patient had initially been doing well after her inpatient admission, however, over the last few weeks they have noticed some mood and behavior changes  Mayelin Chavis has noticed patient falling back into a depression and having paranoid thoughts of bugs in the house  Both Mayelin Chavis and Tracie Bledsoe feel that patient has become preoccupied with her health and often perseverates on different ailments  They have been concerned with patient not eating or taking her medications regularly  Mayelin Chavis adds that patient will not take medications that may add some weight consistently, which then impacts her moods  Yesterday, they were both in contact with patient after the argument with her   Today, Tracie Bledsoe, was checking on patient and patient asked her about a hotel room and made statements about not wanting to live like this  Tracie Bledsoe had become concerned because in December, patient's suicide plan involved going to a hotel to overdose  Mayelin Chavis knows patient is prescribed Klonopin, but is unsure where she keeps that and her other medications that she hasn't been taking  This afternoon, Jena asked Mayelin Chavis to go check on patient after she stopped answering her  Mayelin Chavis states that she got to the house and found patient in the passenger seat of her car curled up and crying  At that time, patient expressed feeling hopeless, having nothing to live for, and not caring about how anyone else would feel if she was gone  Mayelin Chavis called EMS for patient to be brought to the hospital  Hannah Corral and Tracie Bledsoe are concerned for patient's safety and feel that she needs to get treatment  Mayelin Chavis is willing to petition a 36  [facility] was called, spoke with Idalia Jolley, to request 36 petition   Idalia Jolley spoke with patient and attempted to offer her to sign a 201 again, however, patient just became more verbally escalated and yelled that she would not sign in and wants to get a restraining order against her mother  The phone call ended when patient threw the phone at the wall  Lisa expressed she would be on her way to meet with patient's mother       KATIA Christian  05/02/22   [id number]
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
## 2022-05-03 NOTE — ED NOTES
Pt sleeping comfortably at this time, patient remains on 1:1 observation        330 Juan Luis Benitez RN  05/03/22 0213
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
## 2022-05-03 NOTE — ED NOTES
Patient sleeping at this time  1:1 at bedside, ongoing        330 Juan Luis Benitez, RN  05/03/22 6170

## 2022-05-04 ENCOUNTER — HOSPITAL ENCOUNTER (INPATIENT)
Facility: HOSPITAL | Age: 42
LOS: 7 days | Discharge: HOME | DRG: 885 | End: 2022-05-11
Attending: HOSPITALIST | Admitting: PSYCHIATRY & NEUROLOGY
Payer: COMMERCIAL

## 2022-05-04 DIAGNOSIS — F41.9 ANXIETY: ICD-10-CM

## 2022-05-04 DIAGNOSIS — Z72.0 TOBACCO USE: ICD-10-CM

## 2022-05-04 DIAGNOSIS — F33.2 SEVERE EPISODE OF RECURRENT MAJOR DEPRESSIVE DISORDER (HCC): Primary | ICD-10-CM

## 2022-05-04 DIAGNOSIS — B86 SCABIES: ICD-10-CM

## 2022-05-04 DIAGNOSIS — A69.20 LYME DISEASE: ICD-10-CM

## 2022-05-04 PROBLEM — F19.90 SUBSTANCE USE: Status: ACTIVE | Noted: 2022-05-04

## 2022-05-04 PROCEDURE — 93005 ELECTROCARDIOGRAM TRACING: CPT

## 2022-05-04 PROCEDURE — 99221 1ST HOSP IP/OBS SF/LOW 40: CPT | Performed by: PSYCHIATRY & NEUROLOGY

## 2022-05-04 RX ORDER — GINSENG 100 MG
1 CAPSULE ORAL 2 TIMES DAILY
Status: DISCONTINUED | OUTPATIENT
Start: 2022-05-04 | End: 2022-05-11

## 2022-05-04 RX ORDER — LAMOTRIGINE 25 MG/1
25 TABLET ORAL
Status: DISCONTINUED | OUTPATIENT
Start: 2022-05-04 | End: 2022-05-11 | Stop reason: HOSPADM

## 2022-05-04 RX ORDER — PERMETHRIN 50 MG/G
CREAM TOPICAL ONCE
Status: COMPLETED | OUTPATIENT
Start: 2022-05-08 | End: 2022-05-08

## 2022-05-04 RX ORDER — HYDROXYZINE HYDROCHLORIDE 25 MG/1
25 TABLET, FILM COATED ORAL
Status: DISCONTINUED | OUTPATIENT
Start: 2022-05-04 | End: 2022-05-11 | Stop reason: HOSPADM

## 2022-05-04 RX ORDER — OLANZAPINE 5 MG/1
5 TABLET ORAL
Status: DISCONTINUED | OUTPATIENT
Start: 2022-05-04 | End: 2022-05-11 | Stop reason: HOSPADM

## 2022-05-04 RX ORDER — HYDROXYZINE 50 MG/1
50 TABLET, FILM COATED ORAL
Status: DISCONTINUED | OUTPATIENT
Start: 2022-05-04 | End: 2022-05-11 | Stop reason: HOSPADM

## 2022-05-04 RX ORDER — OLANZAPINE 10 MG/1
10 TABLET ORAL
Status: DISCONTINUED | OUTPATIENT
Start: 2022-05-04 | End: 2022-05-11 | Stop reason: HOSPADM

## 2022-05-04 RX ORDER — PERMETHRIN 50 MG/G
CREAM TOPICAL ONCE
Status: DISCONTINUED | OUTPATIENT
Start: 2022-05-22 | End: 2022-05-04

## 2022-05-04 RX ORDER — NICOTINE 21 MG/24HR
1 PATCH, TRANSDERMAL 24 HOURS TRANSDERMAL DAILY
Status: DISCONTINUED | OUTPATIENT
Start: 2022-05-04 | End: 2022-05-11 | Stop reason: HOSPADM

## 2022-05-04 RX ORDER — POLYETHYLENE GLYCOL 3350 17 G/17G
17 POWDER, FOR SOLUTION ORAL DAILY PRN
Status: DISCONTINUED | OUTPATIENT
Start: 2022-05-04 | End: 2022-05-11 | Stop reason: HOSPADM

## 2022-05-04 RX ORDER — TRAZODONE HYDROCHLORIDE 50 MG/1
100 TABLET ORAL
Status: DISCONTINUED | OUTPATIENT
Start: 2022-05-04 | End: 2022-05-11 | Stop reason: HOSPADM

## 2022-05-04 RX ORDER — ACETAMINOPHEN 325 MG/1
975 TABLET ORAL EVERY 6 HOURS PRN
Status: DISCONTINUED | OUTPATIENT
Start: 2022-05-04 | End: 2022-05-11 | Stop reason: HOSPADM

## 2022-05-04 RX ORDER — ACETAMINOPHEN 325 MG/1
650 TABLET ORAL EVERY 6 HOURS PRN
Status: DISCONTINUED | OUTPATIENT
Start: 2022-05-04 | End: 2022-05-11 | Stop reason: HOSPADM

## 2022-05-04 RX ORDER — LORAZEPAM 2 MG/ML
2 INJECTION INTRAMUSCULAR
Status: DISCONTINUED | OUTPATIENT
Start: 2022-05-04 | End: 2022-05-10

## 2022-05-04 RX ORDER — MAGNESIUM HYDROXIDE/ALUMINUM HYDROXICE/SIMETHICONE 120; 1200; 1200 MG/30ML; MG/30ML; MG/30ML
30 SUSPENSION ORAL EVERY 4 HOURS PRN
Status: DISCONTINUED | OUTPATIENT
Start: 2022-05-04 | End: 2022-05-11 | Stop reason: HOSPADM

## 2022-05-04 RX ORDER — OLANZAPINE 10 MG/1
10 INJECTION, POWDER, LYOPHILIZED, FOR SOLUTION INTRAMUSCULAR
Status: DISCONTINUED | OUTPATIENT
Start: 2022-05-04 | End: 2022-05-11 | Stop reason: HOSPADM

## 2022-05-04 RX ORDER — DOXYCYCLINE HYCLATE 100 MG/1
100 CAPSULE ORAL EVERY 12 HOURS SCHEDULED
Status: DISCONTINUED | OUTPATIENT
Start: 2022-05-04 | End: 2022-05-11 | Stop reason: HOSPADM

## 2022-05-04 RX ORDER — ACETAMINOPHEN 325 MG/1
650 TABLET ORAL EVERY 4 HOURS PRN
Status: DISCONTINUED | OUTPATIENT
Start: 2022-05-04 | End: 2022-05-11 | Stop reason: HOSPADM

## 2022-05-04 RX ORDER — LANOLIN ALCOHOL/MO/W.PET/CERES
3 CREAM (GRAM) TOPICAL
Status: DISCONTINUED | OUTPATIENT
Start: 2022-05-04 | End: 2022-05-11

## 2022-05-04 RX ORDER — FLUOXETINE HYDROCHLORIDE 20 MG/1
20 CAPSULE ORAL DAILY
Status: DISCONTINUED | OUTPATIENT
Start: 2022-05-04 | End: 2022-05-06

## 2022-05-04 RX ORDER — OLANZAPINE 2.5 MG/1
2.5 TABLET ORAL
Status: DISCONTINUED | OUTPATIENT
Start: 2022-05-04 | End: 2022-05-11 | Stop reason: HOSPADM

## 2022-05-04 RX ORDER — BENZTROPINE MESYLATE 1 MG/1
1 TABLET ORAL EVERY 6 HOURS PRN
Status: DISCONTINUED | OUTPATIENT
Start: 2022-05-04 | End: 2022-05-11 | Stop reason: HOSPADM

## 2022-05-04 RX ORDER — OLANZAPINE 10 MG/1
5 INJECTION, POWDER, LYOPHILIZED, FOR SOLUTION INTRAMUSCULAR
Status: DISCONTINUED | OUTPATIENT
Start: 2022-05-04 | End: 2022-05-11 | Stop reason: HOSPADM

## 2022-05-04 RX ORDER — LORAZEPAM 1 MG/1
1 TABLET ORAL EVERY 6 HOURS PRN
Status: DISCONTINUED | OUTPATIENT
Start: 2022-05-04 | End: 2022-05-10

## 2022-05-04 RX ORDER — LORAZEPAM 2 MG/ML
1 INJECTION INTRAMUSCULAR EVERY 4 HOURS PRN
Status: DISCONTINUED | OUTPATIENT
Start: 2022-05-04 | End: 2022-05-10

## 2022-05-04 RX ADMIN — BACITRACIN 1 SMALL APPLICATION: 500 OINTMENT TOPICAL at 13:43

## 2022-05-04 RX ADMIN — DOXYCYCLINE 100 MG: 100 CAPSULE ORAL at 13:43

## 2022-05-04 RX ADMIN — FLUOXETINE 20 MG: 20 CAPSULE ORAL at 16:36

## 2022-05-04 RX ADMIN — LORAZEPAM 1 MG: 1 TABLET ORAL at 21:33

## 2022-05-04 RX ADMIN — NICOTINE 1 PATCH: 21 PATCH, EXTENDED RELEASE TRANSDERMAL at 13:46

## 2022-05-04 RX ADMIN — Medication 3 MG: at 21:30

## 2022-05-04 RX ADMIN — DOXYCYCLINE 100 MG: 100 CAPSULE ORAL at 21:30

## 2022-05-04 RX ADMIN — LAMOTRIGINE 25 MG: 25 TABLET ORAL at 21:30

## 2022-05-04 RX ADMIN — BACITRACIN 1 SMALL APPLICATION: 500 OINTMENT TOPICAL at 18:38

## 2022-05-04 RX ADMIN — ACETAMINOPHEN 975 MG: 325 TABLET ORAL at 21:39

## 2022-05-04 NOTE — PLAN OF CARE
Problem: Ineffective Coping  Goal: Identifies ineffective coping skills  Outcome: Not Progressing  Goal: Identifies healthy coping skills  Outcome: Not Progressing  Goal: Demonstrates healthy coping skills  Outcome: Not Progressing  Goal: Participates in unit activities  Description: Interventions:  - Provide therapeutic environment   - Provide required programming   - Redirect inappropriate behaviors   Outcome: Not Progressing  Goal: Patient/Family participate in treatment and DC plans  Description: Interventions:  - Provide therapeutic environment  Outcome: Not Progressing  Goal: Patient/Family verbalizes awareness of resources  Outcome: Not Progressing  Goal: Understands least restrictive measures  Description: Interventions:  - Utilize least restrictive behavior  Outcome: Not Progressing  Goal: Free from restraint events  Description: - Utilize least restrictive measures   - Provide behavioral interventions   - Redirect inappropriate behaviors   Outcome: Not Progressing
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
## 2022-05-04 NOTE — TREATMENT PLAN
TREATMENT PLAN REVIEW - Konstantin [address] 37 y o  [birth year] female MRN: [id number]    [address] Room / Bed: [place] [address] [address] Encounter: [id number]          Admit Date/Time:  5/4/2022  3:05 AM    Treatment Team: Attending Provider: Diomedes Rouse MD; Consulting Physician: Raffy Stevens MD; Patient Care Assistant: Melissa Chang; Certified Nursing Assistant: Franchesca Sorensen; Care Manager: Jayden Benson RN; Patient Care Technician: Mikey Clarke; Licensed Practical Nurse: Seda Rausch LPN; Recreational Therapist: Josué Lopez; : Milo Cain MS; Registered Nurse:  Les Frye RN    Diagnosis: Principal Problem:    Severe episode of recurrent major depressive disorder [facility])      Patient Strengths/Assets: good physical health, negotiates basic needs, supportive family/friends    Patient Barriers/Limitations: difficulty adapting, noncompliant with medication, patient is on an involuntary commitment, poor insight    Short Term Goals: decrease in depressive symptoms, decrease in anxiety symptoms, decrease in suicidal thoughts, mood stabilization    Long Term Goals: improvement in anxiety, resolution of depressive symptoms, stabilization of mood, free of suicidal thoughts, improved insight    Progress Towards Goals: starting psychiatric medications as prescribed    Recommended Treatment: medication management, patient medication education, group therapy, milieu therapy, continued Behavioral Health psychiatric evaluation/assessment process    Treatment Frequency: daily medication monitoring, group and milieu therapy daily, monitoring through interdisciplinary rounds, monitoring through weekly patient care conferences    Expected Discharge Date:  5-7 midnights    Discharge Plan: referrals as indicated    Treatment Plan Created/Updated By: Sadie Cochran DO
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
## 2022-05-04 NOTE — PROGRESS NOTES
05/04/22 1208   Team Meeting   Meeting Type Tx Team Meeting   Team Members Present   Team Members Present Physician;Nurse;   Physician Team Member Dr Garth Wong MD   Nursing Team Member Zayra Ramirez, RN   Care Management Team Member Latonya Peacock MS, [facility], [facility]   Patient/Family Present   Patient Present Yes   Patient's Family Present No   Reviewed TX plan and goals, all in agreement and signed

## 2022-05-04 NOTE — PROGRESS NOTES
05/04/22 1000   Activity/Group Checklist   Group   (Community [employer] and [employer] Self Expression)   Attendance Did not attend  (AT group offered, PT declined)

## 2022-05-04 NOTE — ED NOTES
Pt sleeping at this time  No signs of distress   1:1 at bedside        Mariana Rodarte  05/03/22 4929

## 2022-05-04 NOTE — EMTALA/ACUTE CARE TRANSFER
[address] [facility] EMERGENCY DEPARTMENT  [facility] [address]  Dept: [phone]      LOSOSD TRANSFER CONSENT    NAME Livan Martines                                         1980                              MRN [id number]    I have been informed of my rights regarding examination, treatment, and transfer   by Dr Fortino Duncan MD    Benefits: Other benefits (Include comment)_______________________ (Inpt MH tx)    Risks: Potential for delay in receiving treatment      Transfer Request   I acknowledge that my medical condition has been evaluated and explained to me by the emergency department physician or other qualified medical person and/or my attending physician who has recommended and offered to me further medical examination and treatment  I understand the Hospital's obligation with respect to the treatment and stabilization of my emergency medical condition  I nevertheless request to be transferred  I release the Hospital, the doctor, and any other persons caring for me from all responsibility or liability for any injury or ill effects that may result from my transfer and agree to accept all responsibility for the consequences of my choice to transfer, rather than receive stabilizing treatment at the Hospital  I understand that because the transfer is my request, my insurance may not provide reimbursement for the services  The Hospital will assist and direct me and my family in how to make arrangements for transfer, but the hospital is not liable for any fees charged by the transport service  In spite of this understanding, I refuse to consent to further medical examination and treatment which has been offered to me, and request transfer to [address] : [facility]  I authorize the performance of emergency medical procedures and treatments upon me in both transit and upon arrival at the receiving facility    Additionally, I authorize the release of any and all medical records to the receiving facility and request they be transported with me, if possible  I authorize the performance of emergency medical procedures and treatments upon me in both transit and upon arrival at the receiving facility  Additionally, I authorize the release of any and all medical records to the receiving facility and request they be transported with me, if possible  I understand that the safest mode of transportation during a medical emergency is an ambulance and that the Hospital advocates the use of this mode of transport  Risks of traveling to the receiving facility by car, including absence of medical control, life sustaining equipment, such as oxygen, and medical personnel has been explained to me and I fully understand them  (SYLVIE CORRECT BOX BELOW)  [  ]  I consent to the stated transfer and to be transported by ambulance/helicopter  [  ]  I consent to the stated transfer, but refuse transportation by ambulance and accept full responsibility for my transportation by car  I understand the risks of non-ambulance transfers and I exonerate the Hospital and its staff from any deterioration in my condition that results from this refusal     X___________________________________________    DATE  22  TIME________  Signature of patient or legally responsible individual signing on patient behalf           RELATIONSHIP TO PATIENT_________________________          Provider Certification    NAME Marysol WRIGHT 1980                              MRN [id number]    A medical screening exam was performed on the above named patient  Based on the examination:    Condition Necessitating Transfer The primary encounter diagnosis was Suicidal ideation  A diagnosis of Scabies was also pertinent to this visit      Patient Condition: The patient has been stabilized such that within reasonable medical probability, no material deterioration of the patient condition or the condition of the unborn child(joyce) is likely to result from the transfer    Reason for Transfer: Level of Care needed not available at this facility    Transfer Requirements: [place]   · Space available and qualified personnel available for treatment as acknowledged by    · Agreed to accept transfer and to provide appropriate medical treatment as acknowledged by       NP HOSP DR NAHUM DODD  · Appropriate medical records of the examination and treatment of the patient are provided at the time of transfer   [address] _______  · Transfer will be performed by qualified personnel from [facility]  and appropriate transfer equipment as required, including the use of necessary and appropriate life support measures  Provider Certification: I have examined the patient and explained the following risks and benefits of being transferred/refusing transfer to the patient/family:  General risk, such as traffic hazards, adverse weather conditions, rough terrain or turbulence, possible failure of equipment (including vehicle or aircraft), or consequences of actions of persons outside the control of the transport personnel      Based on these reasonable risks and benefits to the patient and/or the unborn child(joyce), and based upon the information available at the time of the patients examination, I certify that the medical benefits reasonably to be expected from the provision of appropriate medical treatments at another medical facility outweigh the increasing risks, if any, to the individuals medical condition, and in the case of labor to the unborn child, from effecting the transfer      X____________________________________________ DATE 05/03/22        TIME_______      ORIGINAL - SEND TO MEDICAL RECORDS   COPY - SEND WITH PATIENT DURING TRANSFER

## 2022-05-04 NOTE — PROGRESS NOTES
05/04/22 5892   Activity/Group Checklist   Group   ([employer])   Attendance Did not attend  (AT group offered, PT elected to remain in room)

## 2022-05-04 NOTE — NURSING NOTE
Pt was withdrawn to her room  Pt refused to eat breakfast or lunch  Pt stated, " I'm too hideous for people to look at me " Support was offered and pt agreed to go to dinner  Pt endorses anxiety and depression and states she is , "hopeless " Pt has remained in her bed with the covers over her head all shift  Pt denies SI/HI/AVH  Q 7 minute checks were maintained

## 2022-05-04 NOTE — NURSING NOTE
Presents with flat affect,endorses depression and anxiety as moderate yet does agree to inform staff of any thoughts of SIs,she does deny that and HIs,AH,VH  She did not attend groups today,withdrawn to room  She did not keep her promise to dine in with peers due to "feeling" tired,and her physical appearance  This writer did remind her she can appear on unit later today  She is urinating and did consume 25% of her dinner after encouragement,voided this shift influenza addition to documented education we discussed the importance of being visible on the unit,consuming nutrition and hydration as these are used as a factor in determining "ready and safe for DC ": stated understanding  Will continue to educate,monitor,and provide safe,therapeutic milieu

## 2022-05-04 NOTE — H&P
Nathan Sawyer#  [id number]  EFT:[id number]    BLP:[id number]  Adm Date: 5/4/2022 0305  3:05 AM   ATT PHY: Saida Solomon, [address]          Chief Complaint:  Worsening depression and anxiety symptoms with suicidal ideation    History of Presenting Illness: Clarisa Castellanos is a(n) 37y o  year old female was admitted through emergency department where she presented with worsening depression and anxiety symptoms with suicidal ideations  Patient examined at bedside  Patient denied any active suicidal homicidal ideations  Allergies   Allergen Reactions    Wheat Bran - Food Allergy Rash    Penicillins Other (See Comments)     unknown       Current Facility-Administered Medications on File Prior to Encounter   Medication Dose Route Frequency Provider Last Rate Last Admin    [DISCONTINUED] atoMOXetine (STRATTERA) capsule 40 mg  40 mg Oral Daily Mick Greenwood MD        [DISCONTINUED] clonazePAM (KlonoPIN) tablet 0 5 mg  0 5 mg Oral BID PRN Mick Greenwood MD   0 5 mg at 05/03/22 1708    [DISCONTINUED] FLUoxetine (PROzac) capsule 10 mg  10 mg Oral Daily Mick Greenwood MD   10 mg at 05/03/22 1708    [DISCONTINUED] LORazepam (ATIVAN) tablet 1 mg  1 mg Oral Once Brenden Masters MD        [DISCONTINUED] melatonin tablet 3 mg  3 mg Oral HS Dolores Motta MD        [DISCONTINUED] nicotine (NICODERM CQ) 14 mg/24hr TD 24 hr patch 14 mg  14 mg Transdermal Daily Dolores Motta MD   14 mg at 05/03/22 1808    [DISCONTINUED] nicotine (NICODERM CQ) 7 mg/24hr TD 24 hr patch 7 mg  7 mg Transdermal Once Sebas Bal DO        [DISCONTINUED] OLANZapine (ZyPREXA ZYDIS) dispersible tablet 10 mg  10 mg Oral PRN Brenden Masters MD        [DISCONTINUED] OLANZapine (ZyPREXA) IM injection 10 mg  10 mg Intramuscular Q3H PRN Min James Pink MD         No current outpatient medications on file prior to encounter         Active Ambulatory Problems Diagnosis Date Noted    No Active Ambulatory Problems     Resolved Ambulatory Problems     Diagnosis Date Noted    No Resolved Ambulatory Problems     No Additional Past Medical History       History reviewed  No pertinent surgical history  Social History:   Social History     Socioeconomic History    Marital status: /Civil Union     Spouse name: None    Number of children: None    Years of education: None    Highest education level: None   Occupational History    None   Tobacco Use    Smoking status: Never Smoker    Smokeless tobacco: Never Used   Substance and Sexual Activity    Alcohol use: Not Currently    Drug use: Not Currently    Sexual activity: None   Other Topics Concern    None   Social History Narrative    None     Social Determinants of Health     Financial Resource Strain: Not on file   Food Insecurity: Not on file   Transportation Needs: Not on file   Physical Activity: Not on file   Stress: Not on file   Social Connections: Not on file   Intimate Partner Violence: Not on file   Housing Stability: Not on file       Family History:   Family History   Problem Relation Age of Onset    Cirrhosis Father        Review of Systems   Musculoskeletal: Positive for arthralgias  Skin: Positive for wound  Pruritus   Neurological: Positive for headaches  All other systems reviewed and are negative  Physical Exam   Vitals: Blood pressure 119/65, pulse 78, temperature 98 6 °F (37 °C), temperature source Temporal, resp  rate 18, height 5' 7" (1 702 m), weight 56 7 kg (125 lb), SpO2 97 %  ,Body mass index is 19 58 kg/m²  Constitutional: Awake and Alert  Well-developed and well-nourished  No distress  HENT: PERR,EOMI, conjunctiva normal  Head: Normocephalic and atraumatic  Mouth/Throat: Oropharynx is clear and moist     Eyes: Conjunctivae and EOM are normal  Pupils are equal, round, and reactive to light  Right and left eye exhibits no discharge  Neck: Neck supple   No tracheal deviation present  No thyromegaly present  Cardiovascular: Normal rate, regular rhythm and normal heart sounds  Exam reveals no friction rub  No murmur heard  Pulmonary/Chest: Effort normal and breath sounds normal  No respiratory distress  She has no wheezes  Abdominal: Soft  Bowel sounds are normal  She exhibits no distension  There is no tenderness  There is no rebound and no guarding  Neurological: Cranial Nerves grossly intact  No sensory deficit  Coordination normal    Musculoskeletal:   Nontender spine  Skin: Skin is warm and dry  No rash noted  No diaphoresis  No erythema  No edema  No cyanosis  Agapito De La Paz is a(n) 37y o  year old female with MDD    1  History of nicotine dependence  I will put the patient on nicotine transdermal patch 21 mg daily  2  History of Lyme disease  Patient is currently on doxycycline 100 mg b i d   3  Recent scabies  Patient received Elimite cream treatment on 05/02/2022  I will repeat the treatment with the Elimite 5% cream on 05/08/2022   4  Open wounds over the face and extremity  I will treat the patient with bacitracin ointment b i d  5  Insomnia  Patient is on melatonin at bedtime along with trazodone p r n  6  Arthritis/headache  Patient may get Tylenol on as needed basis  7  Psych with MDD  Patient is being managed by psych  Prognosis: Fair  Discharge Plan: In progress  Advanced Directives: I have discussed in detail the patient the advanced directives  Patient has not appointed anybody as her POA and has no living will with advanced healthcare directives  Patient's 1st contact is her mother Rafael Bill and her phone number is [phone]  When discussing cardiac and pulmonary resuscitation efforts with the patient, the patient wishes to be FULL CODE      I have spent more than 50 minutes gathering data, doing physical examination, and discussing the advanced directives, which was witnessed by caring staff

## 2022-05-04 NOTE — ED NOTES
Pt sleeping at this time  No signs of distress   1:1 at bedside         Pearl Montiel  05/03/22 2053

## 2022-05-04 NOTE — SOCIAL WORK
Provider and CM met with PT  PT reports that she is not feeling well, tired  CM and provider reviewed with PT plan of care along with expectations of unit  Reviewed that PT is currently on 302  PT agreeable to staying for treatment until next week  Reviewed with PT plan of care again, along with [place] expectations, restrictions etc , reviewed with PT her rights  PT in agreement to stay for [place] voluntarily and PT signed 201  PT reports that she wishes to complete psycho soc and ROIs tomorrow after she has had some rest  CM in agreement  Reassurance provided

## 2022-05-04 NOTE — PLAN OF CARE
Problem: Alteration in Thoughts and Perception  Goal: Treatment Goal: Gain control of psychotic behaviors/thinking, reduce/eliminate presenting symptoms and demonstrate improved reality functioning upon discharge  5/4/2022 0331 by Paty Angel RN  Outcome: Progressing  5/4/2022 0331 by Paty Angel RN  Outcome: Progressing     Problem: Alteration in Thoughts and Perception  Goal: Treatment Goal: Gain control of psychotic behaviors/thinking, reduce/eliminate presenting symptoms and demonstrate improved reality functioning upon discharge  5/4/2022 0331 by Paty Angel RN  Outcome: Progressing  5/4/2022 0331 by Paty Angel RN  Outcome: Progressing  Goal: Verbalize thoughts and feelings  Description: Interventions:  - Promote a nonjudgmental and trusting relationship with the patient through active listening and therapeutic communication  - Assess patient's level of functioning, behavior and potential for risk  - Engage patient in 1 on 1 interactions  - Encourage patient to express fears, feelings, frustrations, and discuss symptoms    - [place] patient to reality, help patient recognize reality-based thinking   - Administer medications as ordered and assess for potential side effects  - Provide the patient education related to the signs and symptoms of the illness and desired effects of prescribed medications  5/4/2022 0331 by Paty Angel RN  Outcome: Progressing  5/4/2022 0331 by Paty Angel RN  Outcome: Progressing  Goal: Refrain from acting on delusional thinking/internal stimuli  Description: Interventions:  - Monitor patient closely, per order   - Utilize least restrictive measures   - Set reasonable limits, give positive feedback for acceptable   - Administer medications as ordered and monitor of potential side effects  5/4/2022 0331 by Paty Angel RN  Outcome: Progressing  5/4/2022 0331 by Paty Angel RN  Outcome: Progressing  Goal: Agree to be compliant with medication regime, as prescribed and report medication side effects  Description: Interventions:  - Offer appropriate PRN medication and supervise ingestion; conduct AIMS, as needed   5/4/2022 0331 by Denise Doran RN  Outcome: Progressing  5/4/2022 [phone] by Denise Doran RN  Outcome: Progressing  Goal: Attend and participate in unit activities, including therapeutic, recreational, and educational groups  Description: Interventions:  -Encourage Visitation and family involvement in care  5/4/2022 0331 by Denise Doran RN  Outcome: Progressing  5/4/2022 [phone] by Denise Doran RN  Outcome: Progressing  Goal: Recognize dysfunctional thoughts, communicate reality-based thoughts at the time of discharge  Description: Interventions:  - Provide medication and psycho-education to assist patient in compliance and developing insight into his/her illness   5/4/2022 0331 by Denise Doran RN  Outcome: Progressing  5/4/2022 [phone] by Denise Doran RN  Outcome: Progressing  Goal: Complete daily ADLs, including personal hygiene independently, as able  Description: Interventions:  - Observe, teach, and assist patient with ADLS  - Monitor and promote a balance of rest/activity, with adequate nutrition and elimination   5/4/2022 0331 by Denise Doran RN  Outcome: Progressing  5/4/2022 0331 by Denise Doran RN  Outcome: Progressing

## 2022-05-04 NOTE — PROGRESS NOTES
Pt's belongings double bagged and placed in soiled holding room at this time due to pt being positive for scabies   Belongings to be inventoried at a later time

## 2022-05-04 NOTE — PROGRESS NOTES
05/04/22 0830   Team Meeting   Meeting Type Daily Rounds   Team Members Present   Team Members Present Physician;Nurse;   Physician Team Member Dr Homa Barrientos MD; Malina Brandt [place]   Nursing Team Member Magdalena Soto, SHANA   Care Management Team Member Leo Pizarro MS, [facility]   Patient/Family Present   Patient Present No   Patient's Family Present No   New admission, readmit score 12, 36, si, [facility] outpatient, aa meetings, nurse, hopeless, uds positive, positive for scabies treated, plan of si was to go to hotel and od on klonopin and alcohol

## 2022-05-05 LAB
25(OH)D3 SERPL-MCNC: 56.6 NG/ML (ref 30–100)
ALBUMIN SERPL BCP-MCNC: 3.7 G/DL (ref 3.5–5)
ALP SERPL-CCNC: 40 U/L (ref 34–104)
ALT SERPL W P-5'-P-CCNC: 12 U/L (ref 7–52)
ANION GAP SERPL CALCULATED.3IONS-SCNC: 8 MMOL/L (ref 4–13)
AST SERPL W P-5'-P-CCNC: 17 U/L (ref 13–39)
ATRIAL RATE: 90 BPM
BASOPHILS # BLD AUTO: 0.05 THOUSANDS/ΜL (ref 0–0.1)
BASOPHILS NFR BLD AUTO: 1 % (ref 0–1)
BILIRUB SERPL-MCNC: 0.46 MG/DL (ref 0.2–1)
BUN SERPL-MCNC: 13 MG/DL (ref 5–25)
CALCIUM SERPL-MCNC: 8.5 MG/DL (ref 8.4–10.2)
CHLORIDE SERPL-SCNC: 103 MMOL/L (ref 96–108)
CHOLEST SERPL-MCNC: 141 MG/DL
CO2 SERPL-SCNC: 25 MMOL/L (ref 21–32)
CREAT SERPL-MCNC: 0.76 MG/DL (ref 0.6–1.3)
EOSINOPHIL # BLD AUTO: 0.22 THOUSAND/ΜL (ref 0–0.61)
EOSINOPHIL NFR BLD AUTO: 5 % (ref 0–6)
ERYTHROCYTE [DISTWIDTH] IN BLOOD BY AUTOMATED COUNT: 11.9 % (ref 11.6–15.1)
FOLATE SERPL-MCNC: >20 NG/ML (ref 3.1–17.5)
GFR SERPL CREATININE-BSD FRML MDRD: 98 ML/MIN/1.73SQ M
GLUCOSE P FAST SERPL-MCNC: 77 MG/DL (ref 65–99)
GLUCOSE SERPL-MCNC: 77 MG/DL (ref 65–140)
HCT VFR BLD AUTO: 40.9 % (ref 34.8–46.1)
HDLC SERPL-MCNC: 58 MG/DL
HGB BLD-MCNC: 13.3 G/DL (ref 11.5–15.4)
IMM GRANULOCYTES # BLD AUTO: 0.01 THOUSAND/UL (ref 0–0.2)
IMM GRANULOCYTES NFR BLD AUTO: 0 % (ref 0–2)
LDLC SERPL CALC-MCNC: 72 MG/DL (ref 0–100)
LYMPHOCYTES # BLD AUTO: 1.52 THOUSANDS/ΜL (ref 0.6–4.47)
LYMPHOCYTES NFR BLD AUTO: 34 % (ref 14–44)
MCH RBC QN AUTO: 31.1 PG (ref 26.8–34.3)
MCHC RBC AUTO-ENTMCNC: 32.5 G/DL (ref 31.4–37.4)
MCV RBC AUTO: 96 FL (ref 82–98)
MONOCYTES # BLD AUTO: 0.57 THOUSAND/ΜL (ref 0.17–1.22)
MONOCYTES NFR BLD AUTO: 13 % (ref 4–12)
NEUTROPHILS # BLD AUTO: 2.16 THOUSANDS/ΜL (ref 1.85–7.62)
NEUTS SEG NFR BLD AUTO: 47 % (ref 43–75)
NONHDLC SERPL-MCNC: 83 MG/DL
NRBC BLD AUTO-RTO: 0 /100 WBCS
P AXIS: 83 DEGREES
PLATELET # BLD AUTO: 214 THOUSANDS/UL (ref 149–390)
PMV BLD AUTO: 10.7 FL (ref 8.9–12.7)
POTASSIUM SERPL-SCNC: 3.8 MMOL/L (ref 3.5–5.3)
PR INTERVAL: 146 MS
PROT SERPL-MCNC: 6 G/DL (ref 6.4–8.4)
QRS AXIS: 68 DEGREES
QRSD INTERVAL: 68 MS
QT INTERVAL: 368 MS
QTC INTERVAL: 450 MS
RBC # BLD AUTO: 4.27 MILLION/UL (ref 3.81–5.12)
SODIUM SERPL-SCNC: 136 MMOL/L (ref 135–147)
T WAVE AXIS: 74 DEGREES
TRIGL SERPL-MCNC: 53 MG/DL
TSH SERPL DL<=0.05 MIU/L-ACNC: 0.2 UIU/ML (ref 0.45–4.5)
VENTRICULAR RATE: 90 BPM
VIT B12 SERPL-MCNC: 1395 PG/ML (ref 100–900)
WBC # BLD AUTO: 4.53 THOUSAND/UL (ref 4.31–10.16)

## 2022-05-05 PROCEDURE — 99232 SBSQ HOSP IP/OBS MODERATE 35: CPT | Performed by: NURSE PRACTITIONER

## 2022-05-05 PROCEDURE — 84443 ASSAY THYROID STIM HORMONE: CPT | Performed by: NURSE PRACTITIONER

## 2022-05-05 PROCEDURE — 82607 VITAMIN B-12: CPT | Performed by: FAMILY MEDICINE

## 2022-05-05 PROCEDURE — 85025 COMPLETE CBC W/AUTO DIFF WBC: CPT | Performed by: NURSE PRACTITIONER

## 2022-05-05 PROCEDURE — 82746 ASSAY OF FOLIC ACID SERUM: CPT | Performed by: FAMILY MEDICINE

## 2022-05-05 PROCEDURE — 93010 ELECTROCARDIOGRAM REPORT: CPT | Performed by: INTERNAL MEDICINE

## 2022-05-05 PROCEDURE — 80061 LIPID PANEL: CPT | Performed by: NURSE PRACTITIONER

## 2022-05-05 PROCEDURE — 82306 VITAMIN D 25 HYDROXY: CPT | Performed by: FAMILY MEDICINE

## 2022-05-05 PROCEDURE — 80053 COMPREHEN METABOLIC PANEL: CPT | Performed by: NURSE PRACTITIONER

## 2022-05-05 RX ORDER — FLUCONAZOLE 150 MG/1
150 TABLET ORAL ONCE
Status: DISCONTINUED | OUTPATIENT
Start: 2022-05-05 | End: 2022-05-11

## 2022-05-05 RX ORDER — FLUCONAZOLE 100 MG/1
200 TABLET ORAL DAILY
Status: DISCONTINUED | OUTPATIENT
Start: 2022-05-06 | End: 2022-05-05

## 2022-05-05 RX ORDER — FLUCONAZOLE 150 MG/1
150 TABLET ORAL ONCE
Status: DISCONTINUED | OUTPATIENT
Start: 2022-05-05 | End: 2022-05-05

## 2022-05-05 RX ADMIN — DOXYCYCLINE 100 MG: 100 CAPSULE ORAL at 08:59

## 2022-05-05 RX ADMIN — TRAZODONE HYDROCHLORIDE 100 MG: 50 TABLET ORAL at 23:22

## 2022-05-05 RX ADMIN — Medication 3 MG: at 20:36

## 2022-05-05 RX ADMIN — FLUOXETINE 20 MG: 20 CAPSULE ORAL at 08:59

## 2022-05-05 RX ADMIN — LAMOTRIGINE 25 MG: 25 TABLET ORAL at 20:36

## 2022-05-05 NOTE — CASE MANAGEMENT
CM placed a phone call to patient's provider, [facility], for notification of hospital admission  Message left on facility VM  Patient is uncertain of date of May appt  Will call provider with D/C date  Phone number left to call with any questions

## 2022-05-05 NOTE — PROGRESS NOTES
Progress Note - Louise Eden 37 y o  female MRN: [id number]    Unit/Bed#: [facility] 220-02 Encounter: [id number]        Subjective:   Patient seen and examined at bedside after reviewing the chart and discussing the case with the caring staff  Patient examined at bedside  Patient has no acute concerns  Physical Exam   Vitals: Blood pressure 100/69, pulse 66, temperature 98 5 °F (36 9 °C), temperature source Temporal, resp  rate 16, height 5' 7" (1 702 m), weight 56 7 kg (125 lb), SpO2 98 %  ,Body mass index is 19 58 kg/m²  Constitutional: Patient appears well-developed  HEENT: PERR, EOMI, MMM  Cardiovascular: Normal rate and regular rhythm  Pulmonary/Chest: Effort normal and breath sounds normal    Abdomen: Soft, + BS, NT    Assessment/Plan:  Louise Eden is a(n) 37y o  year old female with MDD      1  History of nicotine dependence  Patient is on nicotine transdermal patch 21 mg daily  2  History of Lyme disease  Patient is currently on doxycycline 100 mg b i d   3  Recent scabies  Patient received Elimite cream treatment on 05/02/2022  Repeat the treatment with the Elimite 5% cream on 05/08/2022   4  Open wounds over the face and extremity  Bacitracin ointment b i d   5  Insomnia  Patient is on melatonin at bedtime along with trazodone p r n   6  Arthritis/headache  Patient may get Tylenol on as needed basis  The patient was discussed with Dr Serena Wolf and he is in agreement with the above note

## 2022-05-05 NOTE — NURSING NOTE
Pt was withdrawn to her room  Pt was minimal in staff and peer interaction  In the evening pt asked to shower, change her clothes and sheets  Pt brightened after the shower  Pt expressed that she was beginning to feel better not that she took a shower  Pt said she has never felt so gross in her life and she was embarrassed  Pt stated she feels like she has a yeast infection  Pt was placed on the medical board for tomorrow  Pt denied SI/HI/AVH  Q 7 minute safety checks were maintained

## 2022-05-05 NOTE — PLAN OF CARE
Problem: Risk for Self Injury/Neglect  Goal: Refrain from harming self  Description: Interventions:  - Monitor patient closely, per order  - Develop a trusting relationship  - Supervise medication ingestion, monitor effects and side effects   Outcome: Progressing     Problem: Risk for Self Injury/Neglect  Goal: Treatment Goal: Remain safe during length of stay, learn and adopt new coping skills, and be free of self-injurious ideation, impulses and acts at the time of discharge  Outcome: Not Progressing  Goal: Verbalize thoughts and feelings  Description: Interventions:  - Assess and re-assess patient's lethality and potential for self-injury  - Engage patient in 1:1 interactions, daily, for a minimum of 15 minutes  - Encourage patient to express feelings, fears, frustrations, hopes  - Establish rapport/trust with patient   Outcome: Not Progressing  Goal: Attend and participate in unit activities, including therapeutic, recreational, and educational groups  Description: Interventions:  - Provide therapeutic and educational activities daily, encourage attendance and participation, and document same in the medical record  - Obtain collateral information, encourage visitation and family involvement in care   Outcome: Not Progressing  Goal: Recognize maladaptive responses and adopt new coping mechanisms  Outcome: Not Progressing  Goal: Complete daily ADLs, including personal hygiene independently, as able  Description: Interventions:  - Observe, teach, and assist patient with ADLS  - Monitor and promote a balance of rest/activity, with adequate nutrition and elimination  Outcome: Not Progressing     Problem: Nutrition/Hydration-ADULT  Goal: Nutrient/Hydration intake appropriate for improving, restoring or maintaining nutritional needs  Description: Monitor and assess patient's nutrition/hydration status for malnutrition  Collaborate with interdisciplinary team and initiate plan and interventions as ordered    Monitor patient's weight and dietary intake as ordered or per policy  Utilize nutrition screening tool and intervene as necessary  Determine patient's food preferences and provide high-protein, high-caloric foods as appropriate       INTERVENTIONS:  - Monitor oral intake, urinary output, labs, and treatment plans  - Assess nutrition and hydration status and recommend course of action  - Evaluate amount of meals eaten  - Assist patient with eating if necessary   - Allow adequate time for meals  - Recommend/ encourage appropriate diets, oral nutritional supplements, and vitamin/mineral supplements  - Order, calculate, and assess calorie counts as needed  - Recommend, monitor, and adjust tube feedings and TPN/PPN based on assessed needs  - Assess need for intravenous fluids  - Provide specific nutrition/hydration education as appropriate  - Include patient/family/caregiver in decisions related to nutrition  Outcome: Not Progressing     Problem: Ineffective Coping  Goal: Participates in unit activities  Description: Interventions:  - Provide therapeutic environment   - Provide required programming   - Redirect inappropriate behaviors   Outcome: Not Progressing
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
## 2022-05-05 NOTE — PROGRESS NOTES
Progress Note - Clem Correa [age] y o  female MRN: [id number]  Unit/Bed#: U 220-02 Encounter: [id number]    Assessment/Plan   Principal Problem:    Severe episode of recurrent major depressive disorder ([facility] [address] )  Active Problems:    Substance use      Behavior over the last 24 hours:  unchanged  Sleep: hypersomnia  Appetite: fair  Medication side effects: No  ROS:  Unable to obtain due to patient factors, did not appear in any physical or mental distress    This provider attempted to meet with Amy multiple times throughout the day for psychiatric follow-up  Each time, patient was scant in response and minimally engaged in encounter  Patient did not make eye contact and she would fall asleep shortly after introduction  According to nursing staff, patient has been sleeping in bed for most of the day and has had minimal interaction with peers and staff  Patient got up to use the phone once, otherwise has been withdrawn to room  Nursing reports that patient has been calm and cooperative during previous interactions and continues to deny suicidal ideation  During brief interaction with patient, she did not appear in any physical or mental distress, but affect congruent with depression  Hygiene remains poor, withdrawn, avolitional, and apathetic      Mental Status Evaluation:  Exam limited due to poor patient participation  Appearance:  disheveled and Poor hygiene, laying in bed with face covered   Behavior:  guarded, avoidant eye contact   Speech:  Scant, soft   Mood:  depressed   Affect:  Dysphoric   Thought Process:  Unable to assess   Thought Content:  Unable to assess   Perceptual Disturbances: Unable to assess, does not appear internally preoccupied   Risk Potential: Suicidal Ideations none  Homicidal Ideations none  Potential for Aggression No   Sensorium:  Unable to assess   Memory:  patient does not answer   Consciousness:  sedated    Attention: Decreased attention/concentration Insight:  Unable to assess   Judgment: Unable to assess   Gait/Station: normal gait/station and normal balance   Motor Activity: no abnormal movements     Progress Toward Goals:  Unchanged  Remains withdrawn and continues to exhibit depressive symptoms of anhedonia, avolition, apathy, isolation, and poor hygiene  Most of assessment obtained through observation and nursing report  Will continue with current psychotropic regimen with plan to further titrate Prozac for depressive symptoms  Will return home upon stabilization  No discharge date at this time  Recommended Treatment: Continue with group therapy, milieu therapy and occupational therapy  Risks, benefits and possible side effects of Medications:   Patient does not verbalize understanding at this time and will require further explanation        Medications:   all current active meds have been reviewed and current meds:   Current Facility-Administered Medications   Medication Dose Route Frequency    acetaminophen (TYLENOL) tablet 650 mg  650 mg Oral Q6H PRN    acetaminophen (TYLENOL) tablet 650 mg  650 mg Oral Q4H PRN    acetaminophen (TYLENOL) tablet 975 mg  975 mg Oral Q6H PRN    aluminum-magnesium hydroxide-simethicone (MYLANTA) oral suspension 30 mL  30 mL Oral Q4H PRN    bacitracin topical ointment 1 small application  1 small application Topical BID    benztropine (COGENTIN) tablet 1 mg  1 mg Oral Q6H PRN    doxycycline hyclate (VIBRAMYCIN) capsule 100 mg  100 mg Oral Q12H [facility]    FLUoxetine (PROzac) capsule 20 mg  20 mg Oral Daily    hydrOXYzine HCL (ATARAX) tablet 25 mg  25 mg Oral Q6H PRN Max 4/day    hydrOXYzine HCL (ATARAX) tablet 50 mg  50 mg Oral Q4H PRN Max 4/day    Or    LORazepam (ATIVAN) injection 1 mg  1 mg Intramuscular Q4H PRN    lamoTRIgine (LaMICtal) tablet 25 mg  25 mg Oral HS    LORazepam (ATIVAN) tablet 1 mg  1 mg Oral Q6H PRN    Or    LORazepam (ATIVAN) injection 2 mg  2 mg Intramuscular Q6H PRN Max 3/day    melatonin tablet 3 mg  3 mg Oral HS    nicotine (NICODERM CQ) 21 mg/24 hr TD 24 hr patch 1 patch  1 patch Transdermal Daily    OLANZapine (ZyPREXA) tablet 10 mg  10 mg Oral Q3H PRN Max 3/day    Or    OLANZapine (ZyPREXA) IM injection 10 mg  10 mg Intramuscular Q3H PRN Max 3/day    OLANZapine (ZyPREXA) tablet 5 mg  5 mg Oral Q3H PRN Max 6/day    Or    OLANZapine (ZyPREXA) IM injection 5 mg  5 mg Intramuscular Q3H PRN Max 6/day    OLANZapine (ZyPREXA) tablet 2 5 mg  2 5 mg Oral Q3H PRN Max 8/day    [START ON 5/8/2022] permethrin (ELIMITE) 5 % cream   Topical Once    polyethylene glycol (MIRALAX) packet 17 g  17 g Oral Daily PRN    traZODone (DESYREL) tablet 100 mg  100 mg Oral HS PRN     Labs: I have personally reviewed all pertinent laboratory/tests results  Counseling / Coordination of Care  Total floor / unit time spent today 25 minutes  Greater than 50% of total time was spent with the patient and / or family counseling and / or coordination of care

## 2022-05-05 NOTE — NURSING NOTE
Patient remains withdrawn to room  Slight tempt 99 4 medicated with 975mg tylenol PO at 2139--effective 98 4 and encouraged hydration--large juice provided  Patient did go to dayroom for snack  Patient is calm,cooperative and pleasant  Patient did state she will shower in am just still not feeling great  Currently patient denies all SI HI AVH  Does endorse depression and anxiety  Medicated at 2139 with ativan 1mg po effective  Will continue to monitor and provide support  Q 7 minute safety and behavioral checks maintained

## 2022-05-05 NOTE — CASE MANAGEMENT
CM placed phone call to patient's mother, Luisa Johnson, with an update and to offer opportunity for questions/concerns and provide direct number for return call  Patient's mother stated that the commitment was difficult for her to enact, and she was happy that the patient signed in voluntarily  She stated that the patient is a bright and loving individual who has been through a lot and would benefit from treatment at this time  Phone numbers for the nurses' station and SW were provided  Patient's mother encouraged to call with concerns and is available to future support as needed

## 2022-05-05 NOTE — PROGRESS NOTES
05/05/22 0830   Team Meeting   Meeting Type Daily Rounds   Team Members Present   Team Members Present Physician;;Nurse   Physician Team Member Dr Victorino wong MD; Kranthi Suarez, [address] [place] [address]   Nursing Team Member Abner Calvillo, RN   Care Management Team Member Anne Mcclain MS, [facility]   Patient/Family Present   Patient Present No   Patient's Family Present No   With drawn to room, poor appetite, poor hygiene, encouraging fluids, conscious about appearance, c/o anxiwty, next tx for scabbies is the 8th

## 2022-05-06 LAB
ALBUMIN SERPL BCP-MCNC: 4.2 G/DL (ref 3.5–5)
ALP SERPL-CCNC: 42 U/L (ref 34–104)
ALT SERPL W P-5'-P-CCNC: 12 U/L (ref 7–52)
ANION GAP SERPL CALCULATED.3IONS-SCNC: 6 MMOL/L (ref 4–13)
AST SERPL W P-5'-P-CCNC: 15 U/L (ref 13–39)
ATRIAL RATE: 63 BPM
BASOPHILS # BLD AUTO: 0.04 THOUSANDS/ΜL (ref 0–0.1)
BASOPHILS NFR BLD AUTO: 1 % (ref 0–1)
BILIRUB SERPL-MCNC: 0.49 MG/DL (ref 0.2–1)
BUN SERPL-MCNC: 10 MG/DL (ref 5–25)
CALCIUM SERPL-MCNC: 9.3 MG/DL (ref 8.4–10.2)
CHLORIDE SERPL-SCNC: 102 MMOL/L (ref 96–108)
CO2 SERPL-SCNC: 30 MMOL/L (ref 21–32)
CREAT SERPL-MCNC: 0.94 MG/DL (ref 0.6–1.3)
EOSINOPHIL # BLD AUTO: 0.14 THOUSAND/ΜL (ref 0–0.61)
EOSINOPHIL NFR BLD AUTO: 2 % (ref 0–6)
ERYTHROCYTE [DISTWIDTH] IN BLOOD BY AUTOMATED COUNT: 11.9 % (ref 11.6–15.1)
GFR SERPL CREATININE-BSD FRML MDRD: 76 ML/MIN/1.73SQ M
GLUCOSE SERPL-MCNC: 128 MG/DL (ref 65–140)
GLUCOSE SERPL-MCNC: 144 MG/DL (ref 65–140)
HCT VFR BLD AUTO: 45.6 % (ref 34.8–46.1)
HGB BLD-MCNC: 14.7 G/DL (ref 11.5–15.4)
IMM GRANULOCYTES # BLD AUTO: 0.01 THOUSAND/UL (ref 0–0.2)
IMM GRANULOCYTES NFR BLD AUTO: 0 % (ref 0–2)
LYMPHOCYTES # BLD AUTO: 1.5 THOUSANDS/ΜL (ref 0.6–4.47)
LYMPHOCYTES NFR BLD AUTO: 26 % (ref 14–44)
MCH RBC QN AUTO: 31.1 PG (ref 26.8–34.3)
MCHC RBC AUTO-ENTMCNC: 32.2 G/DL (ref 31.4–37.4)
MCV RBC AUTO: 96 FL (ref 82–98)
MONOCYTES # BLD AUTO: 0.51 THOUSAND/ΜL (ref 0.17–1.22)
MONOCYTES NFR BLD AUTO: 9 % (ref 4–12)
NEUTROPHILS # BLD AUTO: 3.59 THOUSANDS/ΜL (ref 1.85–7.62)
NEUTS SEG NFR BLD AUTO: 62 % (ref 43–75)
NRBC BLD AUTO-RTO: 0 /100 WBCS
P AXIS: 84 DEGREES
PLATELET # BLD AUTO: 232 THOUSANDS/UL (ref 149–390)
PMV BLD AUTO: 10.6 FL (ref 8.9–12.7)
POTASSIUM SERPL-SCNC: 3.9 MMOL/L (ref 3.5–5.3)
PR INTERVAL: 160 MS
PROT SERPL-MCNC: 6.9 G/DL (ref 6.4–8.4)
QRS AXIS: 68 DEGREES
QRSD INTERVAL: 74 MS
QT INTERVAL: 406 MS
QTC INTERVAL: 415 MS
RBC # BLD AUTO: 4.73 MILLION/UL (ref 3.81–5.12)
SODIUM SERPL-SCNC: 138 MMOL/L (ref 135–147)
T WAVE AXIS: 72 DEGREES
VENTRICULAR RATE: 63 BPM
WBC # BLD AUTO: 5.79 THOUSAND/UL (ref 4.31–10.16)

## 2022-05-06 PROCEDURE — 93005 ELECTROCARDIOGRAM TRACING: CPT

## 2022-05-06 PROCEDURE — 85025 COMPLETE CBC W/AUTO DIFF WBC: CPT | Performed by: HOSPITALIST

## 2022-05-06 PROCEDURE — 80053 COMPREHEN METABOLIC PANEL: CPT | Performed by: HOSPITALIST

## 2022-05-06 PROCEDURE — 99232 SBSQ HOSP IP/OBS MODERATE 35: CPT | Performed by: HOSPITALIST

## 2022-05-06 PROCEDURE — 93010 ELECTROCARDIOGRAM REPORT: CPT | Performed by: INTERNAL MEDICINE

## 2022-05-06 PROCEDURE — 82948 REAGENT STRIP/BLOOD GLUCOSE: CPT

## 2022-05-06 RX ORDER — CLONAZEPAM 0.5 MG/1
0.25 TABLET ORAL DAILY
Status: COMPLETED | OUTPATIENT
Start: 2022-05-09 | End: 2022-05-09

## 2022-05-06 RX ORDER — FLUOXETINE HYDROCHLORIDE 20 MG/1
40 CAPSULE ORAL DAILY
Status: DISCONTINUED | OUTPATIENT
Start: 2022-05-07 | End: 2022-05-11 | Stop reason: HOSPADM

## 2022-05-06 RX ORDER — MECLIZINE HCL 12.5 MG/1
12.5 TABLET ORAL EVERY 8 HOURS PRN
Status: DISCONTINUED | OUTPATIENT
Start: 2022-05-06 | End: 2022-05-11 | Stop reason: HOSPADM

## 2022-05-06 RX ORDER — CLONAZEPAM 0.5 MG/1
0.25 TABLET ORAL 2 TIMES DAILY
Status: COMPLETED | OUTPATIENT
Start: 2022-05-06 | End: 2022-05-08

## 2022-05-06 RX ADMIN — DOXYCYCLINE 100 MG: 100 CAPSULE ORAL at 09:08

## 2022-05-06 RX ADMIN — CLONAZEPAM 0.25 MG: 0.5 TABLET ORAL at 17:48

## 2022-05-06 RX ADMIN — DOXYCYCLINE 100 MG: 100 CAPSULE ORAL at 20:43

## 2022-05-06 RX ADMIN — LAMOTRIGINE 25 MG: 25 TABLET ORAL at 20:43

## 2022-05-06 RX ADMIN — Medication 3 MG: at 20:43

## 2022-05-06 RX ADMIN — TRAZODONE HYDROCHLORIDE 100 MG: 50 TABLET ORAL at 20:43

## 2022-05-06 RX ADMIN — FLUOXETINE 20 MG: 20 CAPSULE ORAL at 09:08

## 2022-05-06 RX ADMIN — CLONAZEPAM 0.25 MG: 0.5 TABLET ORAL at 11:30

## 2022-05-06 RX ADMIN — HYDROXYZINE HYDROCHLORIDE 50 MG: 50 TABLET, FILM COATED ORAL at 13:02

## 2022-05-06 NOTE — PROGRESS NOTES
Progress Note - [address] 37 y o  female MRN: [id number]   Unit/Bed#: -02 Encounter: [id number]    Behavior over the last 24 hours: unchanged  Kianna Chen seen today, per staff report has been withdrawn on the unit  Patient seen today lying in bed, was aroused and has to come to this provider's office  Patient was cooperative with speaking with this provider in their office  She continued to endorse symptoms of depression, anxiety and difficulty coping with her stressors  She reported ongoing feelings of hopelessness to some degree  She admitted to having made statements of wish for death  She currently denied any active suicidal or homicidal ideation  There is no evidence of psychosis or steve  A few minutes after being seen patient had a syncopal episode in the hallway  She had collapsed to the floor  She was unconscious for about 2 minutes  Questionable seizure-like movement was noted by nursing staff  Patient did not lose control of bowel or bladder, she was examined by this provider and did not have confusion upon arousal was able to communicate clearly  Her pupils were equal and reactive  She had movement of all extremities  Labs, EKG were ordered  Patient gave history of little food and water intake over the last 36 hours        Mental Status Evaluation:    Appearance:  Tall thin female, with marginal grooming, cooperative in interview   Behavior:  cooperative   Speech:  normal rate, normal volume, increased latency of response   Mood:  depressed   Affect:  blunted, Constricted and sluggish   Thought Process:  logical   Associations: intact associations   Thought Content:  no overt delusions   Perceptual Disturbances: no auditory hallucinations, no visual hallucinations   Risk Potential: Suicidal ideation - None at present  Homicidal ideation - None at present   Sensorium:  oriented to person, place and time/date   Memory:  recent and remote memory grossly intact   Consciousness:  alert and awake   Attention: attention span and concentration are age appropriate   Insight:  limited   Judgment: limited   Gait/Station: normal gait/station   Motor Activity: no abnormal movements     Vital signs in last 24 hours:    Temp:  [98 2 °F (36 8 °C)-98 4 °F (36 9 °C)] 98 4 °F (36 9 °C)  HR:  [66-81] 80  Resp:  [16-18] 18  BP: (100-105)/(62-69) 100/69     Physical exam  Pupils equal and reactive  Cardiac- normal S1-S2 no murmurs or extra heart sounds  Lungs- good air entry no rales or crackles  Neurologic- alert awake and oriented x3, moving all 4 extremities, no weakness      Laboratory results: I have personally reviewed all pertinent laboratory/tests results  Assessment/Plan   Principal Problem:    Severe episode of recurrent major depressive disorder (HCC)  Active Problems:    Substance use    Recommended Treatment:     Planned medication and treatment changes:  Patient case discussed with medicine  EKG reviewed without any abnormalities  Labs will be followed up  Patient appears to have had a syncopal episode likely secondary to dehydration and poor oral intake  Medicine will continue to follow  In terms of her psychiatric care patient will have an increase in her Prozac to 40 mg daily    Continue Lamictal 25 mg daily    All current active medications have been reviewed  Encourage group therapy, milieu therapy and occupational therapy  Behavioral Health checks every 7 minutes  Current Facility-Administered Medications   Medication Dose Route Frequency Provider Last Rate    acetaminophen  650 mg Oral Q6H PRN [facility] Medei, CRNP      acetaminophen  650 mg Oral Q4H PRN [facility] Medei, CRNP      acetaminophen  975 mg Oral Q6H PRN [facility] Medei, CRNP      aluminum-magnesium hydroxide-simethicone  30 mL Oral Q4H PRN [facility] Medei, NACHO      bacitracin  1 small application Topical BID Rika Garcia MD      benztropine  1 mg Oral Q6H PRN NACHO Dick      clonazePAM  0 25 mg Oral BID [facility] NACHO NICOLE      [START ON 5/9/2022] clonazePAM  0 25 mg Oral Daily Yanick Abimael Medtwan, NACHO      doxycycline hyclate  100 mg Oral Q12H [address] [place] Almendarez MD      fluconazole  150 mg Oral Once Stephanie Dial PA-C      [START ON 5/7/2022] FLUoxetine  40 mg Oral Daily Yana Dow MD      hydrOXYzine HCL  25 mg Oral Q6H PRN Max 4/day [facility] Medtwan, NACHO      hydrOXYzine HCL  50 mg Oral Q4H PRN Max 4/day Yanick Abimael Medtwan, NACHO      Or    LORazepam  1 mg Intramuscular Q4H PRN Yanick Abimael Medei, NACHO      lamoTRIgine  25 mg Oral HS Jose Prescott DO      LORazepam  1 mg Oral Q6H PRN [facility]      Or    LORazepam  2 mg Intramuscular Q6H PRN Max 3/day Yanick [place] Medtwan, NACHO      meclizine  12 5 mg Oral Q8H PRN Stephanie Dial PA-C      melatonin  3 mg Oral HS Yanick Abimael Medei, NACHO      nicotine  1 patch Transdermal Daily [place] Almendarez MD      OLANZapine  10 mg Oral Q3H PRN Max 3/day [facility] Medei, NACHO      Or    OLANZapine  10 mg Intramuscular Q3H PRN Max 3/day [facility] Medei, NACHO      OLANZapine  5 mg Oral Q3H PRN Max 6/day Yanick [place] Medei, NACHO      Or    OLANZapine  5 mg Intramuscular Q3H PRN Max 6/day [facility] Tia, NACHO      OLANZapine  2 5 mg Oral Q3H PRN Max 8/day [facility] Tia, NACHO      [START ON 5/8/2022] permethrin   Topical Once Ventura Almendarez MD      polyethylene glycol  17 g Oral Daily [facility]N [facility], NACHO      traZODone  100 mg Oral HS PRN [facility] Medei, NACHO         Risks / Benefits of Treatment:    Risks, benefits, and possible side effects of medications explained to patient and patient verbalizes understanding and agreement for treatment  Counseling / Coordination of Care: Total floor / unit time spent today 50 minutes  Greater than 50% of total time was spent with the patient and / or family counseling and / or coordination of care   A description of counseling / coordination of care:  Patient's progress discussed with staff in treatment team meeting  Medications, treatment progress and treatment plan reviewed with patient     Examining patient upon syncopal episode, discussing the case with medicine team    Yana Dow MD 05/06/22

## 2022-05-06 NOTE — NURSING NOTE
Patient increased visibility in unit  Socializing with selective peers  Patient denies SI HI AVH  Patient appearance improved  Speech clean and good eye contact  Patient endorse depression with some improvement and reports anxiety due to over stimulation of unit  Orders obtained for diflucan 150mg po for yeast infection  Second tx for scabies with permethrin cream is scheduled for 5/8/22  Patient medicated at 2133 with ativan 1 mg and trazodone 100mg at 2322  Review of patients labs TSH 0 200 placed on MD's list  Will continue to monitor and provide support  Q 7 minute safety and behavioral checks maintained

## 2022-05-06 NOTE — PROGRESS NOTES
Progress Note - Viviana Howard 37 y o  female MRN: [id number]    Unit/Bed#: [facility] 220-02 Encounter: [id number]        Subjective:   Patient seen and examined at bedside after reviewing the chart and discussing the case with the caring staff  It was reported that patient was walking in the hallway and had a witnessed syncopal episode  Rapid response was called  Staff noted possible seizure-like activity  Jerking movements were reported  No loss of bowel or bladder control, no tongue biting  When patient awoke, there was no reported confusion, pupils were dilated  No injuries noted  Blood sugar 144 mg/dL, /69 mmHg  Patient examined at bedside  Patient states that she was able to recall what occurred just prior to episode  States she was walking and felt dizzy, tried to lean against the wall and does not remember what occurred after that  Patient admits to poor food and fluid intake since admission  Also reports similar episode 3 times in the past due to this  Currently, patient states she feels better but does still feel dizzy when moving head, states she feels tired  Physical Exam   Vitals: Blood pressure 100/69, pulse 80, temperature 98 4 °F (36 9 °C), temperature source Temporal, resp  rate 18, height 5' 7" (1 702 m), weight 56 7 kg (125 lb), SpO2 98 %  ,Body mass index is 19 58 kg/m²  Constitutional: Patient in no acute distress, laying in bed  Head:  Normocephalic, atraumatic  Eyes: PERRL, pupils 3 mm bilaterally, no nystagmus, EOMI   Neck: Supple, no nuchal rigidity  Cardiovascular: Normal rate and regular rhythm  Pulmonary/Chest: Effort normal and breath sounds normal    Abdomen: Soft, + BS, NT  Neurological: Awake, alert, oriented  Speech clear  Face symmetric with normal eye closure and smile  No pronator drift, no dysmetria on finger to nose  No motor deficits noted, muscle strength 5/5 bilaterally, sensation is intact bilaterally   Gait unable to be examined as patient wants to stay in bed  Musculoskeletal:  Normal range of motion  Skin: Skin is warm and dry  No diaphoresis  No pallor  Assessment/Plan:  Fide Murphy is a(n) 37y o  year old female with MDD      1  History of nicotine dependence  Patient is on nicotine transdermal patch 21 mg daily  2  History of Lyme disease  Patient is currently on doxycycline 100 mg twice daily  3  Recent scabies  Patient received Elimite cream treatment on 05/02/2022  Repeat the treatment with the Elimite 5% cream on 05/08/2022   4  Open wounds over the face and extremity  Bacitracin ointment twice daily  5  Insomnia  Patient is on melatonin at bedtime along with trazodone as needed  6  Arthritis/headache  Patient may get Tylenol on as needed basis  7  Low TSH  Will repeat TFTs  8  Syncopal episode  No loss of bowel or bladder control, no tongue biting  No postictal state  Likely from patient's poor food/fluid intake  CBC, CMP normal   Continue to monitor closely  The patient was discussed with Dr Marquis Sheffield and he is in agreement with the above note

## 2022-05-06 NOTE — NUTRITION
05/06/22 1541   Biochemical Data,Medical Tests, and Procedures   Biochemical Data/Medical Tests/Procedures Lab values reviewed; Meds reviewed   Labs (Comment) 5/6 CMP WNL, CBC WNL, TSH:0 200  5/5 lipids WNL   Meds (Comment) cogentin, klonopin, vibramycin, prozac, melatonin, meclizine, zyprexa, desyrel   Nutrition-Focused Physical Exam   Nutrition-Focused Physical Exam Findings RN skin assessment reviewed   Nutrition-Focused Physical Exam Findings reports some difficulty chewing due to removal of upper tooth however states she knows what foods she can chew   Medical-Related Concerns no PMH on file   Adequacy of Intake   Nutrition Modality PO   Feeding Route   PO Independent   Current PO Intake   Current Diet Order Regular, finger foods diet  Current Meal Intake 0-25%;25-50%;50-75%;%   Estimated calorie intake compared to estimated need Meal intakes vary, nutrient needs not meet at this time  PES Statement   Problem Intake   Energy Balance (1) Inadequate energy intake NI-1 2   Related to Depression   As evidenced by: Intake < estimated needs   Recommendations/Interventions   Malnutrition/BMI Present No  (does not meet criteria at this time; will monitor)   Summary Trigger: poor PO intake  Regular diet, finger foods  Wheat bran allergy listed  Meal intakes have been varying  Patient states her appetite is better today  She states her appetite is always "iffy"  She states at home she does not eat solid meals but often "picks throughout the day"  She states she enjoys foods like fruits, vegetables, bananas, peanut butter, cereal, granola and string cheese  She states she is not allowed many food options due to wheat being restricted  She states she does not have true allergy to wheat, she just occasionally gets a rash if she consumes too much wheat  She states she knows what she can and cannot tolerate  Discussed possibility of removing wheat bran allergy PA-C due to limited selections   Also discussed supplements she is agreeable to chocolate ensure compact BID  5/4/#  Reports usual weight is 125-130#  Recent scabies, skin integrity reviewed  Syncopal episode today     Interventions/Recommendations Continue current diet order;Supplement initiate   Intervention Comments add chocolate ensure compact BID   Recommendations to Provider consider removing wheat allergy   Education Assessment   Education Education not indicated at this time   Patient Nutrition Goals   Goal Avoid weight loss;Meet PO needs   Goal Status Initiated   Timeframe to complete goal by next f/u   Nutrition Complexity Risk   Nutrition complexity level High risk   Follow up date 05/12/22

## 2022-05-06 NOTE — PROGRESS NOTES
05/06/22 0830   Team Meeting   Meeting Type Daily Rounds   Team Members Present   Team Members Present Physician;Nurse;   Physician Team Member Dr Vinny MD; Darron Mata [place]   Nursing Team Member Orlando Duque RN   Care Management Team Member MS Ashok, [facility], [facility]   Patient/Family Present   Patient Present No   Patient's Family Present No   Prn for anxiety, effective, 2nd tx 5/8, f/u medical tsh, minimal in conversation, medication compliant, medication adjustment

## 2022-05-06 NOTE — PLAN OF CARE
Problem: Alteration in Thoughts and Perception  Goal: Complete daily ADLs, including personal hygiene independently, as able  Description: Interventions:  - Observe, teach, and assist patient with ADLS  - Monitor and promote a balance of rest/activity, with adequate nutrition and elimination   Outcome: Progressing     Problem: Ineffective Coping  Goal: Identifies ineffective coping skills  Outcome: Progressing  Goal: Identifies healthy coping skills  Outcome: Progressing  Goal: Demonstrates healthy coping skills  Outcome: Progressing  Goal: Participates in unit activities  Description: Interventions:  - Provide therapeutic environment   - Provide required programming   - Redirect inappropriate behaviors   Outcome: Progressing  Goal: Patient/Family participate in treatment and DC plans  Description: Interventions:  - Provide therapeutic environment  Outcome: Progressing  Goal: Patient/Family verbalizes awareness of resources  Outcome: Progressing  Goal: Understands least restrictive measures  Description: Interventions:  - Utilize least restrictive behavior  Outcome: Progressing  Goal: Free from restraint events  Description: - Utilize least restrictive measures   - Provide behavioral interventions   - Redirect inappropriate behaviors   Outcome: Progressing     Problem: Risk for Self Injury/Neglect  Goal: Treatment Goal: Remain safe during length of stay, learn and adopt new coping skills, and be free of self-injurious ideation, impulses and acts at the time of discharge  Outcome: Progressing  Goal: Verbalize thoughts and feelings  Description: Interventions:  - Assess and re-assess patient's lethality and potential for self-injury  - Engage patient in 1:1 interactions, daily, for a minimum of 15 minutes  - Encourage patient to express feelings, fears, frustrations, hopes  - Establish rapport/trust with patient   Outcome: Progressing  Goal: Refrain from harming self  Description: Interventions:  - Monitor patient closely, per order  - Develop a trusting relationship  - Supervise medication ingestion, monitor effects and side effects   Outcome: Progressing  Goal: Recognize maladaptive responses and adopt new coping mechanisms  Outcome: Progressing  Goal: Complete daily ADLs, including personal hygiene independently, as able  Description: Interventions:  - Observe, teach, and assist patient with ADLS  - Monitor and promote a balance of rest/activity, with adequate nutrition and elimination  Outcome: Progressing     Problem: Depression  Goal: Treatment Goal: Demonstrate behavioral control of depressive symptoms, verbalize feelings of improved mood/affect, and adopt new coping skills prior to discharge  Outcome: Progressing  Goal: Verbalize thoughts and feelings  Description: Interventions:  - Assess and re-assess patient's level of risk   - Engage patient in 1:1 interactions, daily, for a minimum of 15 minutes   - Encourage patient to express feelings, fears, frustrations, hopes   Outcome: Progressing  Goal: Refrain from harming self  Description: Interventions:  - Monitor patient closely, per order   - Supervise medication ingestion, monitor effects and side effects   Outcome: Progressing  Goal: Refrain from self-neglect  Outcome: Progressing  Goal: Attend and participate in unit activities, including therapeutic, recreational, and educational groups  Description: Interventions:  - Provide therapeutic and educational activities daily, encourage attendance and participation, and document same in the medical record   Outcome: Progressing  Goal: Complete daily ADLs, including personal hygiene independently, as able  Description: Interventions:  - Observe, teach, and assist patient with ADLS  -  Monitor and promote a balance of rest/activity, with adequate nutrition and elimination   Outcome: Progressing     Problem: Anxiety  Goal: Anxiety is at manageable level  Description: Interventions:  - Assess and monitor patient's anxiety level    - Monitor for signs and symptoms (heart palpitations, chest pain, shortness of breath, headaches, nausea, feeling jumpy, restlessness, irritable, apprehensive)  - Collaborate with interdisciplinary team and initiate plan and interventions as ordered    - [place] patient to unit/surroundings  - Explain treatment plan  - Encourage participation in care  - Encourage verbalization of concerns/fears  - Identify coping mechanisms  - Assist in developing anxiety-reducing skills  - Administer/offer alternative therapies  - Limit or eliminate stimulants  Outcome: Progressing     Problem: Risk for Violence/Aggression Toward Others  Goal: Treatment Goal: Refrain from acts of violence/aggression during length of stay, and demonstrate improved impulse control at the time of discharge  Outcome: Progressing  Goal: Verbalize thoughts and feelings  Description: Interventions:  - Assess and re-assess patient's level of risk, every waking shift  - Engage patient in 1:1 interactions, daily, for a minimum of 15 minutes   - Allow patient to express feelings and frustrations in a safe and non-threatening manner   - Establish rapport/trust with patient   Outcome: Progressing  Goal: Refrain from harming others  Outcome: Progressing  Goal: Refrain from destructive acts on the environment or property  Outcome: Progressing  Goal: Control angry outbursts  Description: Interventions:  - Monitor patient closely, per order  - Ensure early verbal de-escalation  - Monitor prn medication needs  - Set reasonable/therapeutic limits, outline behavioral expectations, and consequences   - Provide a non-threatening milieu, utilizing the least restrictive interventions   Outcome: Progressing  Goal: Attend and participate in unit activities, including therapeutic, recreational, and educational groups  Description: Interventions:  - Provide therapeutic and educational activities daily, encourage attendance and participation, and document same in the medical record   Outcome: Progressing  Goal: Identify appropriate positive anger management techniques  Description: Interventions:  - Offer anger management and coping skills groups   - Staff will provide positive feedback for appropriate anger control  Outcome: Progressing     Problem: Nutrition/Hydration-ADULT  Goal: Nutrient/Hydration intake appropriate for improving, restoring or maintaining nutritional needs  Description: Monitor and assess patient's nutrition/hydration status for malnutrition  Collaborate with interdisciplinary team and initiate plan and interventions as ordered  Monitor patient's weight and dietary intake as ordered or per policy  Utilize nutrition screening tool and intervene as necessary  Determine patient's food preferences and provide high-protein, high-caloric foods as appropriate       INTERVENTIONS:  - Monitor oral intake, urinary output, labs, and treatment plans  - Assess nutrition and hydration status and recommend course of action  - Evaluate amount of meals eaten  - Assist patient with eating if necessary   - Allow adequate time for meals  - Recommend/ encourage appropriate diets, oral nutritional supplements, and vitamin/mineral supplements  - Order, calculate, and assess calorie counts as needed  - Recommend, monitor, and adjust tube feedings and TPN/PPN based on assessed needs  - Assess need for intravenous fluids  - Provide specific nutrition/hydration education as appropriate  - Include patient/family/caregiver in decisions related to nutrition  Outcome: Progressing

## 2022-05-06 NOTE — NURSING NOTE
Pt was withdrawn to her room  At  the patient was walking to the phones when she had a witnessed fall  A rapid response was called  The provider was present  EKG and labs were drawn  Pt laid down  Pt reported feeling less dizzy after she took a nap  Pt denied SI/HI/AVH nut endorses anxiety and depression and would like to seek further treatment after discharge  Pt showered and brushed her teeth  Q 7 minute safety checks were maintained

## 2022-05-07 PROCEDURE — 99232 SBSQ HOSP IP/OBS MODERATE 35: CPT

## 2022-05-07 RX ADMIN — CLONAZEPAM 0.25 MG: 0.5 TABLET ORAL at 17:02

## 2022-05-07 RX ADMIN — DOXYCYCLINE 100 MG: 100 CAPSULE ORAL at 20:53

## 2022-05-07 RX ADMIN — FLUOXETINE 40 MG: 20 CAPSULE ORAL at 08:47

## 2022-05-07 RX ADMIN — LAMOTRIGINE 25 MG: 25 TABLET ORAL at 20:53

## 2022-05-07 RX ADMIN — NICOTINE 1 PATCH: 21 PATCH, EXTENDED RELEASE TRANSDERMAL at 08:47

## 2022-05-07 RX ADMIN — Medication 3 MG: at 20:52

## 2022-05-07 RX ADMIN — DOXYCYCLINE 100 MG: 100 CAPSULE ORAL at 08:47

## 2022-05-07 RX ADMIN — CLONAZEPAM 0.25 MG: 0.5 TABLET ORAL at 08:47

## 2022-05-07 RX ADMIN — HYDROXYZINE HYDROCHLORIDE 50 MG: 50 TABLET, FILM COATED ORAL at 20:53

## 2022-05-07 RX ADMIN — NICOTINE POLACRILEX 2 MG: 2 GUM, CHEWING BUCCAL at 18:32

## 2022-05-07 RX ADMIN — BACITRACIN 1 SMALL APPLICATION: 500 OINTMENT TOPICAL at 17:02

## 2022-05-07 NOTE — NURSING NOTE
Patient visible on the unit but mainly withdrawn to her room, appropriate affect, pleasant , calm and cooperative  Amy denies most needs except her night medications, she also expressed having a nicotine craving , order for nicorette gum was obtained, nicotine patch removed at bedtime   Amy denies suicidal ideation, A/V/H, Amy  was observed walking the halls periodically , social with staff and selective peers  no acute behaviors  Patient good with  Medication, fluid and nourishment

## 2022-05-07 NOTE — NURSING NOTE
Patient was quiet and cooperative  Patient guarded and withdrawn  Staff support provided  Patient compliant with medications  Patient denies SI/HI  Q 7 minute safety checks maintained  Patient remained isolative and withdrawn through out the shift  Staff will continue to monitor and support

## 2022-05-07 NOTE — PROGRESS NOTES
Progress Note - Haris Kohli 37 y o  female MRN: [id number]    Unit/Bed#: [facility] 220-02 Encounter: [id number]        Subjective:   Patient seen and examined at bedside after reviewing the chart and discussing the case with the caring staff  Patient examined at bedside  Currently lying in bed  Patient reports feeling better than yesterday  She states she is trying to eat and drink more fluids  Patient has no acute complaints  Physical Exam   Vitals: Blood pressure (!) 89/52, pulse 71, temperature 97 9 °F (36 6 °C), temperature source Temporal, resp  rate 18, height 5' 7" (1 702 m), weight 56 7 kg (125 lb), SpO2 98 %  ,Body mass index is 19 58 kg/m²  Constitutional: Patient in no acute distress, lying in bed  Head:  Normocephalic, atraumatic  Eyes: PERRL, EOMI  Neck: Supple  Cardiovascular: Normal rate and regular rhythm  Pulmonary/Chest: Effort normal and breath sounds normal    Abdomen: Soft, + BS, NT  Neurological: Awake, alert, oriented  Speech clear  No motor deficits noted, sensation is intact bilaterally  Musculoskeletal:  Normal range of motion  Skin: Skin is warm and dry  No diaphoresis  No pallor  Assessment/Plan:  Haris Kohli is a(n) 37y o  year old female with MDD      1  History of nicotine dependence  Patient is on nicotine transdermal patch 21 mg daily  2  History of Lyme disease  Patient is currently on doxycycline 100 mg twice daily  3  Recent scabies  Patient received Elimite cream treatment on 05/02/2022  Repeat the treatment with the Elimite 5% cream on 05/08/2022   4  Open wounds over the face and extremity  Bacitracin ointment twice daily  5  Insomnia  Patient is on melatonin at bedtime along with trazodone as needed  6  Arthritis/headache  Patient may get Tylenol on as needed basis  7  Low TSH  Will repeat TFTs  8  Syncopal episode  No loss of bowel or bladder control, no tongue biting  No postictal state    Encouraged patient to increase food/fluid intake, which she states she is trying to do  Continue to monitor closely  The patient was discussed with Dr Mireya Rosario and he is in agreement with the above note

## 2022-05-07 NOTE — PLAN OF CARE
Problem: Alteration in Thoughts and Perception  Goal: Treatment Goal: Gain control of psychotic behaviors/thinking, reduce/eliminate presenting symptoms and demonstrate improved reality functioning upon discharge  Outcome: Progressing  Goal: Verbalize thoughts and feelings  Description: Interventions:  - Promote a nonjudgmental and trusting relationship with the patient through active listening and therapeutic communication  - Assess patient's level of functioning, behavior and potential for risk  - Engage patient in 1 on 1 interactions  - Encourage patient to express fears, feelings, frustrations, and discuss symptoms    - [place] patient to reality, help patient recognize reality-based thinking   - Administer medications as ordered and assess for potential side effects  - Provide the patient education related to the signs and symptoms of the illness and desired effects of prescribed medications  Outcome: Progressing  Goal: Refrain from acting on delusional thinking/internal stimuli  Description: Interventions:  - Monitor patient closely, per order   - Utilize least restrictive measures   - Set reasonable limits, give positive feedback for acceptable   - Administer medications as ordered and monitor of potential side effects  Outcome: Progressing  Goal: Agree to be compliant with medication regime, as prescribed and report medication side effects  Description: Interventions:  - Offer appropriate PRN medication and supervise ingestion; conduct AIMS, as needed   Outcome: Progressing  Goal: Attend and participate in unit activities, including therapeutic, recreational, and educational groups  Description: Interventions:  -Encourage Visitation and family involvement in care  Outcome: Progressing  Goal: Recognize dysfunctional thoughts, communicate reality-based thoughts at the time of discharge  Description: Interventions:  - Provide medication and psycho-education to assist patient in compliance and developing insight into his/her illness   Outcome: Progressing  Goal: Complete daily ADLs, including personal hygiene independently, as able  Description: Interventions:  - Observe, teach, and assist patient with ADLS  - Monitor and promote a balance of rest/activity, with adequate nutrition and elimination   Outcome: Progressing     Problem: Ineffective Coping  Goal: Identifies ineffective coping skills  Outcome: Progressing  Goal: Identifies healthy coping skills  Outcome: Progressing  Goal: Demonstrates healthy coping skills  Outcome: Progressing  Goal: Participates in unit activities  Description: Interventions:  - Provide therapeutic environment   - Provide required programming   - Redirect inappropriate behaviors   Outcome: Progressing  Goal: Patient/Family participate in treatment and DC plans  Description: Interventions:  - Provide therapeutic environment  Outcome: Progressing  Goal: Patient/Family verbalizes awareness of resources  Outcome: Progressing  Goal: Understands least restrictive measures  Description: Interventions:  - Utilize least restrictive behavior  Outcome: Progressing  Goal: Free from restraint events  Description: - Utilize least restrictive measures   - Provide behavioral interventions   - Redirect inappropriate behaviors   Outcome: Progressing     Problem: Risk for Self Injury/Neglect  Goal: Treatment Goal: Remain safe during length of stay, learn and adopt new coping skills, and be free of self-injurious ideation, impulses and acts at the time of discharge  Outcome: Progressing  Goal: Verbalize thoughts and feelings  Description: Interventions:  - Assess and re-assess patient's lethality and potential for self-injury  - Engage patient in 1:1 interactions, daily, for a minimum of 15 minutes  - Encourage patient to express feelings, fears, frustrations, hopes  - Establish rapport/trust with patient   Outcome: Progressing  Goal: Refrain from harming self  Description: Interventions:  - Monitor patient closely, per order  - Develop a trusting relationship  - Supervise medication ingestion, monitor effects and side effects   Outcome: Progressing  Goal: Attend and participate in unit activities, including therapeutic, recreational, and educational groups  Description: Interventions:  - Provide therapeutic and educational activities daily, encourage attendance and participation, and document same in the medical record  - Obtain collateral information, encourage visitation and family involvement in care   Outcome: Progressing  Goal: Recognize maladaptive responses and adopt new coping mechanisms  Outcome: Progressing  Goal: Complete daily ADLs, including personal hygiene independently, as able  Description: Interventions:  - Observe, teach, and assist patient with ADLS  - Monitor and promote a balance of rest/activity, with adequate nutrition and elimination  Outcome: Progressing     Problem: Depression  Goal: Treatment Goal: Demonstrate behavioral control of depressive symptoms, verbalize feelings of improved mood/affect, and adopt new coping skills prior to discharge  Outcome: Progressing  Goal: Verbalize thoughts and feelings  Description: Interventions:  - Assess and re-assess patient's level of risk   - Engage patient in 1:1 interactions, daily, for a minimum of 15 minutes   - Encourage patient to express feelings, fears, frustrations, hopes   Outcome: Progressing  Goal: Refrain from harming self  Description: Interventions:  - Monitor patient closely, per order   - Supervise medication ingestion, monitor effects and side effects   Outcome: Progressing  Goal: Refrain from isolation  Description: Interventions:  - Develop a trusting relationship   - Encourage socialization   Outcome: Progressing  Goal: Refrain from self-neglect  Outcome: Progressing  Goal: Attend and participate in unit activities, including therapeutic, recreational, and educational groups  Description: Interventions:  - Provide therapeutic and educational activities daily, encourage attendance and participation, and document same in the medical record   Outcome: Progressing  Goal: Complete daily ADLs, including personal hygiene independently, as able  Description: Interventions:  - Observe, teach, and assist patient with ADLS  -  Monitor and promote a balance of rest/activity, with adequate nutrition and elimination   Outcome: Progressing     Problem: Anxiety  Goal: Anxiety is at manageable level  Description: Interventions:  - Assess and monitor patient's anxiety level  - Monitor for signs and symptoms (heart palpitations, chest pain, shortness of breath, headaches, nausea, feeling jumpy, restlessness, irritable, apprehensive)  - Collaborate with interdisciplinary team and initiate plan and interventions as ordered    - [place] patient to unit/surroundings  - Explain treatment plan  - Encourage participation in care  - Encourage verbalization of concerns/fears  - Identify coping mechanisms  - Assist in developing anxiety-reducing skills  - Administer/offer alternative therapies  - Limit or eliminate stimulants  Outcome: Progressing     Problem: Risk for Violence/Aggression Toward Others  Goal: Treatment Goal: Refrain from acts of violence/aggression during length of stay, and demonstrate improved impulse control at the time of discharge  Outcome: Progressing  Goal: Verbalize thoughts and feelings  Description: Interventions:  - Assess and re-assess patient's level of risk, every waking shift  - Engage patient in 1:1 interactions, daily, for a minimum of 15 minutes   - Allow patient to express feelings and frustrations in a safe and non-threatening manner   - Establish rapport/trust with patient   Outcome: Progressing  Goal: Refrain from harming others  Outcome: Progressing  Goal: Refrain from destructive acts on the environment or property  Outcome: Progressing  Goal: Control angry outbursts  Description: Interventions:  - Monitor patient closely, per order  - Ensure early verbal de-escalation  - Monitor prn medication needs  - Set reasonable/therapeutic limits, outline behavioral expectations, and consequences   - Provide a non-threatening milieu, utilizing the least restrictive interventions   Outcome: Progressing  Goal: Attend and participate in unit activities, including therapeutic, recreational, and educational groups  Description: Interventions:  - Provide therapeutic and educational activities daily, encourage attendance and participation, and document same in the medical record   Outcome: Progressing  Goal: Identify appropriate positive anger management techniques  Description: Interventions:  - Offer anger management and coping skills groups   - Staff will provide positive feedback for appropriate anger control  Outcome: Progressing     Problem: Ineffective Coping  Goal: Participates in unit activities  Description: Interventions:  - Provide therapeutic environment   - Provide required programming   - Redirect inappropriate behaviors   Outcome: Progressing     Problem: Nutrition/Hydration-ADULT  Goal: Nutrient/Hydration intake appropriate for improving, restoring or maintaining nutritional needs  Description: Monitor and assess patient's nutrition/hydration status for malnutrition  Collaborate with interdisciplinary team and initiate plan and interventions as ordered  Monitor patient's weight and dietary intake as ordered or per policy  Utilize nutrition screening tool and intervene as necessary  Determine patient's food preferences and provide high-protein, high-caloric foods as appropriate       INTERVENTIONS:  - Monitor oral intake, urinary output, labs, and treatment plans  - Assess nutrition and hydration status and recommend course of action  - Evaluate amount of meals eaten  - Assist patient with eating if necessary   - Allow adequate time for meals  - Recommend/ encourage appropriate diets, oral nutritional supplements, and vitamin/mineral supplements  - Order, calculate, and assess calorie counts as needed  - Recommend, monitor, and adjust tube feedings and TPN/PPN based on assessed needs  - Assess need for intravenous fluids  - Provide specific nutrition/hydration education as appropriate  - Include patient/family/caregiver in decisions related to nutrition  Outcome: Progressing     Problem: DISCHARGE PLANNING - CARE MANAGEMENT  Goal: Discharge to post-acute care or home with appropriate resources  Description: INTERVENTIONS:  - Conduct assessment to determine patient/family and health care team treatment goals, and need for post-acute services based on payer coverage, community resources, and patient preferences, and barriers to discharge  - Address psychosocial, clinical, and financial barriers to discharge as identified in assessment in conjunction with the patient/family and health care team  - Arrange appropriate level of post-acute services according to patients   needs and preference and payer coverage in collaboration with the physician and health care team  - Communicate with and update the patient/family, physician, and health care team regarding progress on the discharge plan  - Arrange appropriate transportation to post-acute venues  Outcome: Progressing

## 2022-05-07 NOTE — PROGRESS NOTES
Pt ordered AM labs  Attempt x2 made without success  Labs to be attempted at a later time/date   Pt's nurse notified of unsuccessful attempts

## 2022-05-07 NOTE — PROGRESS NOTES
Progress Note - Clem Correa [age] y o  female MRN: [id number]  Unit/Bed#: [facility] 220-02 Encounter: [id number]    Assessment/Plan   Principal Problem:    Severe episode of recurrent major depressive disorder ([address] )  Active Problems:    Substance use      Subjective:Patient was seen today for continuation of care, records reviewed and  patient was discussed with the morning case review team  Patient was cooperative and scant when speaking to provider today, patient and staff report no change in the past 12 hours  When discussing her syncope episode on 05/06, she stated this has happened to her before and she simply became dizzy and fell to the floor  Encouraged adequate hydration and food intake which patient agreed to  Psychiatrically presents as scant and submissive during conversation, denies anxiety, depression, hallucinations during today's encounter  Appears flat and depressed  Tolerating Lamictal , Prozac, Klonopin with no reported side effects  Patient denies endorsing any suicidal or homicidal ideation  Remains medication compliance       Psychiatric Review of Systems:    Sleep: slept off and on  Appetite: normal  Medication side effects: No   ROS: all other systems are negative    Vitals:  Vitals:    05/07/22 0723   BP: (!) 89/52   Pulse: 71   Resp: 18   Temp: 97 9 °F (36 6 °C)   SpO2: 98%       Mental Status Evaluation:    Appearance:  casually dressed   Behavior:  pleasant, cooperative   Speech:  normal rate and volume   Mood:  depressed   Affect:  constricted   Thought Process:  goal directed   Associations: intact associations   Thought Content:  no overt delusions   Perceptual Disturbances: no auditory hallucinations   Risk Potential: Suicidal ideation - None at present  Homicidal ideation - None at present  Potential for aggression - No   Sensorium:  oriented to person, place and time/date   Memory:  recent and remote memory grossly intact   Consciousness:  alert and awake Attention: attention span and concentration appear shorter than expected for age   Insight:  limited   Judgment: limited   Gait/Station: normal gait/station   Motor Activity: no abnormal movements     Laboratory results:    I have personally reviewed all pertinent laboratory/tests results    Most Recent Labs:   Lab Results   Component Value Date    WBC 5 79 05/06/2022    RBC 4 73 05/06/2022    HGB 14 7 05/06/2022    HCT 45 6 05/06/2022     05/06/2022    RDW 11 9 05/06/2022    NEUTROABS 3 59 05/06/2022    SODIUM 138 05/06/2022    K 3 9 05/06/2022     05/06/2022    CO2 30 05/06/2022    BUN 10 05/06/2022    CREATININE 0 94 05/06/2022    GLUC 128 05/06/2022    GLUF 77 05/05/2022    CALCIUM 9 3 05/06/2022    AST 15 05/06/2022    ALT 12 05/06/2022    ALKPHOS 42 05/06/2022    TP 6 9 05/06/2022    ALB 4 2 05/06/2022    TBILI 0 49 05/06/2022    CHOLESTEROL 141 05/05/2022    HDL 58 05/05/2022    TRIG 53 05/05/2022    LDLCALC 72 05/05/2022    NONHDLC 83 05/05/2022    KCX2GKHDMKYN 0 200 (L) 05/05/2022    PREGUR negative 05/02/2022       Progress Toward Goals:     Progressing    Recommended Treatment:     All current active medications have been reviewed  Encourage group therapy, milieu therapy and occupational therapy  Continue treatment with group therapy, milieu therapy and occupational therapy  Behavioral Health checks every 7 minutes  Continue current medications:  Current Facility-Administered Medications   Medication Dose Route Frequency Provider Last Rate    acetaminophen  650 mg Oral Q6H PRN Fatmata Sep Medei, CRNP      acetaminophen  650 mg Oral Q4H PRN Fatmata Sep Medei, CRNP      acetaminophen  975 mg Oral Q6H PRN Fatmata Sep Medei, CRNP      aluminum-magnesium hydroxide-simethicone  30 mL Oral Q4H PRN Fatmata Sep Medei, NACHO      bacitracin  1 small application Topical BID Onel Bland MD      benztropine  1 mg Oral Q6H PRN Fatmata Sep Medei, CRNP      clonazePAM  0 25 mg Oral BID NACHO Tamez      [START ON 5/9/2022] clonazePAM  0 25 mg Oral Daily NACHO Lucero      doxycycline hyclate  100 mg Oral Q12H [address] Adam Tamayo MD      fluconazole  150 mg Oral Once Stephanie Dial PA-C      FLUoxetine  40 mg Oral Daily Ester Ho MD      hydrOXYzine HCL  25 mg Oral Q6H PRN Max 4/day Hope Veronica Weber, NACHO      hydrOXYzine HCL  50 mg Oral Q4H PRN Max 4/day Hope NACHO Tobar      Or    LORazepam  1 mg Intramuscular Q4H PRN Hope Veronica Weber, NACHO      lamoTRIgine  25 mg Oral HS Mick Malik, DO      LORazepam  1 mg Oral Q6H PRN Hope NACHO Tobar      Or    LORazepam  2 mg Intramuscular Q6H PRN Max 3/day Lillian Weber, NACHO      meclizine  12 5 mg Oral Q8H PRN Stephanie Dial PA-C      melatonin  3 mg Oral HS NACHO Bright      nicotine  1 patch Transdermal Daily Adam Tamayo MD      nicotine polacrilex  2 mg Oral Q2H PRN Prabhakar Garcia MD      OLANZapine  10 mg Oral Q3H PRN Max 3/day Hope NACHO Tobar      Or    OLANZapine  10 mg Intramuscular Q3H PRN Max 3/day NACHO Lucero      OLANZapine  5 mg Oral Q3H PRN Max 6/day NACHO Lucero      Or    OLANZapine  5 mg Intramuscular Q3H PRN Max 6/day NACHO Bright      OLANZapine  2 5 mg Oral Q3H PRN Max 8/day NACHO Bright      [START ON 5/8/2022] permethrin   Topical Once Adam Tamayo MD      polyethylene glycol  17 g Oral Daily PRN Hope [facility] Pham      traZODone  100 mg Oral HS PRN NACHO Lucero         Risks / Benefits of Treatment:     Risks, benefits, and possible side effects of medications explained to patient and patient verbalizes understanding and agreement for treatment  Counseling / Coordination of Care:     Patient's progress reviewed with nursing staff  Medications, treatment progress and treatment plan reviewed with patient  Supportive counseling provided to the patient            NACHO Coburn

## 2022-05-07 NOTE — NURSING NOTE
Presents with blunted affect,endorses mild to moderate anxiety/depression and agrees to inform staff prior to self harm  She also agreed to ask for assistance with ambulation if she is feeling "dizzy  "She denies Sis,HIs,AH,VH ,remains withdrawn ,minimal responses during 1: 1  She requested meal in room as she c/o dizziness  In addition to documented educated we discussed ways to prevent falls/asking for assistance/reporting physical and or psychological issues;she agreed to aforementioned  Will continue to educate,monitor,and provide safe,therapeutic milieu

## 2022-05-08 LAB
T3FREE SERPL-MCNC: 1.97 PG/ML (ref 2.3–4.2)
T4 FREE SERPL-MCNC: 0.67 NG/DL (ref 0.76–1.46)
TSH SERPL DL<=0.05 MIU/L-ACNC: 1.14 UIU/ML (ref 0.45–4.5)

## 2022-05-08 PROCEDURE — 84443 ASSAY THYROID STIM HORMONE: CPT

## 2022-05-08 PROCEDURE — 84481 FREE ASSAY (FT-3): CPT

## 2022-05-08 PROCEDURE — 84439 ASSAY OF FREE THYROXINE: CPT

## 2022-05-08 PROCEDURE — 99232 SBSQ HOSP IP/OBS MODERATE 35: CPT

## 2022-05-08 RX ADMIN — BACITRACIN 1 SMALL APPLICATION: 500 OINTMENT TOPICAL at 17:02

## 2022-05-08 RX ADMIN — CLONAZEPAM 0.25 MG: 0.5 TABLET ORAL at 08:31

## 2022-05-08 RX ADMIN — LAMOTRIGINE 25 MG: 25 TABLET ORAL at 20:58

## 2022-05-08 RX ADMIN — TRAZODONE HYDROCHLORIDE 100 MG: 50 TABLET ORAL at 20:58

## 2022-05-08 RX ADMIN — DOXYCYCLINE 100 MG: 100 CAPSULE ORAL at 08:31

## 2022-05-08 RX ADMIN — PERMETHRIN: 50 CREAM TOPICAL at 08:32

## 2022-05-08 RX ADMIN — FLUOXETINE 40 MG: 20 CAPSULE ORAL at 08:31

## 2022-05-08 RX ADMIN — DOXYCYCLINE 100 MG: 100 CAPSULE ORAL at 20:58

## 2022-05-08 RX ADMIN — NICOTINE 1 PATCH: 21 PATCH, EXTENDED RELEASE TRANSDERMAL at 08:32

## 2022-05-08 RX ADMIN — Medication 3 MG: at 20:58

## 2022-05-08 NOTE — NURSING NOTE
Patient presented with moderate anxiety per HA scale,and c/o same  1:1 ineffective  Offered and accepted atarax 50 mg po,prn with education on expected therapeutics and SE to report;stated understanding  Will monitor for efficacy/SE

## 2022-05-08 NOTE — NURSING NOTE
Presents with WNL affect,upon approach;endorses mild to moderate anxiety and depression that she states is controlled  She denies SI,HI,AH,VH  She continues to spend the overwhelming majority of her free time in lieu of repeated advisements in that it is a criteria for discharge readiness  In addition to documented education we discussed s/s of impending psychological decompensation and when to seek assistance;stated understanding  Will continue to educate,monitor,and provide safe,therapeutic milieu

## 2022-05-08 NOTE — PROGRESS NOTES
Progress Note - Emanuel Mejia 37 y o  female MRN: [id number]    Unit/Bed#: [facility] 220-02 Encounter: [id number]        Subjective:   Patient seen and examined at bedside after reviewing the chart and discussing the case with the caring staff  Patient examined at bedside  Patient has no acute complaints  Repeat TSH on 05/08/2022 was WNL  Physical Exam   Vitals: Blood pressure 90/53, pulse 62, temperature 98 1 °F (36 7 °C), temperature source Temporal, resp  rate 16, height 5' 7" (1 702 m), weight 56 7 kg (125 lb), SpO2 99 %  ,Body mass index is 19 58 kg/m²  Constitutional: Patient in no acute distress, lying in bed  Head:  Normocephalic, atraumatic  Eyes: PERRL, EOMI  Neck: Supple  Cardiovascular: Normal rate and regular rhythm  Pulmonary/Chest: Effort normal and breath sounds normal    Abdomen: Soft, + BS, NT  Neurological: Awake, alert, oriented  Speech clear  No motor deficits noted, sensation is intact bilaterally  Musculoskeletal:  Normal range of motion  Skin: Skin is warm and dry  No diaphoresis  No pallor  Assessment/Plan:  Emanuel Mejia is a(n) 37y o  year old female with MDD      1  History of nicotine dependence  Patient is on nicotine transdermal patch 21 mg daily  2  History of Lyme disease  Patient is currently on doxycycline 100 mg twice daily  3  Recent scabies  Patient received Elimite cream treatment on 05/02/2022  Repeat the treatment with the Elimite 5% cream on 05/08/2022   4  Open wounds over the face and extremity  Bacitracin ointment twice daily  5  Insomnia  Patient is on melatonin at bedtime along with trazodone as needed  6  Arthritis/headache  Patient may get Tylenol on as needed basis  7  Low TSH  Repeat TSH on 05/08/2022 was WNL  8  Syncopal episode  No loss of bowel or bladder control, no tongue biting  No postictal state  Encouraged patient to increase food/fluid intake, which she states she is trying to do    Continue to monitor closely  The patient was discussed with Dr Nivia Shepherd and he is in agreement with the above note

## 2022-05-08 NOTE — PLAN OF CARE
Problem: Alteration in Thoughts and Perception  Goal: Treatment Goal: Gain control of psychotic behaviors/thinking, reduce/eliminate presenting symptoms and demonstrate improved reality functioning upon discharge  Outcome: Progressing  Goal: Verbalize thoughts and feelings  Description: Interventions:  - Promote a nonjudgmental and trusting relationship with the patient through active listening and therapeutic communication  - Assess patient's level of functioning, behavior and potential for risk  - Engage patient in 1 on 1 interactions  - Encourage patient to express fears, feelings, frustrations, and discuss symptoms    - [place] patient to reality, help patient recognize reality-based thinking   - Administer medications as ordered and assess for potential side effects  - Provide the patient education related to the signs and symptoms of the illness and desired effects of prescribed medications  Outcome: Progressing  Goal: Refrain from acting on delusional thinking/internal stimuli  Description: Interventions:  - Monitor patient closely, per order   - Utilize least restrictive measures   - Set reasonable limits, give positive feedback for acceptable   - Administer medications as ordered and monitor of potential side effects  Outcome: Progressing  Goal: Agree to be compliant with medication regime, as prescribed and report medication side effects  Description: Interventions:  - Offer appropriate PRN medication and supervise ingestion; conduct AIMS, as needed   Outcome: Progressing  Goal: Attend and participate in unit activities, including therapeutic, recreational, and educational groups  Description: Interventions:  -Encourage Visitation and family involvement in care  Outcome: Progressing  Goal: Recognize dysfunctional thoughts, communicate reality-based thoughts at the time of discharge  Description: Interventions:  - Provide medication and psycho-education to assist patient in compliance and developing insight into his/her illness   Outcome: Progressing  Goal: Complete daily ADLs, including personal hygiene independently, as able  Description: Interventions:  - Observe, teach, and assist patient with ADLS  - Monitor and promote a balance of rest/activity, with adequate nutrition and elimination   Outcome: Progressing     Problem: Ineffective Coping  Goal: Identifies ineffective coping skills  Outcome: Progressing  Goal: Identifies healthy coping skills  Outcome: Progressing  Goal: Demonstrates healthy coping skills  Outcome: Progressing  Goal: Participates in unit activities  Description: Interventions:  - Provide therapeutic environment   - Provide required programming   - Redirect inappropriate behaviors   Outcome: Progressing  Goal: Patient/Family participate in treatment and DC plans  Description: Interventions:  - Provide therapeutic environment  Outcome: Progressing  Goal: Patient/Family verbalizes awareness of resources  Outcome: Progressing  Goal: Understands least restrictive measures  Description: Interventions:  - Utilize least restrictive behavior  Outcome: Progressing  Goal: Free from restraint events  Description: - Utilize least restrictive measures   - Provide behavioral interventions   - Redirect inappropriate behaviors   Outcome: Progressing     Problem: Risk for Self Injury/Neglect  Goal: Treatment Goal: Remain safe during length of stay, learn and adopt new coping skills, and be free of self-injurious ideation, impulses and acts at the time of discharge  Outcome: Progressing  Goal: Verbalize thoughts and feelings  Description: Interventions:  - Assess and re-assess patient's lethality and potential for self-injury  - Engage patient in 1:1 interactions, daily, for a minimum of 15 minutes  - Encourage patient to express feelings, fears, frustrations, hopes  - Establish rapport/trust with patient   Outcome: Progressing  Goal: Refrain from harming self  Description: Interventions:  - Monitor patient closely, per order  - Develop a trusting relationship  - Supervise medication ingestion, monitor effects and side effects   Outcome: Progressing  Goal: Attend and participate in unit activities, including therapeutic, recreational, and educational groups  Description: Interventions:  - Provide therapeutic and educational activities daily, encourage attendance and participation, and document same in the medical record  - Obtain collateral information, encourage visitation and family involvement in care   Outcome: Progressing  Goal: Recognize maladaptive responses and adopt new coping mechanisms  Outcome: Progressing  Goal: Complete daily ADLs, including personal hygiene independently, as able  Description: Interventions:  - Observe, teach, and assist patient with ADLS  - Monitor and promote a balance of rest/activity, with adequate nutrition and elimination  Outcome: Progressing     Problem: Depression  Goal: Treatment Goal: Demonstrate behavioral control of depressive symptoms, verbalize feelings of improved mood/affect, and adopt new coping skills prior to discharge  Outcome: Progressing  Goal: Verbalize thoughts and feelings  Description: Interventions:  - Assess and re-assess patient's level of risk   - Engage patient in 1:1 interactions, daily, for a minimum of 15 minutes   - Encourage patient to express feelings, fears, frustrations, hopes   Outcome: Progressing  Goal: Refrain from harming self  Description: Interventions:  - Monitor patient closely, per order   - Supervise medication ingestion, monitor effects and side effects   Outcome: Progressing  Goal: Refrain from isolation  Description: Interventions:  - Develop a trusting relationship   - Encourage socialization   Outcome: Progressing  Goal: Refrain from self-neglect  Outcome: Progressing  Goal: Attend and participate in unit activities, including therapeutic, recreational, and educational groups  Description: Interventions:  - Provide therapeutic and educational activities daily, encourage attendance and participation, and document same in the medical record   Outcome: Progressing  Goal: Complete daily ADLs, including personal hygiene independently, as able  Description: Interventions:  - Observe, teach, and assist patient with ADLS  -  Monitor and promote a balance of rest/activity, with adequate nutrition and elimination   Outcome: Progressing     Problem: Anxiety  Goal: Anxiety is at manageable level  Description: Interventions:  - Assess and monitor patient's anxiety level  - Monitor for signs and symptoms (heart palpitations, chest pain, shortness of breath, headaches, nausea, feeling jumpy, restlessness, irritable, apprehensive)  - Collaborate with interdisciplinary team and initiate plan and interventions as ordered    - [place] patient to unit/surroundings  - Explain treatment plan  - Encourage participation in care  - Encourage verbalization of concerns/fears  - Identify coping mechanisms  - Assist in developing anxiety-reducing skills  - Administer/offer alternative therapies  - Limit or eliminate stimulants  Outcome: Progressing     Problem: Risk for Violence/Aggression Toward Others  Goal: Treatment Goal: Refrain from acts of violence/aggression during length of stay, and demonstrate improved impulse control at the time of discharge  Outcome: Progressing  Goal: Verbalize thoughts and feelings  Description: Interventions:  - Assess and re-assess patient's level of risk, every waking shift  - Engage patient in 1:1 interactions, daily, for a minimum of 15 minutes   - Allow patient to express feelings and frustrations in a safe and non-threatening manner   - Establish rapport/trust with patient   Outcome: Progressing  Goal: Refrain from harming others  Outcome: Progressing  Goal: Refrain from destructive acts on the environment or property  Outcome: Progressing  Goal: Control angry outbursts  Description: Interventions:  - Monitor patient closely, per order  - Ensure early verbal de-escalation  - Monitor prn medication needs  - Set reasonable/therapeutic limits, outline behavioral expectations, and consequences   - Provide a non-threatening milieu, utilizing the least restrictive interventions   Outcome: Progressing  Goal: Attend and participate in unit activities, including therapeutic, recreational, and educational groups  Description: Interventions:  - Provide therapeutic and educational activities daily, encourage attendance and participation, and document same in the medical record   Outcome: Progressing  Goal: Identify appropriate positive anger management techniques  Description: Interventions:  - Offer anger management and coping skills groups   - Staff will provide positive feedback for appropriate anger control  Outcome: Progressing     Problem: Ineffective Coping  Goal: Participates in unit activities  Description: Interventions:  - Provide therapeutic environment   - Provide required programming   - Redirect inappropriate behaviors   Outcome: Progressing     Problem: Nutrition/Hydration-ADULT  Goal: Nutrient/Hydration intake appropriate for improving, restoring or maintaining nutritional needs  Description: Monitor and assess patient's nutrition/hydration status for malnutrition  Collaborate with interdisciplinary team and initiate plan and interventions as ordered  Monitor patient's weight and dietary intake as ordered or per policy  Utilize nutrition screening tool and intervene as necessary  Determine patient's food preferences and provide high-protein, high-caloric foods as appropriate       INTERVENTIONS:  - Monitor oral intake, urinary output, labs, and treatment plans  - Assess nutrition and hydration status and recommend course of action  - Evaluate amount of meals eaten  - Assist patient with eating if necessary   - Allow adequate time for meals  - Recommend/ encourage appropriate diets, oral nutritional supplements, and vitamin/mineral supplements  - Order, calculate, and assess calorie counts as needed  - Recommend, monitor, and adjust tube feedings and TPN/PPN based on assessed needs  - Assess need for intravenous fluids  - Provide specific nutrition/hydration education as appropriate  - Include patient/family/caregiver in decisions related to nutrition  Outcome: Progressing     Problem: DISCHARGE PLANNING - CARE MANAGEMENT  Goal: Discharge to post-acute care or home with appropriate resources  Description: INTERVENTIONS:  - Conduct assessment to determine patient/family and health care team treatment goals, and need for post-acute services based on payer coverage, community resources, and patient preferences, and barriers to discharge  - Address psychosocial, clinical, and financial barriers to discharge as identified in assessment in conjunction with the patient/family and health care team  - Arrange appropriate level of post-acute services according to patients   needs and preference and payer coverage in collaboration with the physician and health care team  - Communicate with and update the patient/family, physician, and health care team regarding progress on the discharge plan  - Arrange appropriate transportation to post-acute venues  Outcome: Progressing

## 2022-05-08 NOTE — NURSING NOTE
Post prn prn assessment;patient apparently sleeping,respirations regular and easy  Will continue to monitor

## 2022-05-08 NOTE — NURSING NOTE
Assumed care of this patient at [phone] from previous nurse  Patient observed resting comfortably in their bed without signs/symptoms of distress  Will maintain on safety precautions and continual monitoring

## 2022-05-08 NOTE — PROGRESS NOTES
Progress Note - Clem Correa [age] y o  female MRN: [id number]  Unit/Bed#: [facility] 220-02 Encounter: [id number]    Assessment/Plan   Principal Problem:    Severe episode of recurrent major depressive disorder ([address] )  Active Problems:    Substance use      Subjective:Patient was seen today for continuation of care, records reviewed and  patient was discussed with the morning case review team   Patient is scant and guarded on approach, limited responses and shakes her head no to all assessment questions  Denies depression, anxiety, hallucinations, suicidal ideation, homicidal ideation  Agrees to ask for staff assistance if feeling dizzy and encouraged to sit on edge of bed before getting up  Tolerating Lamictal , Prozac, Klonopin with no reported side effects  Patient denies endorsing any suicidal or homicidal ideation  Does not seem to be experiencing any manic or psychotic symptoms during our encounter  Remains medication compliance  Denisha Serum     Psychiatric Review of Systems:    Sleep: slept off and on  Appetite: fair  Medication side effects: No   ROS: all other systems are negative    Vitals:  Vitals:    05/08/22 0806   BP:    Pulse:    Resp:    Temp: 98 1 °F (36 7 °C)   SpO2:        Mental Status Evaluation:    Appearance:  casually dressed   Behavior:  guarded   Speech:  normal rate and volume   Mood:  depressed   Affect:  constricted   Thought Process:  goal directed   Associations: intact associations   Thought Content:  no overt delusions   Perceptual Disturbances: denies auditory hallucinations when asked   Risk Potential: Suicidal ideation - None at present  Homicidal ideation - None at present  Potential for aggression - No   Sensorium:  oriented to person, place and time/date   Memory:  recent and remote memory grossly intact   Consciousness:  alert and awake   Attention: attention span and concentration appear shorter than expected for age   Insight:  limited   Judgment: limited Gait/Station: normal gait/station   Motor Activity: no abnormal movements     Laboratory results:    I have personally reviewed all pertinent laboratory/tests results    Most Recent Labs:   Lab Results   Component Value Date    WBC 5 79 05/06/2022    RBC 4 73 05/06/2022    HGB 14 7 05/06/2022    HCT 45 6 05/06/2022     05/06/2022    RDW 11 9 05/06/2022    NEUTROABS 3 59 05/06/2022    SODIUM 138 05/06/2022    K 3 9 05/06/2022     05/06/2022    CO2 30 05/06/2022    BUN 10 05/06/2022    CREATININE 0 94 05/06/2022    GLUC 128 05/06/2022    GLUF 77 05/05/2022    CALCIUM 9 3 05/06/2022    AST 15 05/06/2022    ALT 12 05/06/2022    ALKPHOS 42 05/06/2022    TP 6 9 05/06/2022    ALB 4 2 05/06/2022    TBILI 0 49 05/06/2022    CHOLESTEROL 141 05/05/2022    HDL 58 05/05/2022    TRIG 53 05/05/2022    LDLCALC 72 05/05/2022    NONHDLC 83 05/05/2022    FDA6KSLWLWEA 1 135 05/08/2022    PREGUR negative 05/02/2022       Progress Toward Goals:     Unchanged    Recommended Treatment:   Continue current medication regimen  All current active medications have been reviewed  Encourage group therapy, milieu therapy and occupational therapy  Continue treatment with group therapy, milieu therapy and occupational therapy  Behavioral Health checks every 7 minutes  Continue current medications:  Current Facility-Administered Medications   Medication Dose Route Frequency Provider Last Rate    acetaminophen  650 mg Oral Q6H PRN Mary Kay Kaiser [facility] Medei, CRNP      acetaminophen  650 mg Oral Q4H PRN Mary Kay Kaiser [facility] Medei, CRNP      acetaminophen  975 mg Oral Q6H PRN Mary Kay [place] Medei, CRNP      aluminum-magnesium hydroxide-simethicone  30 mL Oral Q4H PRN Mary Kay Kaiser [facility] Medei, CRNP      bacitracin  1 small application Topical BID Rika Garcia MD      benztropine  1 mg Oral Q6H PRN Mary Kay [place] Medei, ELVIANP      [START ON 5/9/2022] clonazePAM  0 25 mg Oral Daily Mary Kaygalen Fariaei, ELVIANP      doxycycline hyclate  100 mg Oral Q12H 18335 Maryjane MD Jacobo Silverman fluconazole  150 mg Oral Once Stephanie TOVAR Yojana PA-C      FLUoxetine  40 mg Oral Daily Roya Newton MD      hydrOXYzine HCL  25 mg Oral Q6H PRN Max 4/day Talisheek Mackintosh Medei, CRNP      hydrOXYzine HCL  50 mg Oral Q4H PRN Max 4/day Alana Mackintosh Medei, CRNP      Or    LORazepam  1 mg Intramuscular Q4H PRN Talisheek Mackintosh Medei, CRNP      lamoTRIgine  25 mg Oral HS Noé Rollins DO      LORazepam  1 mg Oral Q6H PRN Alana Mackintosh Medei, CRNP      Or    LORazepam  2 mg Intramuscular Q6H PRN Max 3/day Alana Mackintosh Medei, CRNP      meclizine  12 5 mg Oral Q8H PRN Stephanie L Yojana, PAAnishC      melatonin  3 mg Oral HS Zeynep M Medei, CRNP      nicotine  1 patch Transdermal Daily Harrison Dunn MD      nicotine polacrilex  2 mg Oral Q2H PRN Barney Frey MD      OLANZapine  10 mg Oral Q3H PRN Max 3/day Talisheek Mackintosh Medei, CRNP      Or    OLANZapine  10 mg Intramuscular Q3H PRN Max 3/day Talisheek Mackintosh Medei, CRNP      OLANZapine  5 mg Oral Q3H PRN Max 6/day Talisheek Mackintosh Medei, CRNP      Or    OLANZapine  5 mg Intramuscular Q3H PRN Max 6/day Zeynep M Medei, CRNP      OLANZapine  2 5 mg Oral Q3H PRN Max 8/day Zeynep M Medei, CRNP      polyethylene glycol  17 g Oral Daily PRN Alana Mackintosh Medei, CRNP      traZODone  100 mg Oral HS PRN Talisheek Mackintosh Medei, CRNP         Risks / Benefits of Treatment:     Risks, benefits, and possible side effects of medications explained to patient  Patient has limited understanding of risks and benefits of treatment at this time, but agrees to take medications as prescribed  Counseling / Coordination of Care:     Patient's progress reviewed with nursing staff  Medications, treatment progress and treatment plan reviewed with patient  Supportive counseling provided to the patient            NACHO Simons

## 2022-05-08 NOTE — NURSING NOTE
Patient observed asleep/resting throughout a majority of the night during q7 minute checks  Early awakening this morning  Non-labored breathing observed during periods of sleep/rest  Will remain on safety precautions and continual monitoring

## 2022-05-08 NOTE — NURSING NOTE
Presents with forced WNL affect;endorses depression and anxiety mild to moderate,controlled and related to inpatient status  Denies SI,HI,AH,VH and agrees to inform staff of any thoughts of self harm  Spends the overwhelming majority of free time in her room,in bed  Encouraged to make at least occasional appearances on unit and to join peers;she stated,"I'll try " Will continue to educate,monitor,and provide safe,therapeutic milieu

## 2022-05-09 PROCEDURE — 99232 SBSQ HOSP IP/OBS MODERATE 35: CPT | Performed by: HOSPITALIST

## 2022-05-09 RX ORDER — BUSPIRONE HYDROCHLORIDE 10 MG/1
10 TABLET ORAL 3 TIMES DAILY
Status: DISCONTINUED | OUTPATIENT
Start: 2022-05-09 | End: 2022-05-11 | Stop reason: HOSPADM

## 2022-05-09 RX ADMIN — DOXYCYCLINE 100 MG: 100 CAPSULE ORAL at 08:14

## 2022-05-09 RX ADMIN — TRAZODONE HYDROCHLORIDE 100 MG: 50 TABLET ORAL at 21:09

## 2022-05-09 RX ADMIN — NICOTINE 1 PATCH: 21 PATCH, EXTENDED RELEASE TRANSDERMAL at 08:15

## 2022-05-09 RX ADMIN — LAMOTRIGINE 25 MG: 25 TABLET ORAL at 21:10

## 2022-05-09 RX ADMIN — CLONAZEPAM 0.25 MG: 0.5 TABLET ORAL at 08:14

## 2022-05-09 RX ADMIN — Medication 3 MG: at 21:09

## 2022-05-09 RX ADMIN — HYDROXYZINE HYDROCHLORIDE 50 MG: 50 TABLET, FILM COATED ORAL at 15:00

## 2022-05-09 RX ADMIN — BUSPIRONE HYDROCHLORIDE 10 MG: 10 TABLET ORAL at 21:10

## 2022-05-09 RX ADMIN — ACETAMINOPHEN 975 MG: 325 TABLET ORAL at 17:41

## 2022-05-09 RX ADMIN — BUSPIRONE HYDROCHLORIDE 10 MG: 10 TABLET ORAL at 17:05

## 2022-05-09 RX ADMIN — NICOTINE POLACRILEX 2 MG: 2 GUM, CHEWING BUCCAL at 15:00

## 2022-05-09 RX ADMIN — BUSPIRONE HYDROCHLORIDE 10 MG: 10 TABLET ORAL at 11:30

## 2022-05-09 RX ADMIN — NICOTINE POLACRILEX 2 MG: 2 GUM, CHEWING BUCCAL at 17:41

## 2022-05-09 RX ADMIN — DOXYCYCLINE 100 MG: 100 CAPSULE ORAL at 21:10

## 2022-05-09 RX ADMIN — FLUOXETINE 40 MG: 20 CAPSULE ORAL at 08:14

## 2022-05-09 NOTE — NURSING NOTE
Patient observed sleeping through the majority of the night with even breaths , no sign of distress observed, no broken sleep

## 2022-05-09 NOTE — PLAN OF CARE
Problem: Alteration in Thoughts and Perception  Goal: Treatment Goal: Gain control of psychotic behaviors/thinking, reduce/eliminate presenting symptoms and demonstrate improved reality functioning upon discharge  Outcome: Progressing  Goal: Verbalize thoughts and feelings  Description: Interventions:  - Promote a nonjudgmental and trusting relationship with the patient through active listening and therapeutic communication  - Assess patient's level of functioning, behavior and potential for risk  - Engage patient in 1 on 1 interactions  - Encourage patient to express fears, feelings, frustrations, and discuss symptoms    - [place] patient to reality, help patient recognize reality-based thinking   - Administer medications as ordered and assess for potential side effects  - Provide the patient education related to the signs and symptoms of the illness and desired effects of prescribed medications  Outcome: Progressing  Goal: Refrain from acting on delusional thinking/internal stimuli  Description: Interventions:  - Monitor patient closely, per order   - Utilize least restrictive measures   - Set reasonable limits, give positive feedback for acceptable   - Administer medications as ordered and monitor of potential side effects  Outcome: Progressing  Goal: Agree to be compliant with medication regime, as prescribed and report medication side effects  Description: Interventions:  - Offer appropriate PRN medication and supervise ingestion; conduct AIMS, as needed   Outcome: Progressing  Goal: Attend and participate in unit activities, including therapeutic, recreational, and educational groups  Description: Interventions:  -Encourage Visitation and family involvement in care  Outcome: Progressing  Goal: Recognize dysfunctional thoughts, communicate reality-based thoughts at the time of discharge  Description: Interventions:  - Provide medication and psycho-education to assist patient in compliance and developing insight into his/her illness   Outcome: Progressing  Goal: Complete daily ADLs, including personal hygiene independently, as able  Description: Interventions:  - Observe, teach, and assist patient with ADLS  - Monitor and promote a balance of rest/activity, with adequate nutrition and elimination   Outcome: Progressing     Problem: Ineffective Coping  Goal: Identifies ineffective coping skills  Outcome: Progressing  Goal: Identifies healthy coping skills  Outcome: Progressing  Goal: Demonstrates healthy coping skills  Outcome: Progressing  Goal: Participates in unit activities  Description: Interventions:  - Provide therapeutic environment   - Provide required programming   - Redirect inappropriate behaviors   Outcome: Progressing  Goal: Patient/Family participate in treatment and DC plans  Description: Interventions:  - Provide therapeutic environment  Outcome: Progressing  Goal: Patient/Family verbalizes awareness of resources  Outcome: Progressing  Goal: Understands least restrictive measures  Description: Interventions:  - Utilize least restrictive behavior  Outcome: Progressing  Goal: Free from restraint events  Description: - Utilize least restrictive measures   - Provide behavioral interventions   - Redirect inappropriate behaviors   Outcome: Progressing     Problem: Risk for Self Injury/Neglect  Goal: Treatment Goal: Remain safe during length of stay, learn and adopt new coping skills, and be free of self-injurious ideation, impulses and acts at the time of discharge  Outcome: Progressing  Goal: Verbalize thoughts and feelings  Description: Interventions:  - Assess and re-assess patient's lethality and potential for self-injury  - Engage patient in 1:1 interactions, daily, for a minimum of 15 minutes  - Encourage patient to express feelings, fears, frustrations, hopes  - Establish rapport/trust with patient   Outcome: Progressing  Goal: Refrain from harming self  Description: Interventions:  - Monitor patient closely, per order  - Develop a trusting relationship  - Supervise medication ingestion, monitor effects and side effects   Outcome: Progressing  Goal: Attend and participate in unit activities, including therapeutic, recreational, and educational groups  Description: Interventions:  - Provide therapeutic and educational activities daily, encourage attendance and participation, and document same in the medical record  - Obtain collateral information, encourage visitation and family involvement in care   Outcome: Progressing  Goal: Recognize maladaptive responses and adopt new coping mechanisms  Outcome: Progressing  Goal: Complete daily ADLs, including personal hygiene independently, as able  Description: Interventions:  - Observe, teach, and assist patient with ADLS  - Monitor and promote a balance of rest/activity, with adequate nutrition and elimination  Outcome: Progressing     Problem: Depression  Goal: Treatment Goal: Demonstrate behavioral control of depressive symptoms, verbalize feelings of improved mood/affect, and adopt new coping skills prior to discharge  Outcome: Progressing  Goal: Verbalize thoughts and feelings  Description: Interventions:  - Assess and re-assess patient's level of risk   - Engage patient in 1:1 interactions, daily, for a minimum of 15 minutes   - Encourage patient to express feelings, fears, frustrations, hopes   Outcome: Progressing  Goal: Refrain from harming self  Description: Interventions:  - Monitor patient closely, per order   - Supervise medication ingestion, monitor effects and side effects   Outcome: Progressing  Goal: Refrain from isolation  Description: Interventions:  - Develop a trusting relationship   - Encourage socialization   Outcome: Progressing  Goal: Refrain from self-neglect  Outcome: Progressing  Goal: Attend and participate in unit activities, including therapeutic, recreational, and educational groups  Description: Interventions:  - Provide therapeutic and educational activities daily, encourage attendance and participation, and document same in the medical record   Outcome: Progressing  Goal: Complete daily ADLs, including personal hygiene independently, as able  Description: Interventions:  - Observe, teach, and assist patient with ADLS  -  Monitor and promote a balance of rest/activity, with adequate nutrition and elimination   Outcome: Progressing     Problem: Anxiety  Goal: Anxiety is at manageable level  Description: Interventions:  - Assess and monitor patient's anxiety level  - Monitor for signs and symptoms (heart palpitations, chest pain, shortness of breath, headaches, nausea, feeling jumpy, restlessness, irritable, apprehensive)  - Collaborate with interdisciplinary team and initiate plan and interventions as ordered    - [place] patient to unit/surroundings  - Explain treatment plan  - Encourage participation in care  - Encourage verbalization of concerns/fears  - Identify coping mechanisms  - Assist in developing anxiety-reducing skills  - Administer/offer alternative therapies  - Limit or eliminate stimulants  Outcome: Progressing     Problem: Risk for Violence/Aggression Toward Others  Goal: Treatment Goal: Refrain from acts of violence/aggression during length of stay, and demonstrate improved impulse control at the time of discharge  Outcome: Progressing  Goal: Verbalize thoughts and feelings  Description: Interventions:  - Assess and re-assess patient's level of risk, every waking shift  - Engage patient in 1:1 interactions, daily, for a minimum of 15 minutes   - Allow patient to express feelings and frustrations in a safe and non-threatening manner   - Establish rapport/trust with patient   Outcome: Progressing  Goal: Refrain from harming others  Outcome: Progressing  Goal: Refrain from destructive acts on the environment or property  Outcome: Progressing  Goal: Control angry outbursts  Description: Interventions:  - Monitor patient closely, per order  - Ensure early verbal de-escalation  - Monitor prn medication needs  - Set reasonable/therapeutic limits, outline behavioral expectations, and consequences   - Provide a non-threatening milieu, utilizing the least restrictive interventions   Outcome: Progressing  Goal: Attend and participate in unit activities, including therapeutic, recreational, and educational groups  Description: Interventions:  - Provide therapeutic and educational activities daily, encourage attendance and participation, and document same in the medical record   Outcome: Progressing  Goal: Identify appropriate positive anger management techniques  Description: Interventions:  - Offer anger management and coping skills groups   - Staff will provide positive feedback for appropriate anger control  Outcome: Progressing     Problem: Ineffective Coping  Goal: Participates in unit activities  Description: Interventions:  - Provide therapeutic environment   - Provide required programming   - Redirect inappropriate behaviors   Outcome: Progressing     Problem: Nutrition/Hydration-ADULT  Goal: Nutrient/Hydration intake appropriate for improving, restoring or maintaining nutritional needs  Description: Monitor and assess patient's nutrition/hydration status for malnutrition  Collaborate with interdisciplinary team and initiate plan and interventions as ordered  Monitor patient's weight and dietary intake as ordered or per policy  Utilize nutrition screening tool and intervene as necessary  Determine patient's food preferences and provide high-protein, high-caloric foods as appropriate       INTERVENTIONS:  - Monitor oral intake, urinary output, labs, and treatment plans  - Assess nutrition and hydration status and recommend course of action  - Evaluate amount of meals eaten  - Assist patient with eating if necessary   - Allow adequate time for meals  - Recommend/ encourage appropriate diets, oral nutritional supplements, and vitamin/mineral supplements  - Order, calculate, and assess calorie counts as needed  - Recommend, monitor, and adjust tube feedings and TPN/PPN based on assessed needs  - Assess need for intravenous fluids  - Provide specific nutrition/hydration education as appropriate  - Include patient/family/caregiver in decisions related to nutrition  Outcome: Progressing     Problem: DISCHARGE PLANNING - CARE MANAGEMENT  Goal: Discharge to post-acute care or home with appropriate resources  Description: INTERVENTIONS:  - Conduct assessment to determine patient/family and health care team treatment goals, and need for post-acute services based on payer coverage, community resources, and patient preferences, and barriers to discharge  - Address psychosocial, clinical, and financial barriers to discharge as identified in assessment in conjunction with the patient/family and health care team  - Arrange appropriate level of post-acute services according to patients   needs and preference and payer coverage in collaboration with the physician and health care team  - Communicate with and update the patient/family, physician, and health care team regarding progress on the discharge plan  - Arrange appropriate transportation to post-acute venues  Outcome: Progressing

## 2022-05-09 NOTE — NURSING NOTE
Patient was pleasant and cooperative Patient guarded and withdrawn  Staff support provided  Q 7 minute safety checks maintained  Patient denies SI/HI  Patient told RN she feels happier today  Staff will continue to monitor and support

## 2022-05-09 NOTE — PROGRESS NOTES
Progress Note - Clarisa Castellanos 37 y o  female MRN: [id number]    Unit/Bed#: [facility] 220-02 Encounter: [id number]        Subjective:   Patient seen and examined at bedside after reviewing the chart and discussing the case with the caring staff  Patient examined at bedside  Patient has no acute complaints  Physical Exam   Vitals: Blood pressure (!) 81/41, pulse 63, temperature 98 8 °F (37 1 °C), temperature source Temporal, resp  rate 16, height 5' 7" (1 702 m), weight 56 7 kg (125 lb), SpO2 98 %  ,Body mass index is 19 58 kg/m²  Constitutional: Patient in no acute distress, lying in bed  Head:  Normocephalic, atraumatic  Eyes: PERRL, EOMI  Neck: Supple  Cardiovascular: Normal rate and regular rhythm  Pulmonary/Chest: Effort normal and breath sounds normal    Abdomen: Soft, + BS, NT  Neurological: Awake, alert, oriented  Speech clear  No motor deficits noted, sensation is intact bilaterally  Musculoskeletal:  Normal range of motion  Skin: Skin is warm and dry  No diaphoresis  No pallor  Assessment/Plan:  Clarisa Castellanos is a(n) 37y o  year old female with MDD      1  History of nicotine dependence  Patient is on nicotine transdermal patch 21 mg daily  2  History of Lyme disease  Patient is currently on doxycycline 100 mg twice daily  3  Recent scabies  Patient received Elimite cream treatment on 05/02/2022  Repeat the treatment with the Elimite 5% cream on 05/08/2022   4  Open wounds over the face and extremity  Bacitracin ointment twice daily  5  Insomnia  Patient is on melatonin at bedtime along with trazodone as needed  6  Arthritis/headache  Patient may get Tylenol on as needed basis  7  Low TSH  Repeat TSH on 05/08/2022 was WNL  8  Syncopal episode  No loss of bowel or bladder control, no tongue biting  No postictal state  Encouraged patient to increase food/fluid intake, which she states she is trying to do  Continue to monitor closely

## 2022-05-09 NOTE — PROGRESS NOTES
05/09/22 0800   Activity/Group Checklist   Group Community meeting   Attendance Did not attend  (pt offered ; pt remained in room)

## 2022-05-09 NOTE — PROGRESS NOTES
Progress Note - [address] 37 y o  female MRN: [id number]   Unit/Bed#: -02 Encounter: [id number]    Behavior over the last 24 hours: unchanged  [place] seen today, per staff report has been mostly withdrawn and in bed on the unit  Patient seen today is highly emotional, teary eyed and feels fearful of all of her stressors that she has to deal with  Is having ongoing difficulty with her functioning, lack of motivation and energy, stays in bed most the day  No active thoughts of self-harm however lacks ability for self-care due to increased depression  Continues to complain of high anxiety  Sleep excessive, appetite fair, denies side effects to medications today  Mental Status Evaluation:    Appearance:  marginal hygiene, wearing hospital clothes, looks stated age   Behavior:  cooperative   Speech:  normal rate, delayed, soft   Mood:  depressed, dysphoric   Affect:  blunted   Thought Process:  logical   Associations: intact associations   Thought Content:  no overt delusions   Perceptual Disturbances: no auditory hallucinations, no visual hallucinations   Risk Potential: Suicidal ideation - None at present  Homicidal ideation - None at present   Sensorium:  oriented to person, place and time/date   Memory:  recent and remote memory grossly intact   Consciousness:  alert and awake   Attention: attention span and concentration are age appropriate   Insight:  limited   Judgment: limited   Gait/Station: normal gait/station   Motor Activity: no abnormal movements     Vital signs in last 24 hours:    Temp:  [98 8 °F (37 1 °C)-99 1 °F (37 3 °C)] 98 8 °F (37 1 °C)  HR:  [63-74] 63  Resp:  [16] 16  BP: (81-97)/(41-55) 81/41    Laboratory results: I have personally reviewed all pertinent laboratory/tests results          Assessment/Plan   Principal Problem:    Severe episode of recurrent major depressive disorder (HCC)  Active Problems:    Substance use    Recommended Treatment:     Planned medication and treatment changes: Add BuSpar 10 mg t i d   For anxiety  Continue Prozac 40 mg daily  Continue Lamictal 25 mg daily, initiated on May 4th, can be increased to 50 mg May 14th  Continue melatonin 3 mg at bedtime    All current active medications have been reviewed  Encourage group therapy, milieu therapy and occupational therapy  Behavioral Health checks every 7 minutes  Current Facility-Administered Medications   Medication Dose Route Frequency Provider Last Rate    acetaminophen  650 mg Oral Q6H PRN Cherilynn [place]el [place] Medei, CRNP      acetaminophen  650 mg Oral Q4H PRN Cherilynn [place] Medei, CRNP      acetaminophen  975 mg Oral Q6H PRN Cherilynn [place] Medei, CRNP      aluminum-magnesium hydroxide-simethicone  30 mL Oral Q4H PRN Joie Hazel Green Medei, CRNP      bacitracin  1 small application Topical BID Mily Vazquez MD      benztropine  1 mg Oral Q6H PRN Joie Hazel Green Medei, CRNP      busPIRone  10 mg Oral TID Saqib Bustillos MD      doxycycline hyclate  100 mg Oral Q12H [facility] Mily Vazquez MD      fluconazole  150 mg Oral Once Stephanie Dial PA-C      FLUoxetine  40 mg Oral Daily Saqib Bustillos MD      hydrOXYzine HCL  25 mg Oral Q6H PRN Max 4/day Zeynep M Medei, CRKERRY      hydrOXYzine HCL  50 mg Oral Q4H PRN Max 4/day Cherilynn [place]el [place] Medei, CRNP      Or    LORazepam  1 mg Intramuscular Q4H PRN Joie [place]el [place] Medei, CRNP      lamoTRIgine  25 mg Oral HS Clay Livingston DO      LORazepam  1 mg Oral Q6H PRN Joie Hazel Green Medei, CRNP      Or    LORazepam  2 mg Intramuscular Q6H PRN Max 3/day Cherilynn Hazel Green Medei, CRNP      meclizine  12 5 mg Oral Q8H PRN Stephanie Dial PA-C      melatonin  3 mg Oral HS Zeynep FOX Medei, CRNP      nicotine  1 patch Transdermal Daily Mily Vazquez MD      nicotine polacrilex  2 mg Oral Q2H PRN Amy Birmingham MD      OLANZapine  10 mg Oral Q3H PRN Max 3/day Cherilynn Hazel Green Medei, CRKERRY      Or    OLANZapine  10 mg Intramuscular Q3H PRN Max 3/day Cherilynn Hazel Green Medei, CRNP      OLANZapine  5 mg Oral Q3H PRN Max 6/day Alana Mackintosh Medei, CRNP      Or    OLANZapine  5 mg Intramuscular Q3H PRN Max 6/day Alana Mackintosh Medei, CRNP      OLANZapine  2 5 mg Oral Q3H PRN Max 8/day [place] Mackintosh Medei, CRNP      polyethylene glycol  17 g Oral Daily PRN Alana Mackintosh Medei, CRNP      traZODone  100 mg Oral HS PRN Alana Mackintosh Medei, CRNP         Risks / Benefits of Treatment:    Risks, benefits, and possible side effects of medications explained to patient and patient verbalizes understanding and agreement for treatment  Counseling / Coordination of Care: Total floor / unit time spent today 25 minutes  Greater than 50% of total time was spent with the patient and / or family counseling and / or coordination of care  A description of counseling / coordination of care:  Patient's progress discussed with staff in treatment team meeting  Medications, treatment progress and treatment plan reviewed with patient      Roya Newton MD 05/09/22

## 2022-05-09 NOTE — PROGRESS NOTES
05/09/22 0830   Team Meeting   Meeting Type Daily Rounds   Team Members Present   Team Members Present Physician;Nurse;   Physician Team Member Dr Davonte Collazo MD; Kranthi Suarez, [address] [place] [address]   Nursing Team Member Larry Pina [facility] Management Team Member Geeta Pulido 88, [facility]   Patient/Family Present   Patient Present No   Patient's Family Present No   D/C Thursday or Friday, ok with lateral move, syncable episode on Friday, saturday extremely isolated, verbalized feeling better today, ate meals in room this weekend, but did eat breakfast in dinning room today, 2nd scabies tx yesterday, medication adjustment

## 2022-05-09 NOTE — PROGRESS NOTES
05/09/22 1405   Activity/Group Checklist   Group Admission/Discharge   Attendance Attended   Attendance Duration (min) 16-30   Interactions Interacted appropriately   Affect/Mood Appropriate   Goals Achieved Identified feelings; Identified triggers; Identified relapse prevention strategies; Discussed coping strategies; Discussed discharge plans; Identified resources and support systems; Able to listen to others; Able to engage in interactions; Able to reflect/comment on own behavior;Able to self-disclose; Able to recieve feedback;Verbalized increased hopefulness

## 2022-05-10 PROCEDURE — 99232 SBSQ HOSP IP/OBS MODERATE 35: CPT | Performed by: NURSE PRACTITIONER

## 2022-05-10 RX ORDER — HYDROXYZINE 50 MG/1
100 TABLET, FILM COATED ORAL EVERY 6 HOURS PRN
Status: DISCONTINUED | OUTPATIENT
Start: 2022-05-10 | End: 2022-05-11 | Stop reason: HOSPADM

## 2022-05-10 RX ADMIN — LAMOTRIGINE 25 MG: 25 TABLET ORAL at 21:06

## 2022-05-10 RX ADMIN — BUSPIRONE HYDROCHLORIDE 10 MG: 10 TABLET ORAL at 10:45

## 2022-05-10 RX ADMIN — BUSPIRONE HYDROCHLORIDE 10 MG: 10 TABLET ORAL at 16:36

## 2022-05-10 RX ADMIN — FLUOXETINE 40 MG: 20 CAPSULE ORAL at 10:45

## 2022-05-10 RX ADMIN — NICOTINE POLACRILEX 2 MG: 2 GUM, CHEWING BUCCAL at 16:36

## 2022-05-10 RX ADMIN — HYDROXYZINE HYDROCHLORIDE 50 MG: 50 TABLET, FILM COATED ORAL at 19:31

## 2022-05-10 RX ADMIN — NICOTINE 1 PATCH: 21 PATCH, EXTENDED RELEASE TRANSDERMAL at 10:47

## 2022-05-10 RX ADMIN — TRAZODONE HYDROCHLORIDE 100 MG: 50 TABLET ORAL at 21:06

## 2022-05-10 RX ADMIN — Medication 3 MG: at 21:06

## 2022-05-10 RX ADMIN — BUSPIRONE HYDROCHLORIDE 10 MG: 10 TABLET ORAL at 21:06

## 2022-05-10 RX ADMIN — DOXYCYCLINE 100 MG: 100 CAPSULE ORAL at 21:06

## 2022-05-10 RX ADMIN — DOXYCYCLINE 100 MG: 100 CAPSULE ORAL at 10:45

## 2022-05-10 NOTE — NURSING NOTE
Patient reported "numbness in my tongue because I ate carrot cake " Patient requested and received atarax 50mg PO  Will monitor

## 2022-05-10 NOTE — PROGRESS NOTES
Progress Note - Clem [address] 37 y o  female MRN: [id number]  Unit/Bed#: [facility] 220-02 Encounter: [id number]    Assessment/Plan   Principal Problem:    Severe episode of recurrent major depressive disorder ([address] )  Active Problems:    Substance use      Behavior over the last 24 hours:  unchanged  Sleep: normal  Appetite:  Fair  Medication side effects: No  ROS: no complaints and all other systems are negative    Amy was seen today for psychiatric follow-up  She has been withdrawn to room and in bed for most of the morning; declines to attend group  She reports improving anxiety and depression and verbalized her want for inpatient rehab  Describes she has been, overusing my Adderall    She no longer endorses SI and is able to contract for safety  She described her mother, sister, and best friend as strong supports and her child as a strong protective factor against suicide  Thoughts were linear, logical, and goal directed with no delusional content verbalized  Denied AVH/HI, nor did [place] appear internally preoccupied    She described relationship and job as stressors but was forward thinking in that 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 "     Mental Status Evaluation:  Appearance:  age appropriate, casually dressed and Long dark hair, marginal hygiene, laying in bed   Behavior:  Withdrawn, calm   Speech:  soft   Mood:  Better   Affect:  blunted   Thought Process:  goal directed and Linear   Thought Content:  No overt delusions or paranoia   Perceptual Disturbances: Denies AVH, does not appear internally preoccupied   Risk Potential: Suicidal Ideations none  Homicidal Ideations none  Potential for Aggression No   Sensorium:  person, place, time/date and situation   Memory:  recent and remote memory grossly intact   Consciousness:  alert and awake    Attention: attention span and concentration were age appropriate   Insight:  limited   Judgment: limited Gait/Station: normal gait/station and normal balance   Motor Activity: no abnormal movements     Progress Toward Goals:  Some improvement  Reports improving anxiety and depression and no longer endorsing SI  Expressing desire and motivation for inpatient rehab  BuSpar 10 mg PO TID added to medication regimen yesterday, 05/09/2022  Will continue with current psychotropic regimen with plan to further titrate Lamictal to 50 mg daily  CM exploring IP rehab options  Recommended Treatment: Continue with group therapy, milieu therapy and occupational therapy  Risks, benefits and possible side effects of Medications:   Risks, benefits, and possible side effects of medications explained to patient and patient verbalizes understanding        Medications:   all current active meds have been reviewed, continue current psychiatric medications and current meds:   Current Facility-Administered Medications   Medication Dose Route Frequency    acetaminophen (TYLENOL) tablet 650 mg  650 mg Oral Q6H PRN    acetaminophen (TYLENOL) tablet 650 mg  650 mg Oral Q4H PRN    acetaminophen (TYLENOL) tablet 975 mg  975 mg Oral Q6H PRN    aluminum-magnesium hydroxide-simethicone (MYLANTA) oral suspension 30 mL  30 mL Oral Q4H PRN    bacitracin topical ointment 1 small application  1 small application Topical BID    benztropine (COGENTIN) tablet 1 mg  1 mg Oral Q6H PRN    busPIRone (BUSPAR) tablet 10 mg  10 mg Oral TID    doxycycline hyclate (VIBRAMYCIN) capsule 100 mg  100 mg Oral Q12H [facility]    fluconazole (DIFLUCAN) tablet 150 mg  150 mg Oral Once    FLUoxetine (PROzac) capsule 40 mg  40 mg Oral Daily    hydrOXYzine HCL (ATARAX) tablet 100 mg  100 mg Oral Q6H PRN    hydrOXYzine HCL (ATARAX) tablet 25 mg  25 mg Oral Q6H PRN Max 4/day    hydrOXYzine HCL (ATARAX) tablet 50 mg  50 mg Oral Q4H PRN Max 4/day    lamoTRIgine (LaMICtal) tablet 25 mg  25 mg Oral HS    meclizine (ANTIVERT) tablet 12 5 mg  12 5 mg Oral Q8H PRN  melatonin tablet 3 mg  3 mg Oral HS    nicotine (NICODERM CQ) 21 mg/24 hr TD 24 hr patch 1 patch  1 patch Transdermal Daily    nicotine polacrilex (NICORETTE) gum 2 mg  2 mg Oral Q2H PRN    OLANZapine (ZyPREXA) tablet 10 mg  10 mg Oral Q3H PRN Max 3/day    Or    OLANZapine (ZyPREXA) IM injection 10 mg  10 mg Intramuscular Q3H PRN Max 3/day    OLANZapine (ZyPREXA) tablet 5 mg  5 mg Oral Q3H PRN Max 6/day    Or    OLANZapine (ZyPREXA) IM injection 5 mg  5 mg Intramuscular Q3H PRN Max 6/day    OLANZapine (ZyPREXA) tablet 2 5 mg  2 5 mg Oral Q3H PRN Max 8/day    polyethylene glycol (MIRALAX) packet 17 g  17 g Oral Daily PRN    traZODone (DESYREL) tablet 100 mg  100 mg Oral HS PRN     Labs: I have personally reviewed all pertinent laboratory/tests results  Counseling / Coordination of Care  Total floor / unit time spent today 30 minutes  Greater than 50% of total time was spent with the patient and / or family counseling and / or coordination of care   A description of the counseling / coordination of care:  Medication education, treatment plan, supportive therapy

## 2022-05-10 NOTE — PROGRESS NOTES
05/10/22 1000   Activity/Group Checklist   Group Cognitive therapy   Attendance Did not attend  (Group therapy encouraged, patient denied)   Attendance Duration (min) 0-15

## 2022-05-10 NOTE — NURSING NOTE
Patient more visible in the milieu this evening  Blunted affect that brightens on approach  More social  She states that her anxiety has improved rating it at 0/10 but her depression is about a 5/10, as she states that she is patiently waiting for discharge  She states she will be discharged to a 30 day mental health retreat  Superficial but appropriate  Requested to be taken off finger foods  Patient denies SI and has not had self injurious thoughts or behaviors  Psych contacted and order received  Compliant with medications as ordered  Trazodone given at HS medication pass  No other needs identified  Will remain on safety precautions and continual monitoring

## 2022-05-10 NOTE — PLAN OF CARE
Problem: Alteration in Thoughts and Perception  Goal: Treatment Goal: Gain control of psychotic behaviors/thinking, reduce/eliminate presenting symptoms and demonstrate improved reality functioning upon discharge  Outcome: Progressing  Goal: Verbalize thoughts and feelings  Description: Interventions:  - Promote a nonjudgmental and trusting relationship with the patient through active listening and therapeutic communication  - Assess patient's level of functioning, behavior and potential for risk  - Engage patient in 1 on 1 interactions  - Encourage patient to express fears, feelings, frustrations, and discuss symptoms    - [place] patient to reality, help patient recognize reality-based thinking   - Administer medications as ordered and assess for potential side effects  - Provide the patient education related to the signs and symptoms of the illness and desired effects of prescribed medications  Outcome: Progressing  Goal: Refrain from acting on delusional thinking/internal stimuli  Description: Interventions:  - Monitor patient closely, per order   - Utilize least restrictive measures   - Set reasonable limits, give positive feedback for acceptable   - Administer medications as ordered and monitor of potential side effects  Outcome: Progressing  Goal: Agree to be compliant with medication regime, as prescribed and report medication side effects  Description: Interventions:  - Offer appropriate PRN medication and supervise ingestion; conduct AIMS, as needed   Outcome: Progressing  Goal: Attend and participate in unit activities, including therapeutic, recreational, and educational groups  Description: Interventions:  -Encourage Visitation and family involvement in care  Outcome: Progressing  Goal: Recognize dysfunctional thoughts, communicate reality-based thoughts at the time of discharge  Description: Interventions:  - Provide medication and psycho-education to assist patient in compliance and developing insight into his/her illness   Outcome: Progressing  Goal: Complete daily ADLs, including personal hygiene independently, as able  Description: Interventions:  - Observe, teach, and assist patient with ADLS  - Monitor and promote a balance of rest/activity, with adequate nutrition and elimination   Outcome: Progressing     Problem: Ineffective Coping  Goal: Identifies ineffective coping skills  Outcome: Progressing  Goal: Identifies healthy coping skills  Outcome: Progressing  Goal: Demonstrates healthy coping skills  Outcome: Progressing  Goal: Participates in unit activities  Description: Interventions:  - Provide therapeutic environment   - Provide required programming   - Redirect inappropriate behaviors   Outcome: Progressing  Goal: Patient/Family participate in treatment and DC plans  Description: Interventions:  - Provide therapeutic environment  Outcome: Progressing  Goal: Patient/Family verbalizes awareness of resources  Outcome: Progressing  Goal: Understands least restrictive measures  Description: Interventions:  - Utilize least restrictive behavior  Outcome: Progressing  Goal: Free from restraint events  Description: - Utilize least restrictive measures   - Provide behavioral interventions   - Redirect inappropriate behaviors   Outcome: Progressing     Problem: Risk for Self Injury/Neglect  Goal: Treatment Goal: Remain safe during length of stay, learn and adopt new coping skills, and be free of self-injurious ideation, impulses and acts at the time of discharge  Outcome: Progressing  Goal: Verbalize thoughts and feelings  Description: Interventions:  - Assess and re-assess patient's lethality and potential for self-injury  - Engage patient in 1:1 interactions, daily, for a minimum of 15 minutes  - Encourage patient to express feelings, fears, frustrations, hopes  - Establish rapport/trust with patient   Outcome: Progressing  Goal: Refrain from harming self  Description: Interventions:  - Monitor patient closely, per order  - Develop a trusting relationship  - Supervise medication ingestion, monitor effects and side effects   Outcome: Progressing  Goal: Attend and participate in unit activities, including therapeutic, recreational, and educational groups  Description: Interventions:  - Provide therapeutic and educational activities daily, encourage attendance and participation, and document same in the medical record  - Obtain collateral information, encourage visitation and family involvement in care   Outcome: Progressing  Goal: Recognize maladaptive responses and adopt new coping mechanisms  Outcome: Progressing  Goal: Complete daily ADLs, including personal hygiene independently, as able  Description: Interventions:  - Observe, teach, and assist patient with ADLS  - Monitor and promote a balance of rest/activity, with adequate nutrition and elimination  Outcome: Progressing     Problem: Depression  Goal: Treatment Goal: Demonstrate behavioral control of depressive symptoms, verbalize feelings of improved mood/affect, and adopt new coping skills prior to discharge  Outcome: Progressing  Goal: Verbalize thoughts and feelings  Description: Interventions:  - Assess and re-assess patient's level of risk   - Engage patient in 1:1 interactions, daily, for a minimum of 15 minutes   - Encourage patient to express feelings, fears, frustrations, hopes   Outcome: Progressing  Goal: Refrain from harming self  Description: Interventions:  - Monitor patient closely, per order   - Supervise medication ingestion, monitor effects and side effects   Outcome: Progressing  Goal: Refrain from isolation  Description: Interventions:  - Develop a trusting relationship   - Encourage socialization   Outcome: Progressing  Goal: Refrain from self-neglect  Outcome: Progressing  Goal: Attend and participate in unit activities, including therapeutic, recreational, and educational groups  Description: Interventions:  - Provide therapeutic and educational activities daily, encourage attendance and participation, and document same in the medical record   Outcome: Progressing  Goal: Complete daily ADLs, including personal hygiene independently, as able  Description: Interventions:  - Observe, teach, and assist patient with ADLS  -  Monitor and promote a balance of rest/activity, with adequate nutrition and elimination   Outcome: Progressing     Problem: Anxiety  Goal: Anxiety is at manageable level  Description: Interventions:  - Assess and monitor patient's anxiety level  - Monitor for signs and symptoms (heart palpitations, chest pain, shortness of breath, headaches, nausea, feeling jumpy, restlessness, irritable, apprehensive)  - Collaborate with interdisciplinary team and initiate plan and interventions as ordered    - [place] patient to unit/surroundings  - Explain treatment plan  - Encourage participation in care  - Encourage verbalization of concerns/fears  - Identify coping mechanisms  - Assist in developing anxiety-reducing skills  - Administer/offer alternative therapies  - Limit or eliminate stimulants  Outcome: Progressing     Problem: Risk for Violence/Aggression Toward Others  Goal: Treatment Goal: Refrain from acts of violence/aggression during length of stay, and demonstrate improved impulse control at the time of discharge  Outcome: Progressing  Goal: Verbalize thoughts and feelings  Description: Interventions:  - Assess and re-assess patient's level of risk, every waking shift  - Engage patient in 1:1 interactions, daily, for a minimum of 15 minutes   - Allow patient to express feelings and frustrations in a safe and non-threatening manner   - Establish rapport/trust with patient   Outcome: Progressing  Goal: Refrain from harming others  Outcome: Progressing  Goal: Refrain from destructive acts on the environment or property  Outcome: Progressing  Goal: Control angry outbursts  Description: Interventions:  - Monitor patient closely, per order  - Ensure early verbal de-escalation  - Monitor prn medication needs  - Set reasonable/therapeutic limits, outline behavioral expectations, and consequences   - Provide a non-threatening milieu, utilizing the least restrictive interventions   Outcome: Progressing  Goal: Attend and participate in unit activities, including therapeutic, recreational, and educational groups  Description: Interventions:  - Provide therapeutic and educational activities daily, encourage attendance and participation, and document same in the medical record   Outcome: Progressing  Goal: Identify appropriate positive anger management techniques  Description: Interventions:  - Offer anger management and coping skills groups   - Staff will provide positive feedback for appropriate anger control  Outcome: Progressing     Problem: Nutrition/Hydration-ADULT  Goal: Nutrient/Hydration intake appropriate for improving, restoring or maintaining nutritional needs  Description: Monitor and assess patient's nutrition/hydration status for malnutrition  Collaborate with interdisciplinary team and initiate plan and interventions as ordered  Monitor patient's weight and dietary intake as ordered or per policy  Utilize nutrition screening tool and intervene as necessary  Determine patient's food preferences and provide high-protein, high-caloric foods as appropriate       INTERVENTIONS:  - Monitor oral intake, urinary output, labs, and treatment plans  - Assess nutrition and hydration status and recommend course of action  - Evaluate amount of meals eaten  - Assist patient with eating if necessary   - Allow adequate time for meals  - Recommend/ encourage appropriate diets, oral nutritional supplements, and vitamin/mineral supplements  - Order, calculate, and assess calorie counts as needed  - Recommend, monitor, and adjust tube feedings and TPN/PPN based on assessed needs  - Assess need for intravenous fluids  - Provide specific nutrition/hydration education as appropriate  - Include patient/family/caregiver in decisions related to nutrition  Outcome: Progressing     Problem: DISCHARGE PLANNING - CARE MANAGEMENT  Goal: Discharge to post-acute care or home with appropriate resources  Description: INTERVENTIONS:  - Conduct assessment to determine patient/family and health care team treatment goals, and need for post-acute services based on payer coverage, community resources, and patient preferences, and barriers to discharge  - Address psychosocial, clinical, and financial barriers to discharge as identified in assessment in conjunction with the patient/family and health care team  - Arrange appropriate level of post-acute services according to patients   needs and preference and payer coverage in collaboration with the physician and health care team  - Communicate with and update the patient/family, physician, and health care team regarding progress on the discharge plan  - Arrange appropriate transportation to post-acute venues  Outcome: Progressing     Problem: Ineffective Coping  Goal: Participates in unit activities  Description: Interventions:  - Provide therapeutic environment   - Provide required programming   - Redirect inappropriate behaviors   Outcome: Progressing

## 2022-05-10 NOTE — NURSING NOTE
[place] was observed isolative to self for majority of the shift  Pt only came out of room for meals and medications  Pt denies anxiety, depression, SI/HI/AVH  Pt is clam and cooperative  Pt asked about D/C  Support offered  Will continue to monitor

## 2022-05-10 NOTE — PROGRESS NOTES
Progress Note - Viviana Howard 37 y o  female MRN: [id number]    Unit/Bed#: [facility] 220-02 Encounter: [id number]        Subjective:   Patient seen and examined at bedside after reviewing the chart and discussing the case with the caring staff  Patient examined at bedside  Patient has no acute complaints  Physical Exam   Vitals: Blood pressure 90/50, pulse 70, temperature 99 4 °F (37 4 °C), temperature source Temporal, resp  rate 16, height 5' 7" (1 702 m), weight 56 7 kg (125 lb), SpO2 93 %  ,Body mass index is 19 58 kg/m²  Constitutional: Patient in no acute distress, lying in bed  Head:  Normocephalic, atraumatic  Eyes: PERRL, EOMI  Neck: Supple  Cardiovascular: Normal rate and regular rhythm  Pulmonary/Chest: Effort normal and breath sounds normal    Abdomen: Soft, + BS, NT  Neurological: Awake, alert, oriented  Speech clear  No motor deficits noted, sensation is intact bilaterally  Musculoskeletal:  Normal range of motion  Skin: Skin is warm and dry  No diaphoresis  No pallor  Assessment/Plan:  Viviana Howard is a(n) 37y o  year old female with MDD      1  History of nicotine dependence  Patient is on nicotine transdermal patch 21 mg daily  2  History of Lyme disease  Patient is currently on doxycycline 100 mg twice daily  3  Recent scabies  Patient received Elimite cream treatment on 05/02/2022  Repeat the treatment with the Elimite 5% cream on 05/08/2022   4  Open wounds over the face and extremity  Bacitracin ointment twice daily  5  Insomnia  Patient is on melatonin at bedtime along with trazodone as needed  6  Arthritis/headache  Patient may get Tylenol on as needed basis  7  Low TSH  Repeat TSH on 05/08/2022 was WNL  8  Syncopal episode  No loss of bowel or bladder control, no tongue biting  No postictal state  Encouraged patient to increase food/fluid intake, which she states she is trying to do  Continue to monitor closely

## 2022-05-10 NOTE — PROGRESS NOTES
05/10/22 0842   Team Meeting   Meeting Type Daily Rounds   Team Members Present   Team Members Present Physician;Nurse;; Other (Discipline and Name)   Physician Team Member Victorino wong [place]   Nursing Team Member [address] [facility] Work Team Member Mando   Other (Discipline and Name) [facility] Rice [facility]   Patient/Family Present   Patient Present No   Patient's Family Present No     Patient is in need of rehab  She is depressed

## 2022-05-10 NOTE — TREATMENT TEAM
05/10/22 0800   Activity/Group Checklist   Group Community meeting   Attendance Did not attend  (Patient was encouraged to attend community meeting group but)   Attendance Duration (min) 0-15

## 2022-05-11 VITALS
DIASTOLIC BLOOD PRESSURE: 68 MMHG | OXYGEN SATURATION: 100 % | HEIGHT: 67 IN | HEART RATE: 68 BPM | TEMPERATURE: 98.2 F | BODY MASS INDEX: 19.62 KG/M2 | SYSTOLIC BLOOD PRESSURE: 109 MMHG | WEIGHT: 125 LBS | RESPIRATION RATE: 18 BRPM

## 2022-05-11 PROBLEM — F33.2 SEVERE EPISODE OF RECURRENT MAJOR DEPRESSIVE DISORDER (HCC): Status: RESOLVED | Noted: 2022-05-04 | Resolved: 2022-05-11

## 2022-05-11 PROCEDURE — 99238 HOSP IP/OBS DSCHRG MGMT 30/<: CPT | Performed by: NURSE PRACTITIONER

## 2022-05-11 PROCEDURE — NC001 PR NO CHARGE: Performed by: NURSE PRACTITIONER

## 2022-05-11 RX ORDER — DOXYCYCLINE HYCLATE 100 MG/1
100 CAPSULE ORAL EVERY 12 HOURS SCHEDULED
Qty: 28 CAPSULE | Refills: 0 | Status: CANCELLED | OUTPATIENT
Start: 2022-05-11 | End: 2022-05-25

## 2022-05-11 RX ORDER — FLUOXETINE HYDROCHLORIDE 40 MG/1
40 CAPSULE ORAL DAILY
Qty: 30 CAPSULE | Refills: 0 | Status: SHIPPED | OUTPATIENT
Start: 2022-05-12 | End: 2022-06-11

## 2022-05-11 RX ORDER — NICOTINE 21 MG/24HR
1 PATCH, TRANSDERMAL 24 HOURS TRANSDERMAL DAILY
Qty: 28 PATCH | Refills: 0 | Status: SHIPPED | OUTPATIENT
Start: 2022-05-12

## 2022-05-11 RX ORDER — LAMOTRIGINE 25 MG/1
25 TABLET ORAL
Qty: 30 TABLET | Refills: 0 | Status: SHIPPED | OUTPATIENT
Start: 2022-05-11 | End: 2022-06-10

## 2022-05-11 RX ORDER — GINSENG 100 MG
1 CAPSULE ORAL 2 TIMES DAILY PRN
Status: DISCONTINUED | OUTPATIENT
Start: 2022-05-11 | End: 2022-05-11 | Stop reason: HOSPADM

## 2022-05-11 RX ORDER — BUSPIRONE HYDROCHLORIDE 10 MG/1
10 TABLET ORAL 3 TIMES DAILY
Qty: 90 TABLET | Refills: 0 | Status: SHIPPED | OUTPATIENT
Start: 2022-05-11 | End: 2022-06-10

## 2022-05-11 RX ORDER — DOXYCYCLINE HYCLATE 100 MG/1
100 CAPSULE ORAL EVERY 12 HOURS SCHEDULED
Qty: 28 CAPSULE | Refills: 0 | Status: SHIPPED | OUTPATIENT
Start: 2022-05-11 | End: 2022-05-25

## 2022-05-11 RX ORDER — LANOLIN ALCOHOL/MO/W.PET/CERES
6 CREAM (GRAM) TOPICAL
Qty: 60 TABLET | Refills: 0 | Status: SHIPPED | OUTPATIENT
Start: 2022-05-11 | End: 2022-06-10

## 2022-05-11 RX ORDER — NICOTINE 21 MG/24HR
1 PATCH, TRANSDERMAL 24 HOURS TRANSDERMAL DAILY
Qty: 28 PATCH | Refills: 0 | Status: CANCELLED | OUTPATIENT
Start: 2022-05-12

## 2022-05-11 RX ORDER — LANOLIN ALCOHOL/MO/W.PET/CERES
6 CREAM (GRAM) TOPICAL
Status: DISCONTINUED | OUTPATIENT
Start: 2022-05-11 | End: 2022-05-11 | Stop reason: HOSPADM

## 2022-05-11 RX ADMIN — FLUOXETINE 40 MG: 20 CAPSULE ORAL at 08:52

## 2022-05-11 RX ADMIN — NICOTINE POLACRILEX 2 MG: 2 GUM, CHEWING BUCCAL at 15:05

## 2022-05-11 RX ADMIN — BACITRACIN 1 SMALL APPLICATION: 500 OINTMENT TOPICAL at 08:52

## 2022-05-11 RX ADMIN — BUSPIRONE HYDROCHLORIDE 10 MG: 10 TABLET ORAL at 15:05

## 2022-05-11 RX ADMIN — BUSPIRONE HYDROCHLORIDE 10 MG: 10 TABLET ORAL at 08:52

## 2022-05-11 RX ADMIN — DOXYCYCLINE 100 MG: 100 CAPSULE ORAL at 08:52

## 2022-05-11 RX ADMIN — NICOTINE 1 PATCH: 21 PATCH, EXTENDED RELEASE TRANSDERMAL at 08:54

## 2022-05-11 NOTE — NURSING NOTE
Pt was social and participated in groups  Pt was bright and reports decreased anxiety a depression  Pt is looking forward to discharge  Pt was medication compliant and denies pain  Q 7 minute safety checks were maintained

## 2022-05-11 NOTE — BH TRANSITION RECORD
Contact Information: If you have any questions, concerns, pended studies, tests and/or procedures, or emergencies regarding your inpatient behavioral health visit  Please contact [facility] Polo[facility] behavioral health unit [phone] and ask to speak to a , nurse or physician  A contact is available 24 hours/ 7 days a week at this number  Summary of Procedures Performed During your Stay:  Below is a list of major procedures performed during your hospital stay and a summary of results:  - No major procedures performed  Pending Studies (From admission, onward)    None        If studies are pending at discharge, follow up with your PCP and/or referring provider

## 2022-05-11 NOTE — NURSING NOTE
Pt was discharged from unit  Pt was cooperative with discharge  Discharge instructions were given to patient and patient verbalized understanding  Pt denied SI/HI/AVH at time of discharge  Pt was discharged to family pending acceptance to the [place]

## 2022-05-11 NOTE — PROGRESS NOTES
Progress Note - Axel Yeh 37 y o  female MRN: [id number]    Unit/Bed#: [facility] 220-02 Encounter: [id number]        Subjective:   Patient seen and examined at bedside after reviewing the chart and discussing the case with the caring staff  Patient examined at bedside  Patient has no acute complaints  Patient is a possible discharge for tomorrow, Thursday 05/12/2022  Physical Exam   Vitals: Blood pressure 93/51, pulse 73, temperature 98 3 °F (36 8 °C), temperature source Temporal, resp  rate 18, height 5' 7" (1 702 m), weight 56 7 kg (125 lb), SpO2 99 %  ,Body mass index is 19 58 kg/m²  Constitutional: Patient in no acute distress  HENT:  Normocephalic, atraumatic, PERRL, EOMI, neck supple  Cardiovascular: Normal rate and regular rhythm  Pulmonary/Chest: Effort normal and breath sounds normal    Abdomen: Soft, + BS, NT  Assessment/Plan:  Axel Yeh is a(n) 37y o  year old female with MDD      1  History of nicotine dependence  Patient is on nicotine transdermal patch 21 mg daily  2  History of Lyme disease  Patient is currently on doxycycline 100 mg twice daily  3  Insomnia  Patient is on melatonin at bedtime, trazodone as needed  4  Arthritis/headache  Patient may get Tylenol on as needed basis  5  Syncopal episode  Encouraged patient to increase food/fluid intake, which she states she is trying to do  Continue to monitor closely  The patient was discussed with Dr Natalia Gallardo and he is in agreement with the above note

## 2022-05-11 NOTE — PROGRESS NOTES
05/11/22 0845   Team Meeting   Meeting Type Daily Rounds   Team Members Present   Team Members Present Physician;Nurse;; Other (Discipline and Name)   Physician Team Member Kandi Schirmer   Nursing Team Member Yenni Blue 17   Social Work Team Member [place]   Patient/Family Present   Patient Present No   Patient's Family Present No     Patient has no acute symptoms  She is cleared for Rehab  [place] to be contacted

## 2022-05-11 NOTE — DISCHARGE INSTR - OTHER ORDERS
You are being discharged home to [address] [facility]  You will be admitted to [facility] for in patient substance abuse treatment  They will transport you from your home  [facility] Crisis Intervention: [phone]  *National Suicide Prevention Lifeline:  8-931.157.5590  *Peer Support Talk Line (Seven days a week, 1:00 PM - 9:00 PM) Call: 767.273.7267 or Text: [address] on [address] [facility] ([facility]) HELPLINE: 189.251.9390/Website: www maxime org  *Substance Abuse and [address] [facility] [employer], which is a confidential, free, 24-hour-a-day, 365-day-a-year, information service for individuals and family members facing mental health and/or substance use disorders  This service provides referrals to local treatment facilities, support groups, and community-based organizations  Callers can also order free publications and other information  Call 0-177.929.4951/Website: www [facility] gov  *[facility] 2-1-1: This is a toll free, confidential, 24-hour-a-day service which connects you to a community  in your area who can help you find services and resources that are available to you locally and provide critical services that can improve and save lives    Call: 211  /Website: https://[employer]/

## 2022-05-11 NOTE — PROGRESS NOTES
05/11/22 1000   Activity/Group Checklist   Group Self Esteem   Attendance Attended   Attendance Duration (min) Greater than 60   Interactions Interacted appropriately   Affect/Mood Appropriate;Bright;Calm;Normal range   Goals Achieved Identified feelings; Identified triggers; Discussed coping strategies; Discussed discharge plans; Discussed self-esteem issues; Able to listen to others; Able to engage in interactions; Able to reflect/comment on own behavior;Able to manage/cope with feelings; Able to recieve feedback; Able to give feedback to another   Patient was able to express her feelings and share appropriately with peers and staff

## 2022-05-11 NOTE — DISCHARGE INSTR - APPOINTMENTS
Rosa Barrera or Vickie, shine Juarez and Pasquale, will be calling you after your discharge, on the phone number that you provided  They will be available as an additional support, if needed  If you wish to speak with one of them, you may contact Kal Vanegas at [phone] or Mau Rincon at [phone]

## 2022-05-11 NOTE — PROGRESS NOTES
Progress Note - Clem Correa [age] y o  female MRN: [id number]  Unit/Bed#: [facility] 220-02 Encounter: [id number]    Assessment/Plan   Principal Problem:    Severe episode of recurrent major depressive disorder ([address] )  Active Problems:    Substance use      Behavior over the last 24 hours:  Improving  Sleep: normal  Appetite: normal  Medication side effects: No  ROS: no complaints and all other systems are negative    Amy was seen this morning for psychiatric follow-up  She was bright, calm, and cooperative throughout encounter  She reports feeling much better and rates her depression as 5/10  Reports significant improvement with anxiety since addition of BuSpar to medication regimen  No longer endorsing SI and was forward thinking with 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  Amy also verbalized her motivation and desire for inpatient rehab and sobriety  Described her mother, sister, and best friend as strong supports and daughter as strong protective factor against suicide  Thoughts were linear, logical, and goal-directed with no delusional content verbalized  Amy is less withdrawn to room and social with peers  Attended and participated in group this morning appropriately  Remains compliant with medications and meals and denies any sleep disturbance  Patient did not appear internally preoccupied and denied AVH      Mental Status Evaluation:  Appearance:  age appropriate, casually dressed and Improved hygiene, well groomed   Behavior:  Bright, cooperative, pleasant   Speech:  normal pitch and normal volume   Mood:  euthymic   Affect:  mood-congruent   Thought Process:  goal directed, logical and Forward thinking, linear   Thought Content:  No overt delusions or paranoia   Perceptual Disturbances: Denies AVH, does not appear internally preoccupied   Risk Potential: Suicidal Ideations none  Homicidal Ideations none  Potential for Aggression No   Sensorium: person, place, time/date and situation   Memory:  recent and remote memory grossly intact   Consciousness:  alert and awake    Attention: attention span and concentration were age appropriate   Insight:  Improving   Judgment: Improving   Gait/Station: normal gait/station and normal balance   Motor Activity: no abnormal movements     Progress Toward Goals:  Improving  Reports improving mood, anxiety, and depression  Verbalizes want for inpatient D&A, specifically at [facility]  No longer endorsing SI in hygiene/self-care significantly improved  Less withdrawn  Will continue with current psychotropic regimen; patient tolerating well  Cm submitting referral to the retreat  At this time, patient is appropriate for discharge and ready for transfer to retreat upon acceptance  Recommended Treatment: Continue with group therapy, milieu therapy and occupational therapy  Risks, benefits and possible side effects of Medications:   Risks, benefits, and possible side effects of medications explained to patient and patient verbalizes understanding        Medications:   all current active meds have been reviewed, continue current psychiatric medications and current meds:   Current Facility-Administered Medications   Medication Dose Route Frequency    acetaminophen (TYLENOL) tablet 650 mg  650 mg Oral Q6H PRN    acetaminophen (TYLENOL) tablet 650 mg  650 mg Oral Q4H PRN    acetaminophen (TYLENOL) tablet 975 mg  975 mg Oral Q6H PRN    aluminum-magnesium hydroxide-simethicone (MYLANTA) oral suspension 30 mL  30 mL Oral Q4H PRN    bacitracin topical ointment 1 small application  1 small application Topical BID PRN    benztropine (COGENTIN) tablet 1 mg  1 mg Oral Q6H PRN    busPIRone (BUSPAR) tablet 10 mg  10 mg Oral TID    doxycycline hyclate (VIBRAMYCIN) capsule 100 mg  100 mg Oral Q12H [facility]    FLUoxetine (PROzac) capsule 40 mg  40 mg Oral Daily    hydrOXYzine HCL (ATARAX) tablet 100 mg  100 mg Oral Q6H PRN  hydrOXYzine HCL (ATARAX) tablet 25 mg  25 mg Oral Q6H PRN Max 4/day    hydrOXYzine HCL (ATARAX) tablet 50 mg  50 mg Oral Q4H PRN Max 4/day    lamoTRIgine (LaMICtal) tablet 25 mg  25 mg Oral HS    meclizine (ANTIVERT) tablet 12 5 mg  12 5 mg Oral Q8H PRN    melatonin tablet 6 mg  6 mg Oral HS    nicotine (NICODERM CQ) 21 mg/24 hr TD 24 hr patch 1 patch  1 patch Transdermal Daily    nicotine polacrilex (NICORETTE) gum 2 mg  2 mg Oral Q2H PRN    OLANZapine (ZyPREXA) tablet 10 mg  10 mg Oral Q3H PRN Max 3/day    Or    OLANZapine (ZyPREXA) IM injection 10 mg  10 mg Intramuscular Q3H PRN Max 3/day    OLANZapine (ZyPREXA) tablet 5 mg  5 mg Oral Q3H PRN Max 6/day    Or    OLANZapine (ZyPREXA) IM injection 5 mg  5 mg Intramuscular Q3H PRN Max 6/day    OLANZapine (ZyPREXA) tablet 2 5 mg  2 5 mg Oral Q3H PRN Max 8/day    polyethylene glycol (MIRALAX) packet 17 g  17 g Oral Daily PRN    traZODone (DESYREL) tablet 100 mg  100 mg Oral HS PRN     Labs: I have personally reviewed all pertinent laboratory/tests results  Counseling / Coordination of Care  Total floor / unit time spent today 30 minutes  Greater than 50% of total time was spent with the patient and / or family counseling and / or coordination of care   A description of the counseling / coordination of care:  Medication education, treatment plan, discharge planning/safety planning, supportive therapy

## 2022-05-11 NOTE — NURSING NOTE
Patient reported Atarax effective  Patient remained isolative to room throughout the evening  Denied any unmet needs  Reported she was cold on the unit and preferred to stay under blankets in bed  Patient was HS medication compliant  Trazodone prn given  Will continue to monitor

## 2022-05-11 NOTE — PROGRESS NOTES
05/11/22 0800   Activity/Group Checklist   Group Community meeting   Attendance Attended   Attendance Duration (min) 16-30   Interactions Interacted appropriately   Affect/Mood Appropriate;Normal range   Goals Achieved Able to self-disclose; Identified feelings; Able to listen to others; Able to engage in interactions; Able to recieve feedback; Able to give feedback to another
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
## 2022-08-26 ENCOUNTER — HOSPITAL ENCOUNTER (EMERGENCY)
Facility: HOSPITAL | Age: 42
Discharge: HOME | End: 2022-08-26
Attending: EMERGENCY MEDICINE
Payer: OTHER GOVERNMENT

## 2022-08-26 VITALS
DIASTOLIC BLOOD PRESSURE: 81 MMHG | SYSTOLIC BLOOD PRESSURE: 156 MMHG | BODY MASS INDEX: 19.7 KG/M2 | OXYGEN SATURATION: 99 % | RESPIRATION RATE: 16 BRPM | HEIGHT: 68 IN | TEMPERATURE: 96.9 F | WEIGHT: 130 LBS | HEART RATE: 79 BPM
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
## 2022-08-26 DIAGNOSIS — T74.21XA SEXUAL ASSAULT OF ADULT, INITIAL ENCOUNTER: Primary | ICD-10-CM

## 2022-08-26 PROCEDURE — 99285 EMERGENCY DEPT VISIT HI MDM: CPT

## 2022-08-26 PROCEDURE — 99282 EMERGENCY DEPT VISIT SF MDM: CPT | Mod: 25

## 2022-08-26 RX ORDER — ACETAMINOPHEN 325 MG/1
650 TABLET ORAL ONCE
Status: DISCONTINUED | OUTPATIENT
Start: 2022-08-26 | End: 2022-08-26 | Stop reason: HOSPADM
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
## 2022-08-26 NOTE — ED ATTESTATION NOTE
I, Dr. Mills, was personally available for consultation in the Emergency Department. I have reviewed and agree with the assessment, treatment plan and disposition of the patient as recorded by the MLP.      Mariah Mills MD  08/26/22 0602
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
## 2022-09-02 ASSESSMENT — ENCOUNTER SYMPTOMS
EYE PAIN: 0
VOMITING: 0
NUMBNESS: 0
DIZZINESS: 0
ABDOMINAL PAIN: 0
NECK PAIN: 0
FEVER: 0
NAUSEA: 0
CHILLS: 0
COUGH: 0
SHORTNESS OF BREATH: 0
SORE THROAT: 0
BRUISES/BLEEDS EASILY: 0
DYSURIA: 0

## 2022-09-21 ENCOUNTER — OFFICE VISIT (OUTPATIENT)
Dept: URGENT CARE | Facility: MEDICAL CENTER | Age: 42
End: 2022-09-21
Payer: COMMERCIAL

## 2022-09-21 VITALS
HEART RATE: 76 BPM | DIASTOLIC BLOOD PRESSURE: 84 MMHG | RESPIRATION RATE: 16 BRPM | TEMPERATURE: 99.8 F | OXYGEN SATURATION: 100 % | SYSTOLIC BLOOD PRESSURE: 124 MMHG

## 2022-09-21 DIAGNOSIS — J02.9 ACUTE PHARYNGITIS, UNSPECIFIED ETIOLOGY: Primary | ICD-10-CM

## 2022-09-21 PROCEDURE — 99213 OFFICE O/P EST LOW 20 MIN: CPT | Performed by: PHYSICIAN ASSISTANT

## 2022-09-21 RX ORDER — AZITHROMYCIN 250 MG/1
TABLET, FILM COATED ORAL
Qty: 6 TABLET | Refills: 0 | Status: SHIPPED | OUTPATIENT
Start: 2022-09-21 | End: 2022-09-25

## 2022-09-21 NOTE — PROGRESS NOTES
[facility]        NAME: Peri Jones is a 37 y o  female  : 1980    MRN: [id number]  DATE: 2022  TIME: 9:35 AM    /84   Pulse 76   Temp 99 8 °F (37 7 °C) (Tympanic)   Resp 16   LMP 2022   SpO2 100%     Assessment and Plan   Acute pharyngitis, unspecified etiology [J02 9]  1  Acute pharyngitis, unspecified etiology  azithromycin (ZITHROMAX) 250 mg tablet         Patient Instructions       Follow up with PCP in 3-5 days  Proceed to  ER if symptoms worsen  Chief Complaint     Chief Complaint   Patient presents with    Sore Throat     Patient relates started with a sore throat and fever 102 0 max x3 days  Patient took a rapid covid test at home x2 days ago and negative  States daughter was sick with mouth and foot disease last week  History of Present Illness       Pt with sore throat body aches fever, home covid test negative      Review of Systems   Review of Systems   Constitutional: Negative  HENT: Positive for rhinorrhea and sore throat  Eyes: Negative  Respiratory: Negative  Cardiovascular: Negative  Gastrointestinal: Negative  Endocrine: Negative  Genitourinary: Negative  Musculoskeletal: Negative  Skin: Negative  Allergic/Immunologic: Negative  Neurological: Negative  Hematological: Negative  Psychiatric/Behavioral: Negative  All other systems reviewed and are negative          Current Medications       Current Outpatient Medications:     azithromycin (ZITHROMAX) 250 mg tablet, Take 2 tablets today then 1 tablet daily x 4 days, Disp: 6 tablet, Rfl: 0    Calcium Carb-Cholecalciferol 1000-800 MG-UNIT TABS, daily , Disp: , Rfl:     clonazePAM (KlonoPIN) 0 5 mg tablet, Take 1 tablet (0 5 mg total) by mouth 2 (two) times a day as needed for anxiety, Disp: 60 tablet, Rfl: 1    Multiple Vitamins-Minerals (MULTIVITAMIN ADULT PO), Take by mouth daily, Disp: , Rfl:     Omega-3 Fatty Acids (FISH OIL) 1,000 mg, Take 1,000 mg by mouth daily , Disp: , Rfl:     Turmeric 500 MG TABS, Take by mouth, Disp: , Rfl:     busPIRone (BUSPAR) 10 mg tablet, Take 1 tablet (10 mg total) by mouth in the morning and 1 tablet (10 mg total) in the evening and 1 tablet (10 mg total) before bedtime  , Disp: 90 tablet, Rfl: 0    escitalopram (LEXAPRO) 10 mg tablet, Take 1 tablet (10 mg total) by mouth daily (Patient not taking: No sig reported), Disp: 90 tablet, Rfl: 3    FLUoxetine (PROzac) 40 MG capsule, Take 1 capsule (40 mg total) by mouth in the morning , Disp: 30 capsule, Rfl: 0    fluticasone (FLONASE) 50 mcg/act nasal spray, 1 spray into each nostril daily (Patient not taking: No sig reported), Disp: 48 g, Rfl: 3    lamoTRIgine (LaMICtal) 25 mg tablet, Take 1 tablet (25 mg total) by mouth daily at bedtime, Disp: 30 tablet, Rfl: 0    Magnesium Citrate 100 MG TABS, Take 296 mL by mouth daily  (Patient not taking: Reported on 9/21/2022), Disp: , Rfl:     nicotine (NICODERM CQ) 21 mg/24 hr TD 24 hr patch, Place 1 patch on the skin in the morning  (Patient not taking: Reported on 9/21/2022), Disp: 28 patch, Rfl: 0    nicotine polacrilex (NICORETTE) 2 mg gum, Chew 1 each (2 mg total)  as needed in the morning for smoking cessation   (Patient not taking: Reported on 9/21/2022), Disp: 100 each, Rfl: 0    polyethylene glycol (GLYCOLAX) 17 GM/SCOOP powder, Take 17 g by mouth daily (Patient not taking: No sig reported), Disp: , Rfl:     Current Allergies     Allergies as of 09/21/2022 - Reviewed 09/21/2022   Allergen Reaction Noted    Cephalexin Nausea Only 05/24/2021    Penicillins Other (See Comments) 05/02/2022    Vancomycin Hives 10/19/2020    Penicillins Rash 11/28/2016    Wheat bran - food allergy GI Intolerance and Rash 12/01/2001            The following portions of the patient's history were reviewed and updated as appropriate: allergies, current medications, past family history, past medical history, past social history, past surgical history and problem list      Past Medical History:   Diagnosis Date    Anxiety     Attention deficit hyperactivity disorder (ADHD), predominantly inattentive type 7/31/2015    Cystic fibrosis carrier 5/24/2021    Formatting of this note might be different from the original  Partner declined Testing    IBS (irritable bowel syndrome)     Panic attacks 10/1/2021    Raynaud's disease     Recurrent major depressive disorder, in full remission ([facility] [address] ) 11/5/2018    Restless leg syndrome 11/5/2018    UTI (urinary tract infection)     UTI group B Strep x 2 5 months ago       Past Surgical History:   Procedure Laterality Date    AUGMENTATION BREAST  2013    AUGMENTATION MAMMAPLASTY      2 capsular contractures on left corrected, most recent 2015   SHOULDER SURGERY Right     Two surgeries to repair recurrent dislocation    WISDOM TOOTH EXTRACTION         Family History   Problem Relation Age of Onset    Cirrhosis Father     No Known Problems Mother     Diabetes Father     No Known Problems Sister     No Known Problems Daughter     No Known Problems Maternal Grandmother     No Known Problems Paternal Grandmother     No Known Problems Maternal Aunt     Breast cancer Neg Hx          Medications have been verified  Objective   /84   Pulse 76   Temp 99 8 °F (37 7 °C) (Tympanic)   Resp 16   LMP 09/08/2022   SpO2 100%        Physical Exam     Physical Exam  Vitals and nursing note reviewed  Constitutional:       Appearance: She is well-developed and normal weight  Comments: Pt declines office covid testing   HENT:      Head: Normocephalic and atraumatic  Right Ear: Tympanic membrane and ear canal normal       Left Ear: Tympanic membrane and ear canal normal       Mouth/Throat:      Mouth: Mucous membranes are moist  Mucous membranes are pale  Pharynx: Oropharynx is clear  Posterior oropharyngeal erythema present        Tonsils: No tonsillar exudate or tonsillar abscesses  Eyes:      Conjunctiva/sclera: Conjunctivae normal       Pupils: Pupils are equal, round, and reactive to light  Cardiovascular:      Rate and Rhythm: Normal rate and regular rhythm  Heart sounds: Normal heart sounds  Pulmonary:      Effort: Pulmonary effort is normal       Breath sounds: Normal breath sounds  Abdominal:      General: Bowel sounds are normal       Palpations: Abdomen is soft  Musculoskeletal:      Cervical back: Normal range of motion and neck supple  Lymphadenopathy:      Cervical: Cervical adenopathy present  Skin:     General: Skin is warm  Capillary Refill: Capillary refill takes less than 2 seconds  Neurological:      General: No focal deficit present  Mental Status: She is alert     Psychiatric:         Mood and Affect: Mood normal

## 2022-10-14 ENCOUNTER — TELEPHONE (OUTPATIENT)
Dept: PSYCHIATRY | Facility: CLINIC | Age: 42
End: 2022-10-14

## 2022-10-14 ENCOUNTER — NURSE TRIAGE (OUTPATIENT)
Dept: OTHER | Facility: OTHER | Age: 42
End: 2022-10-14

## 2022-10-14 NOTE — TELEPHONE ENCOUNTER
Patient called in stating over the last month she was been drinking everyday on avg 3 a day  States she thinks she is having withdrawal symptoms  States monitoring BP no concerns there  States headache, chills, sweats, and nausea   Going through stressful time  She would like to be seen but office has no availability  Advised UC and provided SHARE #   Requesting office to call to see if they are able to have her seen today, preferably by Dr Patty Alexander   Reason for Disposition  • Patient wants to be seen    Answer Assessment - Initial Assessment Questions  1  DO YOU DRINK: "Do you drink alcohol, including beer, wine or hard liquor?"     Wine or vodka  2  HOW OFTEN: "How many days per week do you typically drink alcohol?"      3 times daily   3  HOW MUCH: "How many drinks do you typically have on days when you drink?" (1 5 oz hard liquor [one shot or jigger; 45 ml], 5 oz wine [small glass; 150 ml], 12 oz beer [one can; 360 ml])    3 days   4  MOST: "What is the most that you have had to drink on any one occasion in the last month?"     Can drinki more maybe once a week   5  LAST 24 HOURS: "Have you had a drink within the last 24 hours?"      yes  6  DRINKING PROBLEM: "Do you have or have you ever had a drinking problem?"      Younger in 25s going through divorce  7  DRUG PROBLEM: "Are you using any other drugs?" (e g , yes/no; cocaine, prescription medications, etc )   denies   8  SYMPTOMS: "What symptoms are you currently experiencing?" (e g , none, tremors, or shakiness, abdominal pain, vomiting, blackout spells)      Headache chills and nausea sweats  9  DETOX PROGRAM: "Have you ever gone through a detox program?"      Yes but not for alcholol  10  THERAPIST: "Do you have a counselor or therapist? Name?"        yes  11   SUPPORT: "Who is with you now?" "Who do you live with?" "Do you have family or friends who you can talk to?" "Are you a member of Alcoholics Anonymous?"        To an extent    Protocols used: ALCOHOL USE AND PROBLEMS-ADULT-[place]

## 2022-10-14 NOTE — TELEPHONE ENCOUNTER
I spoke to patient and made appointment for Monday with you and advise to go to the emergency room or urgent care if she gets any more sick symptoms   She seemed happy and calm

## 2022-10-14 NOTE — TELEPHONE ENCOUNTER
Patient left voicemail requesting information due to high alcohol intake  Returned patient call and reviewed intake questions  [facility] Program Intake Questions       Referred by: Self    Please advise interviewee that they need to answer all questions truthfully to allow for best care and any misrepresentations of information may affect their ability to be seen at this clinic     Was this discussed? Yes      [facility] Intake History -        Presenting Problem (in patient's words): "Two months ago I drank a glass of wine after going through a divorce, and now sometimes I have to drink alcohol before work due to the shakes "       Are there any developmental disabilities? No     Does the patient have a language barrier or hearing impairment? No       Substance Use-       1  Are you being referred for treatment of any of the following: alcohol, benzodiazepines, baclofen, GHB, phenibut, or Soma? Alcohol      2  Are you being referred for the treatment of opioid use? No      3  For what substance use are you being referred to the [facility]? Alcohol        Psychiatric Care-        Do you have a psychiatric diagnosis? ADHD, anxiety and depression, bipolar  2   Are you taking any psychiatric medications? Prozac, Wellbutrin, Lamictal,     3  Have you been treated at [facility] by a therapist or a doctor in the past? If yes, who? No    4  Are you currently having thoughts of harming yourself or others? No    5  Have you been hospitalized for mental health? Yes    6  Do you have a community treatment team or ? Has been to [facility]       Legal History-         Do you have any history of arrests, detention/intermediate time, or DUIs? No    Prenatal/         Are you pregnant? No       Have you given birth in the last 28 days?  No       Intake Team, please check with provider before scheduling if flags come up such as:     - complex case     - legal history (other than DUI)     - communication barrier concerns are present     - already being prescribed buprenorphine or methadone     - if, in your judgment, this needs further review        ACCEPTED as a patient Yes  Appointment Date: 10/20/2022 at 8:00am      Referred Elsewhere? Referred her to the ED for evaluation due to currently having withdrawal symptoms - Does not feel as if she can wait to be seen            Name of Insurance Co:     Insurance ID#     [employer] #     If ins is primary or secondary

## 2022-10-17 ENCOUNTER — OFFICE VISIT (OUTPATIENT)
Dept: FAMILY MEDICINE CLINIC | Facility: CLINIC | Age: 42
End: 2022-10-17
Payer: COMMERCIAL

## 2022-10-17 VITALS
SYSTOLIC BLOOD PRESSURE: 122 MMHG | WEIGHT: 146.2 LBS | BODY MASS INDEX: 22.95 KG/M2 | TEMPERATURE: 97.7 F | OXYGEN SATURATION: 98 % | HEART RATE: 73 BPM | HEIGHT: 67 IN | DIASTOLIC BLOOD PRESSURE: 78 MMHG

## 2022-10-17 DIAGNOSIS — F43.9 SITUATIONAL STRESS: ICD-10-CM

## 2022-10-17 DIAGNOSIS — F33.2 SEVERE EPISODE OF RECURRENT MAJOR DEPRESSIVE DISORDER (HCC): ICD-10-CM

## 2022-10-17 DIAGNOSIS — F10.930 ALCOHOL WITHDRAWAL SYNDROME WITHOUT COMPLICATION (HCC): Primary | ICD-10-CM

## 2022-10-17 DIAGNOSIS — F43.10 PTSD (POST-TRAUMATIC STRESS DISORDER): ICD-10-CM

## 2022-10-17 DIAGNOSIS — F41.9 ANXIETY: ICD-10-CM

## 2022-10-17 PROCEDURE — 99214 OFFICE O/P EST MOD 30 MIN: CPT | Performed by: FAMILY MEDICINE

## 2022-10-17 RX ORDER — BUSPIRONE HYDROCHLORIDE 10 MG/1
20 TABLET ORAL 2 TIMES DAILY
Qty: 120 TABLET | Refills: 0 | Status: SHIPPED | OUTPATIENT
Start: 2022-10-17 | End: 2022-11-16

## 2022-10-17 RX ORDER — PROPRANOLOL HYDROCHLORIDE 20 MG/1
TABLET ORAL
COMMUNITY
Start: 2022-09-25

## 2022-10-17 RX ORDER — LAMOTRIGINE 100 MG/1
TABLET ORAL
COMMUNITY
Start: 2022-10-08

## 2022-10-17 RX ORDER — FLUOXETINE HYDROCHLORIDE 20 MG/1
CAPSULE ORAL
COMMUNITY
Start: 2022-10-08

## 2022-10-17 RX ORDER — CLONAZEPAM 0.5 MG/1
0.5 TABLET ORAL 2 TIMES DAILY PRN
Qty: 60 TABLET | Refills: 1 | Status: SHIPPED | OUTPATIENT
Start: 2022-10-17

## 2022-10-17 RX ORDER — BUPROPION HYDROCHLORIDE 300 MG/1
TABLET ORAL
COMMUNITY
Start: 2022-09-25

## 2022-10-17 NOTE — PROGRESS NOTES
Chief Complaint   Patient presents with   • Follow-up     Drinking daily/anxiety        HPI   Here because of anxiety and alcohol  She has had a rough year  Stresses include marital discord and impending divorce, Lyme disease  She also has an emotional breakdown  Hospitalized last December for depression and suicidal ideation  Hospitalized again in April after she crashed trying to get off her medication  Same place, the retreat  She was using Kratom and delta THC  Each day was for 1 month long  Very helpful for her  Not happy with the [facility]  Current medications include 20 mg Prozac, 300 mg Wellbutrin, 100 mg Lamictal at bedtime, propranolol 20 mg p r n ,   Klonopin was stopped  Also taken off her ADHD meds  Six weeks ago, started drinking and is drinking daily  Mostly wine  1-2 bottles a day  Treating her anxiety with alcohol   currently in rehab  She is full-time mom and working full-time  Past Medical History:   Diagnosis Date   • Anxiety    • Attention deficit hyperactivity disorder (ADHD), predominantly inattentive type 7/31/2015   • Cystic fibrosis carrier 5/24/2021    Formatting of this note might be different from the original  Partner declined Testing   • IBS (irritable bowel syndrome)    • Panic attacks 10/1/2021   • PTSD (post-traumatic stress disorder) 10/17/2022   • Raynaud's disease    • Recurrent [facility] ) 11/5/2018   • Restless leg syndrome 11/5/2018   • UTI (urinary tract infection)     UTI group B Strep x 2 5 months ago        Past Surgical History:   Procedure Laterality Date   • AUGMENTATION BREAST  2013   • AUGMENTATION MAMMAPLASTY      2 capsular contractures on left corrected, most recent 2015     • SHOULDER SURGERY Right     Two surgeries to repair recurrent dislocation   • WISDOM TOOTH EXTRACTION         Social History     Tobacco Use   • Smoking status: Never Smoker   • Smokeless tobacco: Never Used Substance Use Topics   • Alcohol use: Not Currently       Social History     Social History Narrative    ** Merged History Encounter **         From 3/7/14:  Anya Connell back from [place] after 8 years   in [id number] to [place]  Together since 2013  Daughter, Cassondra Severin, 3 as of 5/21  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- [employer]  Became a certifi    ed holistic health   Enjoys hiking, yoga  The following portions of the patient's history were reviewed and updated as appropriate: allergies, current medications, past family history, past medical history, past social history, past surgical history and problem list       Review of Systems       /78 (BP Location: Left arm, Patient Position: Sitting, Cuff Size: Standard)   Pulse 73   Temp 97 7 °F (36 5 °C) (Temporal)   Ht 5' 7" (1 702 m)   Wt 66 3 kg (146 lb 3 2 oz)   SpO2 98%   BMI 22 90 kg/m²      Physical Exam     Appears well  Were composed with good judgment than in previous visit a year ago when she was somewhat out there and paranoid    CIWA score 4-5              Current Outpatient Medications:   •  buPROPion (WELLBUTRIN XL) 300 mg 24 hr tablet, , Disp: , Rfl:   •  busPIRone (BUSPAR) 10 mg tablet, Take 2 tablets (20 mg total) by mouth 2 (two) times a day, Disp: 120 tablet, Rfl: 0  •  Calcium Carb-Cholecalciferol 1000-800 MG-UNIT TABS, daily , Disp: , Rfl:   •  clonazePAM (KlonoPIN) 0 5 mg tablet, Take 1 tablet (0 5 mg total) by mouth 2 (two) times a day as needed for anxiety, Disp: 60 tablet, Rfl: 1  •  FLUoxetine (PROzac) 20 mg capsule, , Disp: , Rfl:   •  lamoTRIgine (LaMICtal) 100 mg tablet, , Disp: , Rfl:   •  Multiple Vitamins-Minerals (MULTIVITAMIN ADULT PO), Take by mouth daily, Disp: , Rfl:   •  Omega-3 Fatty Acids (FISH OIL) 1,000 mg, Take 1,000 mg by mouth daily , Disp: , Rfl:   •  propranolol (INDERAL) 20 mg tablet, , Disp: , Rfl:   •  Turmeric 500 MG TABS, Take by mouth, Disp: , Rfl:   •  escitalopram (LEXAPRO) 10 mg tablet, Take 1 tablet (10 mg total) by mouth daily (Patient not taking: No sig reported), Disp: 90 tablet, Rfl: 3     No problem-specific Assessment & Plan notes found for this encounter  Diagnoses and all orders for this visit:    Alcohol withdrawal syndrome without complication (HCC)    Anxiety  -     busPIRone (BUSPAR) 10 mg tablet; Take 2 tablets (20 mg total) by mouth 2 (two) times a day  -     clonazePAM (KlonoPIN) 0 5 mg tablet; Take 1 tablet (0 5 mg total) by mouth 2 (two) times a day as needed for anxiety    Situational stress    PTSD (post-traumatic stress disorder)    Severe episode of recurrent major depressive disorder (HCC)  -     busPIRone (BUSPAR) 10 mg tablet; Take 2 tablets (20 mg total) by mouth 2 (two) times a day    Other orders  -     buPROPion (WELLBUTRIN XL) 300 mg 24 hr tablet  -     FLUoxetine (PROzac) 20 mg capsule  -     lamoTRIgine (LaMICtal) 100 mg tablet  -     propranolol (INDERAL) 20 mg tablet        Patient Instructions    Suffering from alcohol withdrawal but not bad enough for inpatient treatment  Will use Klonopin 0 5 mg twice daily  She is willing to go to [place] [id number] meetings to help her with her alcohol use  Increase buspirone to 20 mg twice daily  Continue Prozac and bupropion and Lamictal     Nice weight gain  She looks much better!      Observation of how she is doing without Adderall for the time being  Recheck in 2 weeks

## 2022-10-17 NOTE — PATIENT INSTRUCTIONS
Suffering from alcohol withdrawal but not bad enough for inpatient treatment  Will use Klonopin 0 5 mg twice daily  She is willing to go to [place] [id number] meetings to help her with her alcohol use  Increase buspirone to 20 mg twice daily  Continue Prozac and bupropion and Lamictal     Nice weight gain  She looks much better!      Observation of how she is doing without Adderall for the time being  Recheck in 2 weeks

## 2022-12-09 ENCOUNTER — TELEPHONE (OUTPATIENT)
Dept: OTHER | Facility: OTHER | Age: 42
End: 2022-12-09

## 2022-12-09 NOTE — TELEPHONE ENCOUNTER
Patient is calling regarding cancelling an appointment      Date/Time:12/9/22 at 11am    Patient was rescheduled: YES [] NO [x]    Patient requesting call back to reschedule: YES [x] NO []

## 2023-01-04 ENCOUNTER — OFFICE VISIT (OUTPATIENT)
Dept: FAMILY MEDICINE CLINIC | Facility: CLINIC | Age: 43
End: 2023-01-04

## 2023-01-04 VITALS
HEIGHT: 67 IN | TEMPERATURE: 98 F | OXYGEN SATURATION: 96 % | WEIGHT: 138.38 LBS | BODY MASS INDEX: 21.72 KG/M2 | RESPIRATION RATE: 16 BRPM | HEART RATE: 82 BPM | SYSTOLIC BLOOD PRESSURE: 110 MMHG | DIASTOLIC BLOOD PRESSURE: 72 MMHG

## 2023-01-04 DIAGNOSIS — F41.9 ANXIETY: Primary | ICD-10-CM

## 2023-01-04 DIAGNOSIS — F90.0 ATTENTION DEFICIT HYPERACTIVITY DISORDER (ADHD), PREDOMINANTLY INATTENTIVE TYPE: ICD-10-CM

## 2023-01-04 DIAGNOSIS — F33.1 MODERATE EPISODE OF RECURRENT MAJOR DEPRESSIVE DISORDER (HCC): ICD-10-CM

## 2023-01-04 PROBLEM — R10.11 RIGHT UPPER QUADRANT ABDOMINAL PAIN: Status: RESOLVED | Noted: 2021-08-23 | Resolved: 2023-01-04

## 2023-01-04 RX ORDER — ALBUTEROL SULFATE 90 UG/1
AEROSOL, METERED RESPIRATORY (INHALATION)
COMMUNITY
Start: 2022-12-28

## 2023-01-04 RX ORDER — CLONAZEPAM 0.5 MG/1
0.5 TABLET ORAL 2 TIMES DAILY PRN
Qty: 60 TABLET | Refills: 1 | Status: SHIPPED | OUTPATIENT
Start: 2023-01-04

## 2023-01-04 RX ORDER — DEXTROAMPHETAMINE SACCHARATE, AMPHETAMINE ASPARTATE MONOHYDRATE, DEXTROAMPHETAMINE SULFATE AND AMPHETAMINE SULFATE 2.5; 2.5; 2.5; 2.5 MG/1; MG/1; MG/1; MG/1
10 CAPSULE, EXTENDED RELEASE ORAL EVERY MORNING
Qty: 30 CAPSULE | Refills: 0 | Status: SHIPPED | OUTPATIENT
Start: 2023-01-04

## 2023-01-04 NOTE — PROGRESS NOTES
Chief Complaint   Patient presents with   • Follow-up   • Shortness of Breath   • Chest Pain        HPI   Here for follow-up of anxiety, situational stress, alcohol use, and depression  And seen 3 months ago, had started drinking to deal with stress  Was able to stop drinking  Has been on bupropion and fluoxetine and Lamictal but she stopped them all  Says that her crazy head is better  Stress levels are less  Says things are going well  Is going well  Started to see someone who treats her like a lady  50-50 custody as far as her child  Has been moved about 75 minutes away to [facility]  He is seeing someone  For the last 2 days, bothered by dizziness and a sensation that she cannot get enough air in  No fever  Does have some congestion  Had an episode where she passed out  Presently taking propranolol as needed and clonazepam as needed  Emotionally, feels the same since she stopped her antidepressants although her ADHD is worse  Difficulty completing tasks at work  Has been on Vyvanse once  Got it from a friend  Does not remember the dose  Anxiety is better than its been for a long time  Fluoxetine killed her sex drive  Currently not doing any counseling  On 3 waiting lists  Has not had any panic attacks      Past Medical History:   Diagnosis Date   • Anxiety    • Attention deficit hyperactivity disorder (ADHD), predominantly inattentive type 7/31/2015   • Cystic fibrosis carrier 5/24/2021    Formatting of this note might be different from the original  Partner declined Testing   • IBS (irritable bowel syndrome)    • Panic attacks 10/1/2021   • PTSD (post-traumatic stress disorder) 10/17/2022   • Raynaud's disease    • Recurrent major depressive disorder, in full remission ([facility] ) 11/5/2018   • Restless leg syndrome 11/5/2018   • UTI (urinary tract infection)     UTI group B Strep x 2 5 months ago        Past Surgical History:   Procedure Laterality Date   • AUGMENTATION BREAST  2013   • AUGMENTATION MAMMAPLASTY      2 capsular contractures on left corrected, most recent 2015  • SHOULDER SURGERY Right     Two surgeries to repair recurrent dislocation   • WISDOM TOOTH EXTRACTION         Social History     Tobacco Use   • Smoking status: Never   • Smokeless tobacco: Never   Substance Use Topics   • Alcohol use: Not Currently       Social History     Social History Narrative     in [address] to [place]  Together since 2013  Daughter, Jennifer Powers, 5 as of 1/23  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  Became a certified holistic health   Enjoys hiking, yoga  The following portions of the patient's history were reviewed and updated as appropriate: allergies, current medications, past family history, past medical history, past social history, past surgical history and problem list       Review of Systems       /72   Pulse 82   Temp 98 °F (36 7 °C)   Resp 16   Ht 5' 7" (1 702 m)   Wt 62 8 kg (138 lb 6 oz)   SpO2 96%   BMI 21 67 kg/m²      Physical Exam   Appears well overall slightly anxious  Eardrums are white  Throat is benign  Lungs are clear  Heart mildly tachycardic  No murmur                  Current Outpatient Medications:   •  albuterol (PROVENTIL HFA,VENTOLIN HFA) 90 mcg/act inhaler, , Disp: , Rfl:   •  amphetamine-dextroamphetamine (ADDERALL XR, 10MG,) 10 MG 24 hr capsule, Take 1 capsule (10 mg total) by mouth every morning Max Daily Amount: 10 mg, Disp: 30 capsule, Rfl: 0  •  Calcium Carb-Cholecalciferol 1000-800 MG-UNIT TABS, daily , Disp: , Rfl:   •  clonazePAM (KlonoPIN) 0 5 mg tablet, Take 1 tablet (0 5 mg total) by mouth 2 (two) times a day as needed for anxiety, Disp: 60 tablet, Rfl: 1  •  Multiple Vitamins-Minerals (MULTIVITAMIN ADULT PO), Take by mouth daily, Disp: , Rfl:   •  Omega-3 Fatty Acids (FISH OIL) 1,000 mg, Take 1,000 mg by mouth daily , Disp: , Rfl:   •  propranolol (INDERAL) 20 mg tablet, , Disp: , Rfl: •  Turmeric 500 MG TABS, Take by mouth, Disp: , Rfl:      No problem-specific Assessment & Plan notes found for this encounter  Diagnoses and all orders for this visit:    Anxiety  -     clonazePAM (KlonoPIN) 0 5 mg tablet; Take 1 tablet (0 5 mg total) by mouth 2 (two) times a day as needed for anxiety    Moderate episode of recurrent major depressive disorder (HCC)    Attention deficit hyperactivity disorder (ADHD), predominantly inattentive type  -     amphetamine-dextroamphetamine (ADDERALL XR, 10MG,) 10 MG 24 hr capsule; Take 1 capsule (10 mg total) by mouth every morning Max Daily Amount: 10 mg    Other orders  -     albuterol (PROVENTIL HFA,VENTOLIN HFA) 90 mcg/act inhaler        Patient Instructions   Suspect her shortness of breath and chest pain is more related to anxiety  Side effects from Prozac  Suggest clonazepam 0 5 mg twice daily as needed  Does have a history of ADHD and struggling at work  Trial of Adderall 10 mg once daily in the morning  Propranolol to be used as needed for tremor  Will hold off on further use of antidepressants and okay as he moves forward with her life  Recheck in 1 month

## 2023-01-04 NOTE — PATIENT INSTRUCTIONS
Suspect her shortness of breath and chest pain is more related to anxiety  Side effects from Prozac  Suggest clonazepam 0 5 mg twice daily as needed  Does have a history of ADHD and struggling at work  Trial of Adderall 10 mg once daily in the morning  Propranolol to be used as needed for tremor  Will hold off on further use of antidepressants and okay as he moves forward with her life  Recheck in 1 month

## 2023-01-11 ENCOUNTER — TELEPHONE (OUTPATIENT)
Dept: FAMILY MEDICINE CLINIC | Facility: CLINIC | Age: 43
End: 2023-01-11

## 2023-01-11 DIAGNOSIS — F90.0 ATTENTION DEFICIT HYPERACTIVITY DISORDER (ADHD), PREDOMINANTLY INATTENTIVE TYPE: Primary | ICD-10-CM

## 2023-01-11 RX ORDER — DEXTROAMPHETAMINE SACCHARATE, AMPHETAMINE ASPARTATE, DEXTROAMPHETAMINE SULFATE AND AMPHETAMINE SULFATE 1.25; 1.25; 1.25; 1.25 MG/1; MG/1; MG/1; MG/1
5 TABLET ORAL
Qty: 30 TABLET | Refills: 0 | Status: SHIPPED | OUTPATIENT
Start: 2023-01-11

## 2023-01-11 NOTE — TELEPHONE ENCOUNTER
Patient called and stated that she feels like the adderral is working however she is starting to crash around 2 or 3 pm and was wondering if she would be able to either take a 2nd dose in the afternoon or increase the dosage    Please call patient to advise

## 2023-01-11 NOTE — TELEPHONE ENCOUNTER
Advise that 5mg Adderall has been added  To use in the afternoon  Prescription sent to her drugstore

## 2023-02-07 DIAGNOSIS — F90.0 ATTENTION DEFICIT HYPERACTIVITY DISORDER (ADHD), PREDOMINANTLY INATTENTIVE TYPE: ICD-10-CM

## 2023-02-07 RX ORDER — DEXTROAMPHETAMINE SACCHARATE, AMPHETAMINE ASPARTATE, DEXTROAMPHETAMINE SULFATE AND AMPHETAMINE SULFATE 1.25; 1.25; 1.25; 1.25 MG/1; MG/1; MG/1; MG/1
5 TABLET ORAL
Qty: 30 TABLET | Refills: 0 | Status: SHIPPED | OUTPATIENT
Start: 2023-02-07 | End: 2023-02-14

## 2023-02-07 RX ORDER — DEXTROAMPHETAMINE SACCHARATE, AMPHETAMINE ASPARTATE MONOHYDRATE, DEXTROAMPHETAMINE SULFATE AND AMPHETAMINE SULFATE 2.5; 2.5; 2.5; 2.5 MG/1; MG/1; MG/1; MG/1
10 CAPSULE, EXTENDED RELEASE ORAL EVERY MORNING
Qty: 30 CAPSULE | Refills: 0 | Status: SHIPPED | OUTPATIENT
Start: 2023-02-07 | End: 2023-02-14

## 2023-02-08 DIAGNOSIS — F41.9 ANXIETY: ICD-10-CM

## 2023-02-08 RX ORDER — CLONAZEPAM 0.5 MG/1
0.5 TABLET ORAL 2 TIMES DAILY PRN
Qty: 60 TABLET | Refills: 1 | Status: SHIPPED | OUTPATIENT
Start: 2023-02-08

## 2023-02-08 NOTE — TELEPHONE ENCOUNTER
Patient called to refill her clonazepam and wanted to state that her  had passed away last week, she said he was a longtime patient of yours  She feels she will need the clonazepam at this time  Please advise

## 2023-02-14 ENCOUNTER — OFFICE VISIT (OUTPATIENT)
Dept: FAMILY MEDICINE CLINIC | Facility: CLINIC | Age: 43
End: 2023-02-14

## 2023-02-14 VITALS
WEIGHT: 136 LBS | SYSTOLIC BLOOD PRESSURE: 110 MMHG | RESPIRATION RATE: 16 BRPM | BODY MASS INDEX: 21.35 KG/M2 | HEIGHT: 67 IN | OXYGEN SATURATION: 98 % | DIASTOLIC BLOOD PRESSURE: 60 MMHG | TEMPERATURE: 98.2 F | HEART RATE: 71 BPM

## 2023-02-14 DIAGNOSIS — F41.9 ANXIETY: ICD-10-CM

## 2023-02-14 DIAGNOSIS — F33.1 MODERATE EPISODE OF RECURRENT MAJOR DEPRESSIVE DISORDER (HCC): ICD-10-CM

## 2023-02-14 DIAGNOSIS — F90.0 ATTENTION DEFICIT HYPERACTIVITY DISORDER (ADHD), PREDOMINANTLY INATTENTIVE TYPE: Primary | ICD-10-CM

## 2023-02-14 DIAGNOSIS — Z63.4 BEREAVEMENT: ICD-10-CM

## 2023-02-14 RX ORDER — DEXTROAMPHETAMINE SACCHARATE, AMPHETAMINE ASPARTATE MONOHYDRATE, DEXTROAMPHETAMINE SULFATE AND AMPHETAMINE SULFATE 5; 5; 5; 5 MG/1; MG/1; MG/1; MG/1
20 CAPSULE, EXTENDED RELEASE ORAL EVERY MORNING
Qty: 30 CAPSULE | Refills: 0 | Status: SHIPPED | OUTPATIENT
Start: 2023-02-14 | End: 2023-02-15

## 2023-02-14 SDOH — SOCIAL STABILITY - SOCIAL INSECURITY: DISSAPEARANCE AND DEATH OF FAMILY MEMBER: Z63.4

## 2023-02-14 NOTE — PATIENT INSTRUCTIONS
Some discussion about the loss of her estranged   Now a single mom but handling things appropriate  Increase amphetamine to 20 mg daily  Clonazepam as needed  Propranolol for tremor  Call for refill in about 1 month  Recheck in 2 months

## 2023-02-14 NOTE — PROGRESS NOTES
Chief Complaint   Patient presents with   • Medication Management   • Bereavement        2 weeks ago  HPI   Here for follow-up of anxiety, situational stress, alcohol use, and depression  In the last couple of weeks, estranged  overdosed and   She has a new paradigm  Full-time mom  Started on Adderall last visit which wears off  Using clonazepam as needed  Propranolol 4 days a week for tremor which helps her at work  Relationship still going okay  Past Medical History:   Diagnosis Date   • Anxiety    • Attention deficit hyperactivity disorder (ADHD), predominantly inattentive type 2015   • Cystic fibrosis carrier 2021    Formatting of this note might be different from the original  Partner declined Testing   • IBS (irritable bowel syndrome)    • Panic attacks 10/1/2021   • PTSD (post-traumatic stress disorder) 10/17/2022   • Raynaud's disease    • [facility] 2018   • Restless leg syndrome 2018   • UTI (urinary tract infection)     UTI group B Strep x 2 5 months ago        Past Surgical History:   Procedure Laterality Date   • AUGMENTATION BREAST     • AUGMENTATION MAMMAPLASTY      2 capsular contractures on left corrected, most recent   • SHOULDER SURGERY Right     Two surgeries to repair recurrent dislocation   • WISDOM TOOTH EXTRACTION         Social History     Tobacco Use   • Smoking status: Never   • Smokeless tobacco: Never   Substance Use Topics   • Alcohol use: Not Currently       Social History     Social History Narrative     in  to [place]  Together since     after a rough couple of years  He overdosed and    Dating Trista Muse since   Daughter, Sharath Thomas, 5 as of   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  Became a certified holistic health   Enjoys hiking, yoga          The following portions of the patient's history were reviewed and updated as appropriate: allergies, current medications, past family history, past medical history, past social history, past surgical history and problem list       Review of Systems       /60 (BP Location: Left arm, Patient Position: Sitting, Cuff Size: Standard)   Pulse 71   Temp 98 2 °F (36 8 °C) (Temporal)   Resp 16   Ht 5' 7" (1 702 m)   Wt 61 7 kg (136 lb)   LMP 09/08/2022   SpO2 98%   BMI 21 30 kg/m²      Physical Exam   Appears okay considering the recent circumstances  Relaxed  Mood is okay  Current Outpatient Medications:   •  albuterol (PROVENTIL HFA,VENTOLIN HFA) 90 mcg/act inhaler, , Disp: , Rfl:   •  amphetamine-dextroamphetamine (ADDERALL XR, 20MG,) 20 MG 24 hr capsule, Take 1 capsule (20 mg total) by mouth every morning Max Daily Amount: 20 mg, Disp: 30 capsule, Rfl: 0  •  Calcium Carb-Cholecalciferol 1000-800 MG-UNIT TABS, daily , Disp: , Rfl:   •  clonazePAM (KlonoPIN) 0 5 mg tablet, Take 1 tablet (0 5 mg total) by mouth 2 (two) times a day as needed for anxiety, Disp: 60 tablet, Rfl: 1  •  Multiple Vitamins-Minerals (MULTIVITAMIN ADULT PO), Take by mouth daily, Disp: , Rfl:   •  Omega-3 Fatty Acids (FISH OIL) 1,000 mg, Take 1,000 mg by mouth daily , Disp: , Rfl:   •  propranolol (INDERAL) 20 mg tablet, , Disp: , Rfl:   •  Turmeric 500 MG TABS, Take by mouth, Disp: , Rfl:      No problem-specific Assessment & Plan notes found for this encounter  Diagnoses and all orders for this visit:    Attention deficit hyperactivity disorder (ADHD), predominantly inattentive type  -     amphetamine-dextroamphetamine (ADDERALL XR, 20MG,) 20 MG 24 hr capsule; Take 1 capsule (20 mg total) by mouth every morning Max Daily Amount: 20 mg    Anxiety    Moderate episode of recurrent major depressive disorder [facility])    Bereavement        Patient Instructions   Some discussion about the loss of her estranged     Now a single mom but handling things appropriate  Increase amphetamine to 20 mg daily  Clonazepam as needed  Propranolol for tremor  Call for refill in about 1 month  Recheck in 2 months

## 2023-02-15 ENCOUNTER — TELEPHONE (OUTPATIENT)
Dept: FAMILY MEDICINE CLINIC | Facility: CLINIC | Age: 43
End: 2023-02-15

## 2023-02-15 DIAGNOSIS — F90.0 ATTENTION DEFICIT HYPERACTIVITY DISORDER (ADHD), PREDOMINANTLY INATTENTIVE TYPE: Primary | ICD-10-CM

## 2023-02-15 RX ORDER — DEXTROAMPHETAMINE SACCHARATE, AMPHETAMINE ASPARTATE, DEXTROAMPHETAMINE SULFATE AND AMPHETAMINE SULFATE 5; 5; 5; 5 MG/1; MG/1; MG/1; MG/1
20 TABLET ORAL DAILY
Qty: 30 TABLET | Refills: 0 | Status: SHIPPED | OUTPATIENT
Start: 2023-02-15

## 2023-02-15 NOTE — TELEPHONE ENCOUNTER
Amy called to state the extended release Adderall is on backorder and she was wondering if you could call the instant release one into her pharmacy  Please advise

## 2023-03-06 DIAGNOSIS — F90.0 ATTENTION DEFICIT HYPERACTIVITY DISORDER (ADHD), PREDOMINANTLY INATTENTIVE TYPE: ICD-10-CM

## 2023-03-06 RX ORDER — DEXTROAMPHETAMINE SACCHARATE, AMPHETAMINE ASPARTATE, DEXTROAMPHETAMINE SULFATE AND AMPHETAMINE SULFATE 5; 5; 5; 5 MG/1; MG/1; MG/1; MG/1
20 TABLET ORAL DAILY
Qty: 30 TABLET | Refills: 0 | Status: SHIPPED | OUTPATIENT
Start: 2023-03-06 | End: 2023-03-17 | Stop reason: SDUPTHER

## 2023-03-06 RX ORDER — DEXTROAMPHETAMINE SACCHARATE, AMPHETAMINE ASPARTATE, DEXTROAMPHETAMINE SULFATE AND AMPHETAMINE SULFATE 1.25; 1.25; 1.25; 1.25 MG/1; MG/1; MG/1; MG/1
5 TABLET ORAL
Qty: 30 TABLET | Refills: 0 | Status: SHIPPED | OUTPATIENT
Start: 2023-03-06 | End: 2023-03-17 | Stop reason: SDUPTHER

## 2023-03-06 NOTE — TELEPHONE ENCOUNTER
Patient called to request an early refill on her Adderall as she is going out of the country to [place] for ten days on Wednesday  She is requesting a refill on the 20mg which she takes in the morning and also the 5mg which she takes in the evening  I only the saw the 20mg on her profile to fill  Please advise

## 2023-03-17 DIAGNOSIS — F41.9 ANXIETY: ICD-10-CM

## 2023-03-17 DIAGNOSIS — F90.0 ATTENTION DEFICIT HYPERACTIVITY DISORDER (ADHD), PREDOMINANTLY INATTENTIVE TYPE: ICD-10-CM

## 2023-03-18 RX ORDER — DEXTROAMPHETAMINE SACCHARATE, AMPHETAMINE ASPARTATE, DEXTROAMPHETAMINE SULFATE AND AMPHETAMINE SULFATE 1.25; 1.25; 1.25; 1.25 MG/1; MG/1; MG/1; MG/1
5 TABLET ORAL
Qty: 30 TABLET | Refills: 0 | Status: SHIPPED | OUTPATIENT
Start: 2023-03-18

## 2023-03-18 RX ORDER — DEXTROAMPHETAMINE SACCHARATE, AMPHETAMINE ASPARTATE, DEXTROAMPHETAMINE SULFATE AND AMPHETAMINE SULFATE 5; 5; 5; 5 MG/1; MG/1; MG/1; MG/1
20 TABLET ORAL DAILY
Qty: 30 TABLET | Refills: 0 | Status: SHIPPED | OUTPATIENT
Start: 2023-03-18

## 2023-03-18 RX ORDER — CLONAZEPAM 0.5 MG/1
0.5 TABLET ORAL 2 TIMES DAILY PRN
Qty: 60 TABLET | Refills: 1 | Status: SHIPPED | OUTPATIENT
Start: 2023-03-18

## 2023-04-03 ENCOUNTER — TELEPHONE (OUTPATIENT)
Dept: FAMILY MEDICINE CLINIC | Facility: CLINIC | Age: 43
End: 2023-04-03

## 2023-04-03 DIAGNOSIS — F90.0 ATTENTION DEFICIT HYPERACTIVITY DISORDER (ADHD), PREDOMINANTLY INATTENTIVE TYPE: Primary | ICD-10-CM

## 2023-04-03 NOTE — TELEPHONE ENCOUNTER
Trial of Vyvanse 30 mg once daily  [facility]

## 2023-04-03 NOTE — TELEPHONE ENCOUNTER
Patient called requesting to switch from Adderall to Vyvanse  She is inquiring because of the extended release Adderall being on backorder and not liking the immediate release and was thinking the Vyvanse might work better for her  Please advise

## 2023-04-24 ENCOUNTER — RA CDI HCC (OUTPATIENT)
Dept: OTHER | Facility: HOSPITAL | Age: 43
End: 2023-04-24

## 2023-04-24 NOTE — PROGRESS NOTES
[facility]  coding opportunities       Chart reviewed, no opportunity found: CHART REVIEWED, NO OPPORTUNITY FOUND      This is a reminder to address ALL HCC (risk adjustment) codes as found on active problem list for 2023 as patient scores reset to zero ELLIOT    Patients Insurance        Commercial Insurance: [address] [place] [address]

## 2023-05-01 ENCOUNTER — OFFICE VISIT (OUTPATIENT)
Dept: FAMILY MEDICINE CLINIC | Facility: CLINIC | Age: 43
End: 2023-05-01

## 2023-05-01 VITALS
BODY MASS INDEX: 21.44 KG/M2 | HEART RATE: 64 BPM | TEMPERATURE: 97.3 F | SYSTOLIC BLOOD PRESSURE: 118 MMHG | WEIGHT: 136.6 LBS | HEIGHT: 67 IN | OXYGEN SATURATION: 93 % | DIASTOLIC BLOOD PRESSURE: 70 MMHG

## 2023-05-01 DIAGNOSIS — I83.811 VARICOSE VEINS OF RIGHT LOWER EXTREMITY WITH PAIN: ICD-10-CM

## 2023-05-01 DIAGNOSIS — F90.0 ATTENTION DEFICIT HYPERACTIVITY DISORDER (ADHD), PREDOMINANTLY INATTENTIVE TYPE: Primary | ICD-10-CM

## 2023-05-01 DIAGNOSIS — Z12.31 ENCOUNTER FOR SCREENING MAMMOGRAM FOR MALIGNANT NEOPLASM OF BREAST: ICD-10-CM

## 2023-05-01 DIAGNOSIS — F33.1 MODERATE EPISODE OF RECURRENT MAJOR DEPRESSIVE DISORDER (HCC): ICD-10-CM

## 2023-05-01 PROBLEM — G25.0 ESSENTIAL TREMOR: Status: ACTIVE | Noted: 2023-05-01

## 2023-05-01 RX ORDER — DOXYCYCLINE HYCLATE 100 MG
100 TABLET ORAL 2 TIMES DAILY
COMMUNITY
Start: 2023-04-28

## 2023-05-01 RX ORDER — CETIRIZINE HYDROCHLORIDE 10 MG/1
10 TABLET ORAL
COMMUNITY
Start: 2023-03-02

## 2023-05-01 RX ORDER — NORETHINDRONE 0.35 MG/1
TABLET ORAL
COMMUNITY
Start: 2023-02-17

## 2023-05-01 RX ORDER — METHYLPREDNISOLONE 4 MG/1
TABLET ORAL
COMMUNITY
Start: 2023-03-02

## 2023-05-01 RX ORDER — GUAIFENESIN AND PSEUDOEPHEDRINE HYDROCHLORIDE 1200; 120 MG/1; MG/1
TABLET, EXTENDED RELEASE ORAL
COMMUNITY
Start: 2023-03-02

## 2023-05-01 NOTE — PROGRESS NOTES
Chief Complaint   Patient presents with    Follow-up     F/U and check vein that pooped out in groin area        HPI   Here for follow-up of anxiety, situational stress, alcohol use, and depression  And ADHD  Was started on Adderall and the dose was titrated because she was crashing in the afternoon  She requested to switch to Vyvanse which she feels works better  Overall, improvement in her mood and motivation  Able to follow through on task better  Would like to increase the dose  Currently at 27  Does have some dry mouth which was worse with Adderall  Recently seen by ENT  On steroid and doxycycline for possible sinusitis and has a CAT scan ordered  Gets a varicose vein in the groin which at times is painful  Interested in sclerotherapy  Past Medical History:   Diagnosis Date    Anxiety     Attention deficit hyperactivity disorder (ADHD), predominantly inattentive type 7/31/2015    Cystic fibrosis carrier 5/24/2021    Formatting of this note might be different from the original  Partner declined Testing    Essential tremor 5/1/2023    IBS (irritable bowel syndrome)     Panic attacks 10/1/2021    PTSD (post-traumatic stress disorder) 10/17/2022    Raynaud's disease     [facility]    Restless leg syndrome 11/5/2018    UTI (urinary tract infection)     UTI group B Strep x 2 5 months ago        Past Surgical History:   Procedure Laterality Date    AUGMENTATION BREAST  2013    AUGMENTATION MAMMAPLASTY      2 capsular contractures on left corrected, most recent 2015   SHOULDER SURGERY Right     Two surgeries to repair recurrent dislocation    WISDOM TOOTH EXTRACTION         Social History     Tobacco Use    Smoking status: Never    Smokeless tobacco: Never   Substance Use Topics    Alcohol use: Not Currently       Social History     Social History Narrative     in [address] to [place]  Together since 2013  "  after a rough couple of years  He overdosed and    Dating Sherman Jernigan since   Daughter, Poncho Briceño as of   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   Enjoys hiking, yoga  The following portions of the patient's history were reviewed and updated as appropriate: allergies, current medications, past family history, past medical history, past social history, past surgical history and problem list       Review of Systems       /70 (BP Location: Left arm, Patient Position: Sitting, Cuff Size: Standard)   Pulse 64   Temp (!) 97 3 °F (36 3 °C) (Temporal)   Ht 5' 7\" (1 702 m)   Wt 62 kg (136 lb 9 6 oz)   LMP 2022   SpO2 93%   BMI 21 39 kg/m²      Physical Exam   Mood is okay  No swelling or tenderness noted of right leg or groin                Current Outpatient Medications:     albuterol (PROVENTIL HFA,VENTOLIN HFA) 90 mcg/act inhaler, , Disp: , Rfl:     Calcium Carb-Cholecalciferol 1000-800 MG-UNIT TABS, daily , Disp: , Rfl:     cetirizine (ZyrTEC) 10 mg tablet, Take 10 mg by mouth daily at bedtime, Disp: , Rfl:     clonazePAM (KlonoPIN) 0 5 mg tablet, Take 1 tablet (0 5 mg total) by mouth 2 (two) times a day as needed for anxiety, Disp: 60 tablet, Rfl: 1    doxycycline hyclate (VIBRA-TABS) 100 mg tablet, Take 100 mg by mouth 2 (two) times a day, Disp: , Rfl:     Clary 0 35 MG tablet, , Disp: , Rfl:     lisdexamfetamine (Vyvanse) 40 MG capsule, Take 1 capsule (40 mg total) by mouth every morning Max Daily Amount: 40 mg, Disp: 30 capsule, Rfl: 0    methylPREDNISolone 4 MG tablet therapy pack, use as directed for 6 days, Disp: , Rfl:     Mucinex D Max Strength 120-1200 MG TB12, TAKE 1 TABLET BY MOUTH 2 TIMES A DAY FOR 10 DAYS , Disp: , Rfl:     Multiple Vitamins-Minerals (MULTIVITAMIN ADULT PO), Take by mouth daily, Disp: , Rfl:     mupirocin (BACTROBAN) 2 % ointment, , Disp: , Rfl: "   Omega-3 Fatty Acids (FISH OIL) 1,000 mg, Take 1,000 mg by mouth daily , Disp: , Rfl:     propranolol (INDERAL) 20 mg tablet, , Disp: , Rfl:     Turmeric 500 MG TABS, Take by mouth, Disp: , Rfl:      No problem-specific Assessment & Plan notes found for this encounter  Diagnoses and all orders for this visit:    Attention deficit hyperactivity disorder (ADHD), predominantly inattentive type  -     lisdexamfetamine (Vyvanse) 40 MG capsule; Take 1 capsule (40 mg total) by mouth every morning Max Daily Amount: 40 mg    Moderate episode of recurrent major depressive disorder (HCC)    Varicose veins of right lower extremity with pain  -     Ambulatory Referral to Vascular Surgery; Future    Encounter for screening mammogram for malignant neoplasm of breast  -     Mammo screening bilateral w 3d & cad; Future    Other orders  -     doxycycline hyclate (VIBRA-TABS) 100 mg tablet; Take 100 mg by mouth 2 (two) times a day  -     methylPREDNISolone 4 MG tablet therapy pack; use as directed for 6 days  -     cetirizine (ZyrTEC) 10 mg tablet; Take 10 mg by mouth daily at bedtime  -     mupirocin (BACTROBAN) 2 % ointment  -     Clary 0 35 MG tablet  -     Mucinex D Max Strength 120-1200 MG TB12; TAKE 1 TABLET BY MOUTH 2 TIMES A DAY FOR 10 DAYS  Patient Instructions   ADHD symptoms are improved  Increase Vyvanse to 40 mg daily  Mood is okay with Klonopin as needed  Referral to vascular surgery for possible sclerotherapy of symptomatic varicose vein  Prescription given for mammogram   Propranolol 20 mg is helping her benign tremor  Recheck in 3 months  May call for refills in between

## 2023-05-01 NOTE — PATIENT INSTRUCTIONS
ADHD symptoms are improved  Increase Vyvanse to 40 mg daily  Mood is okay with Klonopin as needed  Referral to vascular surgery for possible sclerotherapy of symptomatic varicose vein  Prescription given for mammogram   Propranolol 20 mg is helping her benign tremor  Recheck in 3 months  May call for refills in between

## 2023-05-15 DIAGNOSIS — F41.9 ANXIETY: ICD-10-CM

## 2023-05-15 RX ORDER — CLONAZEPAM 0.5 MG/1
0.5 TABLET ORAL 2 TIMES DAILY PRN
Qty: 60 TABLET | Refills: 1 | Status: SHIPPED | OUTPATIENT
Start: 2023-05-15

## 2023-05-15 RX ORDER — PROPRANOLOL HYDROCHLORIDE 20 MG/1
20 TABLET ORAL DAILY
Qty: 90 TABLET | Refills: 3 | Status: SHIPPED | OUTPATIENT
Start: 2023-05-15

## 2023-05-15 NOTE — TELEPHONE ENCOUNTER
"Amy called to refill propanolol and clonazepam  She also stated that the Vyvanse is working well during the day but \"drops off in the afternoon\" and was wondering if she could have a refill of the immediate release Adderall 5mg that she was taking in the afternoon  Please advise     "

## 2023-05-17 ENCOUNTER — TELEPHONE (OUTPATIENT)
Dept: FAMILY MEDICINE CLINIC | Facility: CLINIC | Age: 43
End: 2023-05-17

## 2023-05-17 DIAGNOSIS — F90.0 ATTENTION DEFICIT HYPERACTIVITY DISORDER (ADHD), PREDOMINANTLY INATTENTIVE TYPE: Primary | ICD-10-CM

## 2023-05-17 NOTE — TELEPHONE ENCOUNTER
"Dr Wilder Pollock, I sent this message in the refill request box so I am not sure if you were able to see it: Amy called to refill propanolol and clonazepam  She also stated that the Vyvanse is working well during the day but \"drops off in the afternoon\" and was wondering if she could have a refill of the immediate release Adderall 5mg that she was taking in the afternoon  Please advise  Also, updated her preferred pharmacy as requested     "

## 2023-05-19 RX ORDER — DEXTROAMPHETAMINE SACCHARATE, AMPHETAMINE ASPARTATE, DEXTROAMPHETAMINE SULFATE AND AMPHETAMINE SULFATE 2.5; 2.5; 2.5; 2.5 MG/1; MG/1; MG/1; MG/1
TABLET ORAL
Qty: 15 TABLET | Refills: 0 | Status: SHIPPED | OUTPATIENT
Start: 2023-05-19

## 2023-05-19 NOTE — TELEPHONE ENCOUNTER
Advise that 5 mg Adderall in short supply  Prescription given for 10 mg to use 1/2 tablet in the afternoon

## 2023-05-19 NOTE — TELEPHONE ENCOUNTER
Called stating Vyvanse is working but still drops off in the afternoon  Given 10 mg Adderall to use 5 mg in the afternoon

## 2023-05-26 DIAGNOSIS — F90.0 ATTENTION DEFICIT HYPERACTIVITY DISORDER (ADHD), PREDOMINANTLY INATTENTIVE TYPE: ICD-10-CM

## 2023-06-15 DIAGNOSIS — F90.0 ATTENTION DEFICIT HYPERACTIVITY DISORDER (ADHD), PREDOMINANTLY INATTENTIVE TYPE: ICD-10-CM

## 2023-06-15 RX ORDER — DEXTROAMPHETAMINE SACCHARATE, AMPHETAMINE ASPARTATE, DEXTROAMPHETAMINE SULFATE AND AMPHETAMINE SULFATE 2.5; 2.5; 2.5; 2.5 MG/1; MG/1; MG/1; MG/1
TABLET ORAL
Qty: 15 TABLET | Refills: 0 | Status: SHIPPED | OUTPATIENT
Start: 2023-06-15

## 2023-06-28 ENCOUNTER — TELEPHONE (OUTPATIENT)
Dept: VASCULAR SURGERY | Facility: CLINIC | Age: 43
End: 2023-06-28

## 2023-07-10 DIAGNOSIS — F41.9 ANXIETY: ICD-10-CM

## 2023-07-10 DIAGNOSIS — F90.0 ATTENTION DEFICIT HYPERACTIVITY DISORDER (ADHD), PREDOMINANTLY INATTENTIVE TYPE: ICD-10-CM

## 2023-07-11 RX ORDER — CLONAZEPAM 0.5 MG/1
0.5 TABLET ORAL 2 TIMES DAILY PRN
Qty: 60 TABLET | Refills: 1 | Status: SHIPPED | OUTPATIENT
Start: 2023-07-11

## 2023-07-11 RX ORDER — DEXTROAMPHETAMINE SACCHARATE, AMPHETAMINE ASPARTATE, DEXTROAMPHETAMINE SULFATE AND AMPHETAMINE SULFATE 2.5; 2.5; 2.5; 2.5 MG/1; MG/1; MG/1; MG/1
TABLET ORAL
Qty: 15 TABLET | Refills: 0 | Status: SHIPPED | OUTPATIENT
Start: 2023-07-11 | End: 2023-07-21 | Stop reason: SDUPTHER

## 2023-07-21 DIAGNOSIS — F90.0 ATTENTION DEFICIT HYPERACTIVITY DISORDER (ADHD), PREDOMINANTLY INATTENTIVE TYPE: ICD-10-CM

## 2023-07-21 RX ORDER — DEXTROAMPHETAMINE SACCHARATE, AMPHETAMINE ASPARTATE, DEXTROAMPHETAMINE SULFATE AND AMPHETAMINE SULFATE 2.5; 2.5; 2.5; 2.5 MG/1; MG/1; MG/1; MG/1
TABLET ORAL
Qty: 15 TABLET | Refills: 0 | Status: SHIPPED | OUTPATIENT
Start: 2023-07-21

## 2023-07-25 DIAGNOSIS — F90.0 ATTENTION DEFICIT HYPERACTIVITY DISORDER (ADHD), PREDOMINANTLY INATTENTIVE TYPE: ICD-10-CM

## 2023-07-31 ENCOUNTER — RA CDI HCC (OUTPATIENT)
Dept: OTHER | Facility: HOSPITAL | Age: 43
End: 2023-07-31

## 2023-07-31 NOTE — PROGRESS NOTES
[address] [facility] coding opportunities       Chart reviewed, no opportunity found: CHART REVIEWED, NO OPPORTUNITY FOUND        Patients Insurance        Commercial Insurance: Dick Rizo

## 2023-08-04 ENCOUNTER — CONSULT (OUTPATIENT)
Dept: VASCULAR SURGERY | Facility: CLINIC | Age: 43
End: 2023-08-04
Payer: COMMERCIAL

## 2023-08-04 VITALS
HEIGHT: 67 IN | HEART RATE: 86 BPM | SYSTOLIC BLOOD PRESSURE: 118 MMHG | DIASTOLIC BLOOD PRESSURE: 76 MMHG | OXYGEN SATURATION: 99 % | WEIGHT: 129 LBS | BODY MASS INDEX: 20.25 KG/M2

## 2023-08-04 DIAGNOSIS — I83.811 VARICOSE VEINS OF RIGHT LOWER EXTREMITY WITH PAIN: Primary | ICD-10-CM

## 2023-08-04 PROCEDURE — 99244 OFF/OP CNSLTJ NEW/EST MOD 40: CPT | Performed by: NURSE PRACTITIONER

## 2023-08-04 RX ORDER — CLOBETASOL PROPIONATE 0.5 MG/G
OINTMENT TOPICAL
COMMUNITY
Start: 2023-06-23 | End: 2023-08-07 | Stop reason: SDUPTHER

## 2023-08-04 RX ORDER — HYDROCODONE BITARTRATE AND ACETAMINOPHEN 7.5; 325 MG/1; MG/1
1 TABLET ORAL
COMMUNITY
Start: 2023-06-01 | End: 2023-08-07 | Stop reason: ALTCHOICE

## 2023-08-04 RX ORDER — FLUTICASONE PROPIONATE 50 MCG
2 SPRAY, SUSPENSION (ML) NASAL DAILY
COMMUNITY
Start: 2023-05-22

## 2023-08-04 NOTE — PROGRESS NOTES
Assessment/Plan:    Varicose veins of right lower extremity with pain  60-year-old female with presents with symptomatic Right leg varicose veins for 6 months.    -R leg noted to have scant varicosities with spider telangiectasia of the posterior lower legs with swelling and discomfort. Main area of concern at at the upper medial groin/ thigh area. Pt states she has swelling to the area and is tender to the touch.  -Denies use of compression in the past.  -No hx of vein surgeries. No hx of DVT. -Reviewed pathophysiology of venous insuffiencey and varicose veins and treatment with conservative measures at this time with leg compression, elevation and exercise. Pt verbalized understanding and is agreeable to this plan. Discussed possibility of of pelvic congestion syndrome and possible referral to IR if treatment options are available. Will continue with conservative treatment for now. Recommendations   -Start wearing compression. RX given today with education on how to use. -Leg elevation. Goal of 3-4 times a day 15 minutes at a time.  -Increase exercise and ambulation. Goal of 3-4 times a week for 30 min. -Apply moisturizing cream to the b/l legs to maintain skin integrity and prevent breakdown.  -Call the office with any new or worsening symptoms. Diagnoses and all orders for this visit:    Varicose veins of right lower extremity with pain  -     Ambulatory Referral to Vascular Surgery  -     Compression Stocking  -     Compression Stocking    Other orders  -     clobetasol (TEMOVATE) 0.05 % ointment; APPLY TOPICALLY 2 (TWO) TIMES A DAY. APPLY TO AFFECTED AREA AS NEEDED  -     fluticasone (FLONASE) 50 mcg/act nasal spray; 2 sprays into each nostril daily  -     HYDROcodone-acetaminophen (NORCO) 7.5-325 mg per tablet; Take 1 tablet by mouth every 4 to 6 hours as needed for pain          Subjective:      Patient ID: Solo Tesfaye is a 40 y.o. female.     Pt presents new to office referred by Dr. Aldo Barraza Delaney Favre (PCP) for VV. Pt c/o swelling, heaviness, discoloration bulging veins. Pt does not wear compression stockings. Pt is not currently taking any blood thinners or cholesterol medications. Pt has no history of smoking. 36-year-old female with PMHx ADHD, depression, raynaud's, RLS is a new referral to our office for varicose veins with pain. Reports that she has had varicose veins R>L since 2017. Recently notes pain with intercourse and budging vein in the right lower groin area, this has occurred twice. Describes pain as a dull ache with palpation that has increased swelling with standing. Last pregnancy was 2017. Reports that she is done having children. Denies any leg swelling, mild swelling with heels or long periods of standing. Denies use of leg compression. Denies use of leg elevation. The following portions of the patient's history were reviewed and updated as appropriate: allergies, current medications, past family history, past medical history, past social history, past surgical history and problem list.    Review of Systems   Respiratory: Negative for shortness of breath. Cardiovascular: Positive for leg swelling. Negative for chest pain. Skin: Positive for color change. Objective:      /76 (BP Location: Left arm, Patient Position: Sitting, Cuff Size: Standard)   Pulse 86   Ht 5' 7" (1.702 m)   Wt 58.5 kg (129 lb)   LMP 09/08/2022   SpO2 99%   BMI 20.20 kg/m²          Physical Exam  Vitals and nursing note reviewed. Constitutional:       Appearance: Normal appearance. HENT:      Head: Normocephalic and atraumatic. Cardiovascular:      Rate and Rhythm: Normal rate and regular rhythm. Pulses: Normal pulses. Radial pulses are 2+ on the right side and 2+ on the left side. Dorsalis pedis pulses are 2+ on the right side and 2+ on the left side. Posterior tibial pulses are 2+ on the right side and 2+ on the left side. Pulmonary:      Effort: Pulmonary effort is normal.      Breath sounds: Normal breath sounds. Musculoskeletal:      Right lower leg: No edema. Left lower leg: No edema. Skin:     General: Skin is warm and dry. Capillary Refill: Capillary refill takes less than 2 seconds. Neurological:      General: No focal deficit present. Mental Status: She is alert and oriented to person, place, and time. Psychiatric:         Mood and Affect: Mood normal.         Behavior: Behavior normal.           I have reviewed and made appropriate changes to the review of systems input by the medical assistant. Vitals:    08/04/23 1034   BP: 118/76   BP Location: Left arm   Patient Position: Sitting   Cuff Size: Standard   Pulse: 86   SpO2: 99%   Weight: 58.5 kg (129 lb)   Height: 5' 7" (1.702 m)       Patient Active Problem List   Diagnosis   • Cervical myofascial pain syndrome   • Attention deficit hyperactivity disorder (ADHD), predominantly inattentive type   • Moderate episode of recurrent major depressive disorder (HCC)   • Restless leg syndrome   • Raynaud's phenomenon without gangrene   • Chronic constipation   • Nasal congestion   • Anxiety   • Panic attacks   • Substance use   • PTSD (post-traumatic stress disorder)   • Essential tremor   • Varicose veins of right lower extremity with pain       Past Surgical History:   Procedure Laterality Date   • AUGMENTATION BREAST  2013   • AUGMENTATION MAMMAPLASTY      2 capsular contractures on left corrected, most recent 2015.    • SHOULDER SURGERY Right     Two surgeries to repair recurrent dislocation   • WISDOM TOOTH EXTRACTION         Family History   Problem Relation Age of Onset   • Cirrhosis Father    • No Known Problems Mother    • Diabetes Father    • No Known Problems Sister    • No Known Problems Daughter    • No Known Problems Maternal Grandmother    • No Known Problems Paternal Grandmother    • No Known Problems Maternal Aunt    • Breast cancer Neg Hx Social History     Socioeconomic History   • Marital status: /Civil Union     Spouse name: Not on file   • Number of children: Not on file   • Years of education: Not on file   • Highest education level: Not on file   Occupational History   • Not on file   Tobacco Use   • Smoking status: Never   • Smokeless tobacco: Never   Vaping Use   • Vaping Use: Never used   Substance and Sexual Activity   • Alcohol use: Not Currently   • Drug use: Not Currently   • Sexual activity: Yes     Partners: Male   Other Topics Concern   • Not on file   Social History Narrative     in [address] to [place]. Together since .   after a rough couple of years. He overdosed and  . Dating Fiez since . Daughter, Maggie King, Poncho as of . 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 Enjoys hiking, yoga.      Social Determinants of Health     Financial Resource Strain: Not on file   Food Insecurity: Not on file   Transportation Needs: Not on file   Physical Activity: Not on file   Stress: Not on file   Social Connections: Not on file   Intimate Partner Violence: Not on file   Housing Stability: Not on file       Allergies   Allergen Reactions   • Cephalexin Nausea Only      With abdominal pain   • Penicillins Other (See Comments)     unknown   • Vancomycin Hives     hives   • Penicillins Rash   • Wheat Bran - Food Allergy GI Intolerance and Rash         Current Outpatient Medications:   •  albuterol (PROVENTIL HFA,VENTOLIN HFA) 90 mcg/act inhaler, , Disp: , Rfl:   •  amphetamine-dextroamphetamine (ADDERALL, 10MG,) 10 mg tablet, 1/2 tablet in the afternoon, Disp: 15 tablet, Rfl: 0  •  Calcium Carb-Cholecalciferol 1000-800 MG-UNIT TABS, daily , Disp: , Rfl:   •  cetirizine (ZyrTEC) 10 mg tablet, Take 10 mg by mouth daily at bedtime, Disp: , Rfl:   •  clobetasol (TEMOVATE) 0.05 % ointment, APPLY TOPICALLY 2 (TWO) TIMES A DAY. APPLY TO AFFECTED AREA AS NEEDED, Disp: , Rfl:   •  clonazePAM (KlonoPIN) 0.5 mg tablet, Take 1 tablet (0.5 mg total) by mouth 2 (two) times a day as needed for anxiety, Disp: 60 tablet, Rfl: 1  •  doxycycline hyclate (VIBRA-TABS) 100 mg tablet, Take 100 mg by mouth 2 (two) times a day, Disp: , Rfl:   •  Clary 0.35 MG tablet, , Disp: , Rfl:   •  fluticasone (FLONASE) 50 mcg/act nasal spray, 2 sprays into each nostril daily, Disp: , Rfl:   •  HYDROcodone-acetaminophen (NORCO) 7.5-325 mg per tablet, Take 1 tablet by mouth every 4 to 6 hours as needed for pain, Disp: , Rfl:   •  lisdexamfetamine (Vyvanse) 40 MG capsule, Take 1 capsule (40 mg total) by mouth every morning Max Daily Amount: 40 mg, Disp: 30 capsule, Rfl: 0  •  Mucinex D Max Strength 120-1200 MG TB12, TAKE 1 TABLET BY MOUTH 2 TIMES A DAY FOR 10 DAYS., Disp: , Rfl:   •  Multiple Vitamins-Minerals (MULTIVITAMIN ADULT PO), Take by mouth daily, Disp: , Rfl:   •  mupirocin (BACTROBAN) 2 % ointment, , Disp: , Rfl:   •  Omega-3 Fatty Acids (FISH OIL) 1,000 mg, Take 1,000 mg by mouth daily , Disp: , Rfl:   •  propranolol (INDERAL) 20 mg tablet, Take 1 tablet (20 mg total) by mouth in the morning, Disp: 90 tablet, Rfl: 3  •  Turmeric 500 MG TABS, Take by mouth, Disp: , Rfl:   •  methylPREDNISolone 4 MG tablet therapy pack, use as directed for 6 days (Patient not taking: Reported on 8/4/2023), Disp: , Rfl:   I have spent a total time of 30 minutes on 08/04/23 in caring for this patient including Diagnostic results, Risks and benefits of tx options, Instructions for management, Patient and family education, Importance of tx compliance, Risk factor reductions, Documenting in the medical record, Reviewing / ordering tests, medicine, procedures   and Obtaining or reviewing history  .

## 2023-08-04 NOTE — PATIENT INSTRUCTIONS
- Call the office if you experience any changes to your legs or feet such as new pain, redness or swelling.    - Stay active. Exercise everyday. Walking is the recommended exercise with a goal of 30 min 3-4 times a week. A healthy weight can assist in decreasing varicose vein symptoms.     - Wear Compression. Put them on in the morning, wear all day and take off before bed at night.     -Elevate your legs above the level of your heart. Elevate for 15 minutes 3-4 times a day. -When looking at buying compression, look for "gradient compression" with a weight 20-30mmHg (medium weight), thigh high is fine.  -Try "[employer] Natural.Leapforce"    -A good brand is Sigvaris, soft opaque, knee high.

## 2023-08-04 NOTE — ASSESSMENT & PLAN NOTE
41-year-old female with presents with symptomatic Right leg varicose veins for 6 months.    -R leg noted to have scant varicosities with spider telangiectasia of the posterior lower legs with swelling and discomfort. Main area of concern at at the upper medial groin/ thigh area. Pt states she has swelling to the area and is tender to the touch.  -Denies use of compression in the past.  -No hx of vein surgeries. No hx of DVT. -Reviewed pathophysiology of venous insuffiencey and varicose veins and treatment with conservative measures at this time with leg compression, elevation and exercise. Pt verbalized understanding and is agreeable to this plan. Discussed possibility of of pelvic congestion syndrome and possible referral to IR if treatment options are available. Will continue with conservative treatment for now. Recommendations   -Start wearing compression. RX given today with education on how to use. -Leg elevation. Goal of 3-4 times a day 15 minutes at a time.  -Increase exercise and ambulation. Goal of 3-4 times a week for 30 min. -Apply moisturizing cream to the b/l legs to maintain skin integrity and prevent breakdown.  -Call the office with any new or worsening symptoms.

## 2023-08-07 ENCOUNTER — OFFICE VISIT (OUTPATIENT)
Dept: FAMILY MEDICINE CLINIC | Facility: CLINIC | Age: 43
End: 2023-08-07
Payer: COMMERCIAL

## 2023-08-07 VITALS
DIASTOLIC BLOOD PRESSURE: 60 MMHG | TEMPERATURE: 97.7 F | SYSTOLIC BLOOD PRESSURE: 92 MMHG | WEIGHT: 127 LBS | OXYGEN SATURATION: 98 % | HEART RATE: 67 BPM | BODY MASS INDEX: 19.93 KG/M2 | HEIGHT: 67 IN

## 2023-08-07 DIAGNOSIS — F33.0 MILD EPISODE OF RECURRENT MAJOR DEPRESSIVE DISORDER (HCC): ICD-10-CM

## 2023-08-07 DIAGNOSIS — S03.00XS DISLOCATION OF TEMPOROMANDIBULAR JOINT, SEQUELA: ICD-10-CM

## 2023-08-07 DIAGNOSIS — R63.4 WEIGHT LOSS: ICD-10-CM

## 2023-08-07 DIAGNOSIS — F45.8 TEETH GRINDING: ICD-10-CM

## 2023-08-07 DIAGNOSIS — R21 RASH: ICD-10-CM

## 2023-08-07 DIAGNOSIS — F90.0 ATTENTION DEFICIT HYPERACTIVITY DISORDER (ADHD), PREDOMINANTLY INATTENTIVE TYPE: Primary | ICD-10-CM

## 2023-08-07 DIAGNOSIS — F33.2 SEVERE EPISODE OF RECURRENT MAJOR DEPRESSIVE DISORDER, WITHOUT PSYCHOTIC FEATURES (HCC): ICD-10-CM

## 2023-08-07 PROCEDURE — 99214 OFFICE O/P EST MOD 30 MIN: CPT | Performed by: FAMILY MEDICINE

## 2023-08-07 RX ORDER — BUPROPION HYDROCHLORIDE 100 MG/1
100 TABLET, EXTENDED RELEASE ORAL DAILY
Qty: 30 TABLET | Refills: 5 | Status: SHIPPED | OUTPATIENT
Start: 2023-08-07

## 2023-08-07 RX ORDER — DOXYCYCLINE HYCLATE 100 MG
100 TABLET ORAL 2 TIMES DAILY
Status: CANCELLED | OUTPATIENT
Start: 2023-08-07

## 2023-08-07 RX ORDER — CLOBETASOL PROPIONATE 0.5 MG/G
OINTMENT TOPICAL 2 TIMES DAILY
Qty: 30 G | Refills: 2 | Status: SHIPPED | OUTPATIENT
Start: 2023-08-07

## 2023-08-07 NOTE — PROGRESS NOTES
Chief Complaint   Patient presents with   • Physical Exam   • Follow-up     Mouth concerns        HPI   Here with mouth concerns. On and off for the last 2 or 3 years. Discomfort on the right side. Gets periorbital edema, fullness in her ear, drainage from her ear, has been seen by multiple dentists who feel that its not dental although one tooth was removed. Usually gets better with an antibiotic. Had a CAT scan of her neck and said to have a peritonsillar abscess. Had another CAT scan and it was smaller. Sometimes she can express some pus. Has been seen by ENT. Being followed for it. ENT note from 6/9 reviewed. No disease was noted. Has had 5 teeth removed. Grinds her teeth at night. 20 pound weight loss in the last 10 months although 10 pounds in the last 3 months. Broke up with boyfriend. Had to move to a new apartment. Struggling with what ever is going on in her mouth. Maintained on 40 mg of Vyvanse and uses one half of 10 mg in the afternoon. Inquires about antidepressants because of ruminating thoughts about the break-up. Feeling depressed. Was on Lexapro and Prozac which did not help her sex drive. Inquires about Wellbutrin.     Past Medical History:   Diagnosis Date   • Anxiety    • Attention deficit hyperactivity disorder (ADHD), predominantly inattentive type 7/31/2015   • Cystic fibrosis carrier 5/24/2021    Formatting of this note might be different from the original. Partner declined Testing   • Essential tremor 5/1/2023   • IBS (irritable bowel syndrome)    • Panic attacks 10/1/2021   • PTSD (post-traumatic stress disorder) 10/17/2022   • Raynaud's disease    • Recurrent major depressive disorder, in full remission ([address]) 11/5/2018   • Restless leg syndrome 11/5/2018   • UTI (urinary tract infection)     UTI group B Strep x 2.5 months ago        Past Surgical History:   Procedure Laterality Date   • AUGMENTATION BREAST  2013   • AUGMENTATION MAMMAPLASTY      2 capsular contractures on left corrected, most recent . • SHOULDER SURGERY Right     Two surgeries to repair recurrent dislocation   • WISDOM TOOTH EXTRACTION         Social History     Tobacco Use   • Smoking status: Never   • Smokeless tobacco: Never   Substance Use Topics   • Alcohol use: Not Currently       Social History     Social History Narrative    Was  in [place].   after a rough couple of years. He overdosed and  . Dating [place] Pointer since . Broke up . Daughter, Jonnathan Kohli, [place] as of . Has an apartment in [facility]. 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 Enjoys hiking, yoga. The following portions of the patient's history were reviewed and updated as appropriate: allergies, current medications, past family history, past medical history, past social history, past surgical history and problem list.      Review of Systems       BP 92/60 (BP Location: Left arm, Patient Position: Sitting, Cuff Size: Adult)   Pulse 67   Temp 97.7 °F (36.5 °C) (Temporal)   Ht 5' 7" (1.702 m)   Wt 57.6 kg (127 lb)   LMP 2023   SpO2 98%   Breastfeeding No   BMI 19.89 kg/m²      Physical Exam   Oral exam is unremarkable. No signs of infection or pustular drainage. Teeth appear to be well-maintained.               Current Outpatient Medications:   •  albuterol (PROVENTIL HFA,VENTOLIN HFA) 90 mcg/act inhaler, , Disp: , Rfl:   •  amphetamine-dextroamphetamine (ADDERALL, 10MG,) 10 mg tablet, 1/2 tablet in the afternoon, Disp: 15 tablet, Rfl: 0  •  buPROPion (Wellbutrin SR) 100 mg 12 hr tablet, Take 1 tablet (100 mg total) by mouth in the morning, Disp: 30 tablet, Rfl: 5  •  Calcium Carb-Cholecalciferol 1000-800 MG-UNIT TABS, daily , Disp: , Rfl:   •  cetirizine (ZyrTEC) 10 mg tablet, Take 10 mg by mouth daily at bedtime, Disp: , Rfl:   •  clobetasol (TEMOVATE) 0.05 % ointment, Apply topically 2 (two) times a day, Disp: 30 g, Rfl: 2  •  clonazePAM (KlonoPIN) 0.5 mg tablet, Take 1 tablet (0.5 mg total) by mouth 2 (two) times a day as needed for anxiety, Disp: 60 tablet, Rfl: 1  •  doxycycline hyclate (VIBRA-TABS) 100 mg tablet, Take 100 mg by mouth 2 (two) times a day, Disp: , Rfl:   •  Clary 0.35 MG tablet, , Disp: , Rfl:   •  fluticasone (FLONASE) 50 mcg/act nasal spray, 2 sprays into each nostril daily, Disp: , Rfl:   •  lisdexamfetamine (Vyvanse) 40 MG capsule, Take 1 capsule (40 mg total) by mouth every morning Max Daily Amount: 40 mg, Disp: 30 capsule, Rfl: 0  •  Mucinex D Max Strength 120-1200 MG TB12, TAKE 1 TABLET BY MOUTH 2 TIMES A DAY FOR 10 DAYS., Disp: , Rfl:   •  Multiple Vitamins-Minerals (MULTIVITAMIN ADULT PO), Take by mouth daily, Disp: , Rfl:   •  mupirocin (BACTROBAN) 2 % ointment, Apply topically 3 (three) times a day, Disp: 22 g, Rfl: 5  •  Omega-3 Fatty Acids (FISH OIL) 1,000 mg, Take 1,000 mg by mouth daily , Disp: , Rfl:   •  propranolol (INDERAL) 20 mg tablet, Take 1 tablet (20 mg total) by mouth in the morning, Disp: 90 tablet, Rfl: 3  •  Turmeric 500 MG TABS, Take by mouth, Disp: , Rfl:      No problem-specific Assessment & Plan notes found for this encounter. Diagnoses and all orders for this visit:    Attention deficit hyperactivity disorder (ADHD), predominantly inattentive type    Weight loss    Dislocation of temporomandibular joint, sequela    Rash  -     clobetasol (TEMOVATE) 0.05 % ointment; Apply topically 2 (two) times a day  -     mupirocin (BACTROBAN) 2 % ointment; Apply topically 3 (three) times a day    Mild episode of recurrent major depressive disorder (HCC)  -     buPROPion (Wellbutrin SR) 100 mg 12 hr tablet;  Take 1 tablet (100 mg total) by mouth in the morning    Severe episode of recurrent major depressive disorder, without psychotic features ([address])    Teeth grinding        Patient Instructions   Discomfort could be secondary to her temporomandibular joint. No signs of infection today. Discussion of weight loss which is probably from stress. Continues on Vyvanse and amphetamine for ADHD. Which is working for her. Begin bupropion 100 mg once daily in the morning. Prescription given for clobetasol to use for irritation and mupirocin to use for infection. Recheck in 1 month.

## 2023-08-07 NOTE — PATIENT INSTRUCTIONS
Discomfort could be secondary to her temporomandibular joint. No signs of infection today. Discussion of weight loss which is probably from stress. Continues on Vyvanse and amphetamine for ADHD. Which is working for her. Begin bupropion 100 mg once daily in the morning. Prescription given for clobetasol to use for irritation and mupirocin to use for infection. Recheck in 1 month.

## 2023-08-15 DIAGNOSIS — F41.9 ANXIETY: ICD-10-CM

## 2023-08-15 RX ORDER — CLONAZEPAM 0.5 MG/1
0.5 TABLET ORAL 2 TIMES DAILY PRN
Qty: 60 TABLET | Refills: 1 | Status: SHIPPED | OUTPATIENT
Start: 2023-08-15

## 2023-08-22 DIAGNOSIS — F90.0 ATTENTION DEFICIT HYPERACTIVITY DISORDER (ADHD), PREDOMINANTLY INATTENTIVE TYPE: ICD-10-CM

## 2023-08-23 RX ORDER — DEXTROAMPHETAMINE SACCHARATE, AMPHETAMINE ASPARTATE, DEXTROAMPHETAMINE SULFATE AND AMPHETAMINE SULFATE 2.5; 2.5; 2.5; 2.5 MG/1; MG/1; MG/1; MG/1
TABLET ORAL
Qty: 15 TABLET | Refills: 0 | Status: SHIPPED | OUTPATIENT
Start: 2023-08-23

## 2023-09-02 DIAGNOSIS — F33.0 MILD EPISODE OF RECURRENT MAJOR DEPRESSIVE DISORDER (HCC): ICD-10-CM

## 2023-09-05 RX ORDER — BUPROPION HYDROCHLORIDE 100 MG/1
100 TABLET, EXTENDED RELEASE ORAL DAILY
Qty: 90 TABLET | Refills: 2 | Status: SHIPPED | OUTPATIENT
Start: 2023-09-05

## 2023-09-15 ENCOUNTER — TELEPHONE (OUTPATIENT)
Dept: ADMINISTRATIVE | Facility: OTHER | Age: 43
End: 2023-09-15

## 2023-09-15 NOTE — TELEPHONE ENCOUNTER
----- Message from Anju Heaton sent at 9/15/2023 11:46 AM EDT -----  Regarding: care gap request  09/15/23 11:46 AM    Hello, our patient attached above has had HIV completed/performed. Please assist in updating the patient chart by pulling the Care Everywhere (CE) document. The date of service is 12/27/2016.      Thank you,  Marilee Herman

## 2023-09-15 NOTE — TELEPHONE ENCOUNTER
----- Message from Therese Angelucci, [facility] sent at 9/15/2023 11:46 AM EDT -----  Regarding: care gap request  09/15/23 11:46 AM    Hello, our patient attached above has had Hepatitis C completed/performed. Please assist in updating the patient chart by pulling the Care Everywhere (CE) document. The date of service is 12/27/2016.      Thank you,  Therese Angelucci [facility] [place]

## 2023-09-18 DIAGNOSIS — F90.0 ATTENTION DEFICIT HYPERACTIVITY DISORDER (ADHD), PREDOMINANTLY INATTENTIVE TYPE: ICD-10-CM

## 2023-09-19 RX ORDER — LISDEXAMFETAMINE DIMESYLATE CAPSULES 40 MG/1
40 CAPSULE ORAL EVERY MORNING
Qty: 30 CAPSULE | Refills: 0 | Status: SHIPPED | OUTPATIENT
Start: 2023-09-19

## 2023-09-19 RX ORDER — DEXTROAMPHETAMINE SACCHARATE, AMPHETAMINE ASPARTATE, DEXTROAMPHETAMINE SULFATE AND AMPHETAMINE SULFATE 2.5; 2.5; 2.5; 2.5 MG/1; MG/1; MG/1; MG/1
TABLET ORAL
Qty: 15 TABLET | Refills: 0 | Status: SHIPPED | OUTPATIENT
Start: 2023-09-19

## 2023-09-20 NOTE — TELEPHONE ENCOUNTER
Upon review of the In Basket request we were able to locate, review, and update the patient chart as requested for Hepatitis C  and HIV. Any additional questions or concerns should be emailed to the Practice Liaisons via the appropriate education email address, please do not reply via In Basket.     Thank you  Jonny Mackay

## 2023-10-22 ENCOUNTER — HOSPITAL ENCOUNTER (EMERGENCY)
Facility: HOSPITAL | Age: 43
Discharge: HOME | End: 2023-10-22
Attending: EMERGENCY MEDICINE | Admitting: EMERGENCY MEDICINE
Payer: COMMERCIAL

## 2023-10-22 VITALS
OXYGEN SATURATION: 99 % | HEART RATE: 83 BPM | TEMPERATURE: 98.2 F | SYSTOLIC BLOOD PRESSURE: 122 MMHG | RESPIRATION RATE: 18 BRPM | DIASTOLIC BLOOD PRESSURE: 61 MMHG

## 2023-10-22 DIAGNOSIS — R21 RASH: Primary | ICD-10-CM

## 2023-10-22 LAB
ALBUMIN SERPL BCP-MCNC: 3.9 G/DL (ref 3.5–5)
ALP SERPL-CCNC: 41 U/L (ref 34–104)
ALT SERPL W P-5'-P-CCNC: 21 U/L (ref 7–52)
ANION GAP SERPL CALCULATED.3IONS-SCNC: 5 MMOL/L
AST SERPL W P-5'-P-CCNC: 23 U/L (ref 13–39)
BASOPHILS # BLD AUTO: 0.06 THOUSANDS/ÂΜL (ref 0–0.1)
BASOPHILS NFR BLD AUTO: 1 % (ref 0–1)
BILIRUB SERPL-MCNC: 0.33 MG/DL (ref 0.2–1)
BUN SERPL-MCNC: 16 MG/DL (ref 5–25)
CALCIUM SERPL-MCNC: 8.3 MG/DL (ref 8.4–10.2)
CHLORIDE SERPL-SCNC: 104 MMOL/L (ref 96–108)
CO2 SERPL-SCNC: 29 MMOL/L (ref 21–32)
CREAT SERPL-MCNC: 0.74 MG/DL (ref 0.6–1.3)
CRP SERPL QL: <1 MG/L
EOSINOPHIL # BLD AUTO: 1.62 THOUSAND/ÂΜL (ref 0–0.61)
EOSINOPHIL NFR BLD AUTO: 15 % (ref 0–6)
ERYTHROCYTE [DISTWIDTH] IN BLOOD BY AUTOMATED COUNT: 12.3 % (ref 11.6–15.1)
ERYTHROCYTE [SEDIMENTATION RATE] IN BLOOD: <1 MM/HOUR (ref 0–19)
GFR SERPL CREATININE-BSD FRML MDRD: 100 ML/MIN/1.73SQ M
GLUCOSE SERPL-MCNC: 127 MG/DL (ref 65–140)
HCT VFR BLD AUTO: 40 % (ref 34.8–46.1)
HGB BLD-MCNC: 13.2 G/DL (ref 11.5–15.4)
IMM GRANULOCYTES # BLD AUTO: 0.03 THOUSAND/UL (ref 0–0.2)
IMM GRANULOCYTES NFR BLD AUTO: 0 % (ref 0–2)
LYMPHOCYTES # BLD AUTO: 1.55 THOUSANDS/ÂΜL (ref 0.6–4.47)
LYMPHOCYTES NFR BLD AUTO: 14 % (ref 14–44)
MCH RBC QN AUTO: 33.5 PG (ref 26.8–34.3)
MCHC RBC AUTO-ENTMCNC: 33 G/DL (ref 31.4–37.4)
MCV RBC AUTO: 102 FL (ref 82–98)
MONOCYTES # BLD AUTO: 0.56 THOUSAND/ÂΜL (ref 0.17–1.22)
MONOCYTES NFR BLD AUTO: 5 % (ref 4–12)
NEUTROPHILS # BLD AUTO: 6.93 THOUSANDS/ÂΜL (ref 1.85–7.62)
NEUTS SEG NFR BLD AUTO: 65 % (ref 43–75)
NRBC BLD AUTO-RTO: 0 /100 WBCS
PLATELET # BLD AUTO: 217 THOUSANDS/UL (ref 149–390)
PMV BLD AUTO: 10.6 FL (ref 8.9–12.7)
POTASSIUM SERPL-SCNC: 3.3 MMOL/L (ref 3.5–5.3)
PROT SERPL-MCNC: 5.7 G/DL (ref 6.4–8.4)
RBC # BLD AUTO: 3.94 MILLION/UL (ref 3.81–5.12)
SODIUM SERPL-SCNC: 138 MMOL/L (ref 135–147)
WBC # BLD AUTO: 10.75 THOUSAND/UL (ref 4.31–10.16)

## 2023-10-22 PROCEDURE — 80053 COMPREHEN METABOLIC PANEL: CPT | Performed by: EMERGENCY MEDICINE

## 2023-10-22 PROCEDURE — 86038 ANTINUCLEAR ANTIBODIES: CPT

## 2023-10-22 PROCEDURE — 99284 EMERGENCY DEPT VISIT MOD MDM: CPT | Performed by: EMERGENCY MEDICINE

## 2023-10-22 PROCEDURE — 85025 COMPLETE CBC W/AUTO DIFF WBC: CPT | Performed by: EMERGENCY MEDICINE

## 2023-10-22 PROCEDURE — 99283 EMERGENCY DEPT VISIT LOW MDM: CPT

## 2023-10-22 PROCEDURE — 96374 THER/PROPH/DIAG INJ IV PUSH: CPT

## 2023-10-22 PROCEDURE — 85652 RBC SED RATE AUTOMATED: CPT

## 2023-10-22 PROCEDURE — 86140 C-REACTIVE PROTEIN: CPT

## 2023-10-22 PROCEDURE — 36415 COLL VENOUS BLD VENIPUNCTURE: CPT

## 2023-10-22 RX ORDER — HYDROXYZINE HYDROCHLORIDE 25 MG/1
25 TABLET, FILM COATED ORAL ONCE
Status: COMPLETED | OUTPATIENT
Start: 2023-10-22 | End: 2023-10-22

## 2023-10-22 RX ORDER — PERMETHRIN 50 MG/G
CREAM TOPICAL
Qty: 60 G | Refills: 0 | Status: SHIPPED | OUTPATIENT
Start: 2023-10-22

## 2023-10-22 RX ORDER — HYDROXYZINE HYDROCHLORIDE 25 MG/1
25 TABLET, FILM COATED ORAL EVERY 6 HOURS
Qty: 12 TABLET | Refills: 0 | Status: SHIPPED | OUTPATIENT
Start: 2023-10-22

## 2023-10-22 RX ORDER — HYDROXYZINE HYDROCHLORIDE 25 MG/1
25 TABLET, FILM COATED ORAL ONCE
Status: CANCELLED | OUTPATIENT
Start: 2023-10-22 | End: 2023-10-22

## 2023-10-22 RX ORDER — ACYCLOVIR 400 MG/1
400 TABLET ORAL DAILY
COMMUNITY
Start: 2023-09-01 | End: 2024-02-28

## 2023-10-22 RX ORDER — TRIAMCINOLONE ACETONIDE 1 MG/G
OINTMENT TOPICAL 2 TIMES DAILY
Qty: 80 G | Refills: 0 | Status: SHIPPED | OUTPATIENT
Start: 2023-10-22 | End: 2023-10-24

## 2023-10-22 RX ORDER — KETOROLAC TROMETHAMINE 30 MG/ML
15 INJECTION, SOLUTION INTRAMUSCULAR; INTRAVENOUS ONCE
Status: COMPLETED | OUTPATIENT
Start: 2023-10-22 | End: 2023-10-22

## 2023-10-22 RX ADMIN — HYDROXYZINE HYDROCHLORIDE 25 MG: 25 TABLET ORAL at 23:44

## 2023-10-22 RX ADMIN — KETOROLAC TROMETHAMINE 15 MG: 30 INJECTION, SOLUTION INTRAMUSCULAR; INTRAVENOUS at 20:27

## 2023-10-22 RX ADMIN — HYDROXYZINE HYDROCHLORIDE 25 MG: 25 TABLET ORAL at 21:05

## 2023-10-22 NOTE — ED ATTENDING ATTESTATION
10/22/2023  ISung MD, saw and evaluated the patient. I have discussed the patient with the resident/non-physician practitioner and agree with the resident's/non-physician practitioner's findings, Plan of Care, and MDM as documented in the resident's/non-physician practitioner's note, except where noted. All available labs and Radiology studies were reviewed. I was present for key portions of any procedure(s) performed by the resident/non-physician practitioner and I was immediately available to provide assistance. At this point I agree with the current assessment done in the Emergency Department. I have conducted an independent evaluation of this patient a history and physical is as follows:    S:  Chief Complaint   Patient presents with    Rash     Pt presents to the ED with severe generalized rash onset 3 weeks ago. Pt reports started initially in her groin and abd. Pt reports severe itching and pain. Has been to dermatology and is currently on rx of prednisone 40mg daily and 100mg doxycycline bid. Rob Meade is a 40 y.o. female who presents with the chief complaint of a pruritic painful rash that is found in her intertriginous regions (groins, antecubital fossa, axilla). She is also having pruritus over her bilateral forearms. She reports she was having these symptoms 2 years ago but had been doing well until about 3 weeks ago. 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 She reports she was also seen by a rheumatologist and that labs were ordered but not drawn. She does mention concern for insects (has had  out to the house, no insects found). She reports a history of psych with substance abuse but states she has been clean for more than a year.       O:  ED Triage Vitals   Temperature Pulse Respirations Blood Pressure SpO2   10/22/23 1731 10/22/23 1731 10/22/23 1731 10/22/23 1731 10/22/23 1731   98.2 °F (36.8 °C) 78 18 113/74 99 %      Temp Source Heart Rate Source Patient Position - Orthostatic VS BP Location FiO2 (%)   10/22/23 1731 10/22/23 1731 10/22/23 2100 10/22/23 1731 --   Oral Monitor Lying Right arm       Pain Score       --                Physical Exam  Vitals and nursing note reviewed. Constitutional:       General: She is in acute distress. Appearance: She is well-developed. HENT:      Head: Normocephalic and atraumatic. Eyes:      Pupils: Pupils are equal, round, and reactive to light. Neck:      Vascular: No JVD. Cardiovascular:      Rate and Rhythm: Normal rate and regular rhythm. Heart sounds: Normal heart sounds. No murmur heard. No friction rub. No gallop. Pulmonary:      Effort: Pulmonary effort is normal. No respiratory distress. Breath sounds: Normal breath sounds. No wheezing or rales. Chest:      Chest wall: No tenderness. Musculoskeletal:         General: No tenderness. Normal range of motion. Cervical back: Normal range of motion. Skin:     General: Skin is warm and dry. Comments: Indurated skin in the intertriginous areas, excoriations to bilateral forearms, some erythema noted     Neurological:      General: No focal deficit present. Mental Status: She is alert and oriented to person, place, and time. Psychiatric:         Behavior: Behavior normal.         Thought Content:  Thought content normal.         Judgment: Judgment normal.       A/P:  Background: 40 y.o. female presents with rash, painful and itchy    Differential DX includes but is not limited to: psychiatric dermatology complaint (fixation on insects, poisoning), autoimmune derm disease, doubt infectious cause    Plan: basic labs, rheum labs, possible admission for obs/derm eval    ED Course     Labs Reviewed   CBC AND DIFFERENTIAL - Abnormal       Result Value Ref Range Status    WBC 10.75 (*) 4.31 - 10.16 Thousand/uL Final    RBC 3.94  3.81 - 5.12 Million/uL Final Hemoglobin 13.2  11.5 - 15.4 g/dL Final    Hematocrit 40.0  34.8 - 46.1 % Final     (*) 82 - 98 fL Final    MCH 33.5  26.8 - 34.3 pg Final    MCHC 33.0  31.4 - 37.4 g/dL Final    RDW 12.3  11.6 - 15.1 % Final    MPV 10.6  8.9 - 12.7 fL Final    Platelets 381  742 - 390 Thousands/uL Final    nRBC 0  /100 WBCs Final    Neutrophils Relative 65  43 - 75 % Final    Immat GRANS % 0  0 - 2 % Final    Lymphocytes Relative 14  14 - 44 % Final    Monocytes Relative 5  4 - 12 % Final    Eosinophils Relative 15 (*) 0 - 6 % Final    Basophils Relative 1  0 - 1 % Final    Neutrophils Absolute 6.93  1.85 - 7.62 Thousands/µL Final    Immature Grans Absolute 0.03  0.00 - 0.20 Thousand/uL Final    Lymphocytes Absolute 1.55  0.60 - 4.47 Thousands/µL Final    Monocytes Absolute 0.56  0.17 - 1.22 Thousand/µL Final    Eosinophils Absolute 1.62 (*) 0.00 - 0.61 Thousand/µL Final    Basophils Absolute 0.06  0.00 - 0.10 Thousands/µL Final   COMPREHENSIVE METABOLIC PANEL - Abnormal    Sodium 138  135 - 147 mmol/L Final    Potassium 3.3 (*) 3.5 - 5.3 mmol/L Final    Chloride 104  96 - 108 mmol/L Final    CO2 29  21 - 32 mmol/L Final    ANION GAP 5  mmol/L Final    BUN 16  5 - 25 mg/dL Final    Creatinine 0.74  0.60 - 1.30 mg/dL Final    Comment: Standardized to IDMS reference method    Glucose 127  65 - 140 mg/dL Final    Comment: If the patient is fasting, the ADA then defines impaired fasting glucose as > 100 mg/dL and diabetes as > or equal to 123 mg/dL. Calcium 8.3 (*) 8.4 - 10.2 mg/dL Final    AST 23  13 - 39 U/L Final    ALT 21  7 - 52 U/L Final    Comment: Specimen collection should occur prior to Sulfasalazine administration due to the potential for falsely depressed results.      Alkaline Phosphatase 41  34 - 104 U/L Final    Total Protein 5.7 (*) 6.4 - 8.4 g/dL Final    Albumin 3.9  3.5 - 5.0 g/dL Final    Total Bilirubin 0.33  0.20 - 1.00 mg/dL Final    Comment: Use of this assay is not recommended for patients undergoing treatment with eltrombopag due to the potential for falsely elevated results. N-acetyl-p-benzoquinone imine (metabolite of Acetaminophen) will generate erroneously low results in samples for patients that have taken an overdose of Acetaminophen. eGFR 100  ml/min/1.73sq m Final    Narrative:     [place] guidelines for Chronic Kidney Disease (CKD):     Stage 1 with normal or high GFR (GFR > 90 mL/min/1.73 square meters)    Stage 2 Mild CKD (GFR = 60-89 mL/min/1.73 square meters)    Stage 3A Moderate CKD (GFR = 45-59 mL/min/1.73 square meters)    Stage 3B Moderate CKD (GFR = 30-44 mL/min/1.73 square meters)    Stage 4 Severe CKD (GFR = 15-29 mL/min/1.73 square meters)    Stage 5 End Stage CKD (GFR <15 mL/min/1.73 square meters)  Note: GFR calculation is accurate only with a steady state creatinine   SEDIMENTATION RATE - Normal    Sed Rate <1  0 - 19 mm/hour Final    Narrative:     Verified by repeat. C-REACTIVE PROTEIN - Normal    CRP <1.0  <3.0 mg/L Final   MYRON SCREEN W/ REFLEX TO TITER/PATTERN     No orders to display     Medications   ketorolac (TORADOL) injection 15 mg (15 mg Intravenous Given 10/22/23 2027)   hydrOXYzine HCL (ATARAX) tablet 25 mg (25 mg Oral Given 10/22/23 2105)   hydrOXYzine HCL (ATARAX) tablet 25 mg (25 mg Oral Given 10/22/23 2344)         Critical Care Time  Procedures    Time reflects when diagnosis was documented in both MDM as applicable and the Disposition within this note       Time User Action Codes Description Comment    10/22/2023 11:26 PM Amado [address] Rash           ED Disposition       ED Disposition   Discharge    Condition   Stable    Date/Time   Sun Oct 22, 2023 11:26 PM    [address] [place] [address] discharge to home/self care.                    Follow-up Information       Follow up With Specialties Details Why Contact Info Additional Information    [facility] Schedule an appointment as soon as possible for a visit   [address] [place] [phone] [address] [facility] [address] [place], [place], [facility]

## 2023-10-23 LAB — ANA SER QL IA: NEGATIVE

## 2023-10-23 NOTE — DISCHARGE INSTRUCTIONS
Please follow-up with [facility] dermatology department. They will reach out to you and help schedule you an appointment tomorrow or Tuesday. Please reach out to your prior dermatologist for your skin biopsy report. Please take it to your next dermatologist appointment if available. You can apply the permethrin 5% cream to affected area once. You can apply the Kenalog 0.1% ointment to bilateral groin area for the itchiness. You can still take your Zyrtec or OTC antihistamines. Please stop using any detergent, body lotions, shampoo, perfume or other skin care products prior you are seen by our dermatologist.  Please only use vansiline for your skin care for now.

## 2023-10-24 ENCOUNTER — OFFICE VISIT (OUTPATIENT)
Dept: DERMATOLOGY | Facility: CLINIC | Age: 43
End: 2023-10-24
Payer: COMMERCIAL

## 2023-10-24 ENCOUNTER — TELEPHONE (OUTPATIENT)
Dept: DERMATOLOGY | Facility: CLINIC | Age: 43
End: 2023-10-24

## 2023-10-24 VITALS — WEIGHT: 125 LBS | TEMPERATURE: 98.3 F | HEIGHT: 67 IN | BODY MASS INDEX: 19.62 KG/M2

## 2023-10-24 DIAGNOSIS — F90.0 ATTENTION DEFICIT HYPERACTIVITY DISORDER (ADHD), PREDOMINANTLY INATTENTIVE TYPE: ICD-10-CM

## 2023-10-24 DIAGNOSIS — R21 RASH: Primary | ICD-10-CM

## 2023-10-24 DIAGNOSIS — F41.9 ANXIETY: ICD-10-CM

## 2023-10-24 PROCEDURE — 99204 OFFICE O/P NEW MOD 45 MIN: CPT | Performed by: DERMATOLOGY

## 2023-10-24 RX ORDER — KETOCONAZOLE 20 MG/ML
SHAMPOO TOPICAL
COMMUNITY
Start: 2023-10-09

## 2023-10-24 RX ORDER — LISDEXAMFETAMINE DIMESYLATE CAPSULES 40 MG/1
40 CAPSULE ORAL EVERY MORNING
Qty: 30 CAPSULE | Refills: 0 | Status: SHIPPED | OUTPATIENT
Start: 2023-10-24

## 2023-10-24 RX ORDER — ERYTHROMYCIN 5 MG/G
OINTMENT OPHTHALMIC
COMMUNITY
Start: 2023-09-15

## 2023-10-24 RX ORDER — CLONAZEPAM 0.5 MG/1
0.5 TABLET ORAL 2 TIMES DAILY PRN
Qty: 60 TABLET | Refills: 1 | Status: SHIPPED | OUTPATIENT
Start: 2023-10-24

## 2023-10-24 RX ORDER — FLUCONAZOLE 150 MG/1
150 TABLET ORAL WEEKLY
COMMUNITY
Start: 2023-09-07

## 2023-10-24 RX ORDER — PREDNISONE 20 MG/1
TABLET ORAL
COMMUNITY
Start: 2023-10-15 | End: 2023-10-28

## 2023-10-24 RX ORDER — CYCLOBENZAPRINE HCL 5 MG
TABLET ORAL
COMMUNITY

## 2023-10-24 RX ORDER — DEXTROAMPHETAMINE SACCHARATE, AMPHETAMINE ASPARTATE, DEXTROAMPHETAMINE SULFATE AND AMPHETAMINE SULFATE 2.5; 2.5; 2.5; 2.5 MG/1; MG/1; MG/1; MG/1
TABLET ORAL
Qty: 15 TABLET | Refills: 0 | Status: SHIPPED | OUTPATIENT
Start: 2023-10-24

## 2023-10-24 RX ORDER — TRIAMCINOLONE ACETONIDE 1 MG/G
OINTMENT TOPICAL 2 TIMES DAILY
Qty: 453.6 G | Refills: 0 | Status: SHIPPED | OUTPATIENT
Start: 2023-10-24

## 2023-10-24 NOTE — PATIENT INSTRUCTIONS
Assessment and Plan:  Based on a thorough discussion of this condition and the management approach to it (including a comprehensive discussion of the known risks, side effects and potential benefits of treatment), the patient (family) agrees to implement the following specific plan:  - Recommend sensitive skin care regimen  - detergent free of dyes and perfumes (example, free and clear). Try washing clothes with extra rinse cycle. - Short lukewarm showers  - White dove bar soap  - Recommend moisturizing whole body with Creams multiple times a day (examples: Cetaphil, CeraVe. and Eucerin)  - avoid aerosols and fragrances in the home (candles, plug ins, perfume, air freshener, etc)   Start Triamcinolone 0.1 % ointment apply twice a day for 2 weeks. Discontinue all supplements at this time.

## 2023-10-24 NOTE — QUICK NOTE
40 y.o. female who has a past medical history of Anxiety, Essential tremor, IBS, PTSD, Raynaud's disease presented in ED visit for generalized skin rash since 3 to 4 weeks ago. Patient reported the rashes initially started from her bilateral groin area before spreading to trunk and extremities, associated with ill defined erythema and pruritus. She presented due to worsening itching and burning sensation in groin. She did recall a possible exposure to her mother-in-law who was also having similar rashes. Patient endorsed the rashes was spreading despite she tried multiple medications of unclear duration (prednisone 40mg, Abx (erythromycin, Keflex, doxycycline), antihistamines and several topical creams I.e. 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 Bing Sheth, a dermatologist who performed a shave and punch biopsy of right thigh. She tried to live in a hotel in the past week with no improvement either. Denied any new bedding stuffs, detergent, new food. Denied any objective fever or chills. Per Dr. Britton Hung in ED, there was no mucosal involvement, facial edema, or desquamation. Lymphadenopathy noted in groin. No history of new medications in the 2 months preceding rash. Labs reviewed; eosinophilia elevated to 1.62. At this time, suspected either topical or systemic allergen leading to a diffuse contact dermatitis. Unlikely drug reaction such as DRESS syndrome or morbilliform drug eruption given history. Recommended using triamcinolone 0.1% ointment twice daily for 2 weeks to pruritic regions in groin until followup appointment at [facility] this week. Patient counseled to bring in biopsy report from outside office for evaluation. Advised strict return precautions; please return to ED if facial edema, desquamation, or systemic symptoms ar noted.

## 2023-10-24 NOTE — LETTER
October 24, 2023     Patient: Rhea Ruiz  YOB: 1980  Date of Visit: 10/24/2023      To Whom it May Concern:    Rhea Ruiz is under my professional care. Devi Martines was seen in my office on 10/24/2023. Devi Martines may return to work on 10/30/2023 . If you have any questions or concerns, please don't hesitate to call.          Sincerely,          DERM NURSE JOSE LUIS FORTUNE        CC: No Recipients

## 2023-10-24 NOTE — PROGRESS NOTES
[facility] Note     Patient Name: Amandeep Johnson  Encounter Date: 10/24/2023    Have you been cared for by a [facility] Dermatologist in the last 3 years and, if so, which description applies to you? NO. I am considered a "new" patient and must complete all patient intake questions. I am FEMALE/of child-bearing potential.    REVIEW OF SYSTEMS:  Have you recently had or currently have any of the following? Recent fever or chills? YES, fever 2 days ago   Any non-healing wound? YES,   Are you pregnant or planning to become pregnant? No  Are you currently or planning to be nursing or breast feeding? No   PAST MEDICAL HISTORY:  Have you personally ever had or currently have any of the following? If "YES," then please provide more detail. Skin cancer (such as Melanoma, Basal Cell Carcinoma, Squamous Cell Carcinoma? No  Tuberculosis, HIV/AIDS, Hepatitis B or C: No  Radiation Treatment No   HISTORY OF IMMUNOSUPPRESSION:   Do you have a history of any of the following:  Systemic Immunosuppression such as Diabetes, Biologic or Immunotherapy, Chemotherapy, Organ Transplantation, Bone Marrow Transplantation? No    Answering "YES" requires the addition of the dotphrase "IMMUNOSUPPRESSED" as the first diagnosis of the patient's visit. FAMILY HISTORY:  Any "first degree relatives" (parent, brother, sister, or child) with the following? Skin Cancer, Pancreatic or Other Cancer? No   PATIENT EXPERIENCE:    Do you want the Dermatologist to perform a COMPLETE skin exam today including a clinical examination under the "bra and underwear" areas? NO  If necessary, do we have your permission to call and leave a detailed message on your Preferred Phone number that includes your specific medical information?   Yes      Allergies   Allergen Reactions    Cephalexin Nausea Only      With abdominal pain    Other Other (See Comments)    Penicillins Other (See Comments)     unknown    Vancomycin Hives     hives Penicillins Rash    Wheat Bran - Food Allergy GI Intolerance and Rash      Current Outpatient Medications:     acyclovir (ZOVIRAX) 400 MG tablet, Take 400 mg by mouth daily, Disp: , Rfl:     amphetamine-dextroamphetamine (ADDERALL, 10MG,) 10 mg tablet, 1/2 tablet in the afternoon, Disp: 15 tablet, Rfl: 0    Calcium Carb-Cholecalciferol 1000-800 MG-UNIT TABS, daily , Disp: , Rfl:     cetirizine (ZyrTEC) 10 mg tablet, Take 10 mg by mouth daily at bedtime, Disp: , Rfl:     clonazePAM (KlonoPIN) 0.5 mg tablet, Take 1 tablet (0.5 mg total) by mouth 2 (two) times a day as needed for anxiety, Disp: 60 tablet, Rfl: 1    doxycycline hyclate (VIBRA-TABS) 100 mg tablet, Take 100 mg by mouth 2 (two) times a day, Disp: , Rfl:     Clary 0.35 MG tablet, , Disp: , Rfl:     fluconazole (DIFLUCAN) 150 mg tablet, Take 150 mg by mouth once a week, Disp: , Rfl:     fluticasone (FLONASE) 50 mcg/act nasal spray, 2 sprays into each nostril daily, Disp: , Rfl:     hydrOXYzine HCL (ATARAX) 25 mg tablet, Take 1 tablet (25 mg total) by mouth every 6 (six) hours, Disp: 12 tablet, Rfl: 0    ketoconazole (NIZORAL) 2 % shampoo, USE AS A BODY WASH TO THE AFFECTED AREAS 2-3 TIMES A WEEK, Disp: , Rfl:     lisdexamfetamine (Vyvanse) 40 MG capsule, Take 1 capsule (40 mg total) by mouth every morning Max Daily Amount: 40 mg, Disp: 30 capsule, Rfl: 0    Multiple Vitamins-Minerals (MULTIVITAMIN ADULT PO), Take by mouth daily, Disp: , Rfl:     nystatin (MYCOSTATIN) 500,000 units/5 mL suspension, RINSE WITH 5ML BY MOUTH 4 TIMES A DAY, Disp: , Rfl:     Omega-3 Fatty Acids (FISH OIL) 1,000 mg, Take 1,000 mg by mouth daily , Disp: , Rfl:     permethrin (ELIMITE) 5 % cream, Apply to affected area once, Disp: 60 g, Rfl: 0    propranolol (INDERAL) 20 mg tablet, Take 1 tablet (20 mg total) by mouth in the morning, Disp: 90 tablet, Rfl: 3    Turmeric 500 MG TABS, Take by mouth, Disp: , Rfl:     albuterol (PROVENTIL HFA,VENTOLIN HFA) 90 mcg/act inhaler, , Disp: , Rfl:     buPROPion (WELLBUTRIN SR) 100 mg 12 hr tablet, TAKE 1 TABLET (100 MG TOTAL) BY MOUTH IN THE MORNING (Patient not taking: Reported on 10/24/2023), Disp: 90 tablet, Rfl: 2    clobetasol (TEMOVATE) 0.05 % ointment, Apply topically 2 (two) times a day (Patient not taking: Reported on 10/24/2023), Disp: 30 g, Rfl: 2    cyclobenzaprine (FLEXERIL) 5 mg tablet, Take 1 tablet by oral route. (Patient not taking: Reported on 10/24/2023), Disp: , Rfl:     erythromycin (ILOTYCIN) ophthalmic ointment, APPLY ONE APPLICATOR OPHTHALMIC EYE FOUR TIMES PER DAY FOR 7 DAYS ONE CM RIBBON AT AFFECTED EYE (Patient not taking: Reported on 10/24/2023), Disp: , Rfl:     Mucinex D Max Strength 120-1200 MG TB12, TAKE 1 TABLET BY MOUTH 2 TIMES A DAY FOR 10 DAYS. (Patient not taking: Reported on 10/24/2023), Disp: , Rfl:     mupirocin (BACTROBAN) 2 % ointment, Apply topically 3 (three) times a day (Patient not taking: Reported on 10/24/2023), Disp: 22 g, Rfl: 5    neomycin-polymyxin-hydrocortisone (CORTISPORIN) otic solution, INSTILL FOUR DROPS OTIC EAR FOUR TIMES PER DAY FOR 7 DAYS USE IN AFFECTED EAR (Patient not taking: Reported on 10/24/2023), Disp: , Rfl:     predniSONE 20 mg tablet, Take by mouth (Patient not taking: Reported on 10/24/2023), Disp: , Rfl:     triamcinolone (KENALOG) 0.1 % ointment, Apply topically 2 (two) times a day Please apply to your affected groin area. (Patient not taking: Reported on 10/24/2023), Disp: 80 g, Rfl: 0          Whom besides the patient is providing clinical information about today's encounter? NO ADDITIONAL HISTORIAN (patient alone provided history)    Physical Exam and Assessment/Plan by Diagnosis:    RASH    Physical Exam:  (Anatomic Location); (Size and Morphological Description); (Differential Diagnosis):  Diffuse desquamation and erythema   Pertinent Positives:  Pertinent Negatives:     Additional History of Present Condition:  Patient states rash started 3 weeks ago in the groin area and has spread to rest of body, patient has tried prednisone, antibiotics, premethrin, ivermectin twice, and is currently on doxycycline. She was seen in the ED Sunday night. Patient was given triamcinolone but she has yet to  medication. Patient has seen another dermatologists and biopsy was done at OSH which showed a dermal hypersensitivity reaction. She has been prescribing gold bond cream to her body. Attempted to stay at a hotel for 1 week to see if her house was infested and her rash did not significantly improve. She has never tried any topical medications. Assessment and Plan:  Based on a thorough discussion of this condition and the management approach to it (including a comprehensive discussion of the known risks, side effects and potential benefits of treatment), the patient (family) agrees to implement the following specific plan:  Start Triamcinolone 0.1 % ointment apply twice a day for 2 weeks to areas of rash but not to face or groin. Recommend sensitive skin care regimen  - detergent free of dyes and perfumes (example, free and clear). Try washing clothes with extra rinse cycle. - Short lukewarm showers  - White dove bar soap  - Recommend moisturizing whole body with vaseline immediately after getting out of shower in areas you are not applying the triamcinolone  - avoid aerosols and fragrances in the home (candles, plug ins, perfume, air freshener, etc)   Discontinue all supplements at this time. Can RTC in 2-4 weeks if not improving.         Scribe Attestation      I,:  Jade Sprague, MA am acting as a scribe while in the presence of the attending physician.:       I,:  Keisha Sears MD personally performed the services described in this documentation    as scribed in my presence.:            Seth Norris  PGY3 Dermatology Resident

## 2023-10-24 NOTE — TELEPHONE ENCOUNTER
Rec'd Fax of Derm Records  -- Pt being seen this afternoon    Scanned fax and sent to Dr Malorie Delaney and [facility]

## 2023-10-27 ENCOUNTER — TELEPHONE (OUTPATIENT)
Age: 43
End: 2023-10-27

## 2023-10-27 NOTE — TELEPHONE ENCOUNTER
Hansel Lunsford From [facility] called requesting medical records to be faxed over .    Phone number  :[phone] , fax [phone]

## 2023-11-09 ENCOUNTER — TELEPHONE (OUTPATIENT)
Dept: VASCULAR SURGERY | Facility: CLINIC | Age: 43
End: 2023-11-09

## 2023-11-22 DIAGNOSIS — F90.0 ATTENTION DEFICIT HYPERACTIVITY DISORDER (ADHD), PREDOMINANTLY INATTENTIVE TYPE: ICD-10-CM

## 2023-11-22 RX ORDER — DEXTROAMPHETAMINE SACCHARATE, AMPHETAMINE ASPARTATE, DEXTROAMPHETAMINE SULFATE AND AMPHETAMINE SULFATE 2.5; 2.5; 2.5; 2.5 MG/1; MG/1; MG/1; MG/1
TABLET ORAL
Qty: 15 TABLET | Refills: 0 | Status: SHIPPED | OUTPATIENT
Start: 2023-11-22

## 2023-11-22 RX ORDER — LISDEXAMFETAMINE DIMESYLATE CAPSULES 40 MG/1
40 CAPSULE ORAL EVERY MORNING
Qty: 30 CAPSULE | Refills: 0 | Status: SHIPPED | OUTPATIENT
Start: 2023-11-22

## 2023-11-28 DIAGNOSIS — F41.9 ANXIETY: ICD-10-CM

## 2023-11-28 RX ORDER — CLONAZEPAM 0.5 MG/1
0.5 TABLET ORAL 2 TIMES DAILY PRN
Qty: 60 TABLET | Refills: 1 | Status: SHIPPED | OUTPATIENT
Start: 2023-11-28

## 2023-12-07 ENCOUNTER — OFFICE VISIT (OUTPATIENT)
Dept: FAMILY MEDICINE CLINIC | Facility: CLINIC | Age: 43
End: 2023-12-07
Payer: COMMERCIAL

## 2023-12-07 VITALS
DIASTOLIC BLOOD PRESSURE: 80 MMHG | BODY MASS INDEX: 19.62 KG/M2 | WEIGHT: 125 LBS | OXYGEN SATURATION: 100 % | RESPIRATION RATE: 16 BRPM | HEART RATE: 79 BPM | SYSTOLIC BLOOD PRESSURE: 140 MMHG | TEMPERATURE: 97.8 F | HEIGHT: 67 IN

## 2023-12-07 DIAGNOSIS — F90.0 ATTENTION DEFICIT HYPERACTIVITY DISORDER (ADHD), PREDOMINANTLY INATTENTIVE TYPE: ICD-10-CM

## 2023-12-07 DIAGNOSIS — Z00.00 HEALTH MAINTENANCE EXAMINATION: Primary | ICD-10-CM

## 2023-12-07 DIAGNOSIS — F41.9 ANXIETY: ICD-10-CM

## 2023-12-07 DIAGNOSIS — F41.0 PANIC ATTACKS: ICD-10-CM

## 2023-12-07 DIAGNOSIS — F33.2 SEVERE EPISODE OF RECURRENT MAJOR DEPRESSIVE DISORDER, WITHOUT PSYCHOTIC FEATURES (HCC): ICD-10-CM

## 2023-12-07 PROBLEM — F19.90 SUBSTANCE USE: Status: RESOLVED | Noted: 2022-05-04 | Resolved: 2023-12-07

## 2023-12-07 PROBLEM — R09.81 NASAL CONGESTION: Status: RESOLVED | Noted: 2021-08-23 | Resolved: 2023-12-07

## 2023-12-07 PROCEDURE — 99214 OFFICE O/P EST MOD 30 MIN: CPT | Performed by: FAMILY MEDICINE

## 2023-12-07 PROCEDURE — 99396 PREV VISIT EST AGE 40-64: CPT | Performed by: FAMILY MEDICINE

## 2023-12-07 RX ORDER — ESCITALOPRAM OXALATE 10 MG/1
10 TABLET ORAL DAILY
Qty: 30 TABLET | Refills: 5 | Status: SHIPPED | OUTPATIENT
Start: 2023-12-07 | End: 2024-06-04

## 2023-12-07 RX ORDER — IBUPROFEN 600 MG/1
600 TABLET ORAL EVERY 6 HOURS PRN
COMMUNITY
Start: 2023-11-11

## 2023-12-07 RX ORDER — DIAZEPAM 5 MG/1
5 TABLET ORAL 3 TIMES DAILY PRN
COMMUNITY
Start: 2023-11-11 | End: 2023-12-07

## 2023-12-07 NOTE — PATIENT INSTRUCTIONS
Health maintenance is performed. Anxiety and depression seems to have been exacerbated by her work environment. Begin Lexapro 10 mg daily. Suggest giving a counselor to work on issues including dealing with her anxiety and panic attacks and people at work. At worst, might benefit from a different job. Not necessary to do a drug screen. Continue Vyvanse and Adderall. Recheck in 1 month. Daniel Chris               800 [facility]. in [place]. [phone]. Tressia Spine               [place]. Netta Mast              900 [facility]. [place] [address]              [phone]    [address]   [address] [place] [address],[address], [address] [place] [address]  [phone]  www. iCeutica. [place]ork[place]    [facility], [address], [address] [place] [address]  [phone]  www. Colyar Consulting GroupKettering Health Behavioral Medical CenterVantage Sportsates. [place]    [facility]  1340 [facility], [facility]5 [place], [place] [id number] [address] [place] [address] Rd. com    Center for [address]  [phone]  www. centerTrinity Health.[place] Storkwitz  *age 8 and older    [facility]  [address] [facility]  [facility], [address] [place] [address]  [phone]  www. [email or web address]    Edith Adame, Ph.D.  [address] floor [address]  [phone]  * and early elementary school age    [facility]/Rochelle Aquino [address] [place], 350 [facility]  [phone]  *age 1 and older    [phone] [address]S. [facility] 59  N, 254 [facility]  [phone]   www.Castle HillAscension [facility] Memorial HospitalEverSpin Technologies. Textual [employer] Solutions  *age 10 and older    Jennifer Martinez, Ph.D.  520 [facility], 330 [facility], [address]  [phone]  *age 11 and older    [facility] - CLOSED  [address] [facility], [address] [address], Susan. \Hospital Has a New Name and Outlook.\""  [phone]  [email or web address]  *age 3 and older    June Gordon  [address] [facility], [address] [place] [address]  [phone]   *age 1 and older    Philippe Alarcon Psy.D  [facility]. [address]  [phone]  *age 15 and older    [facility]aling Radha Sheffield, 1100 [facility], [address] [place] [address]   [phone]  www.[employer] Solar  *Does not accept insurance, would be out-of-pocket cost.    Nick Ye, Lisa  178 [facility] 24E, [facility]5 [facility]  [phone]   *age 1 and older    [address] [place] [address]. [address], 3100 [facility]  [phone]  www. Live Calendars  *age 11 and older    [facility]  407 [facility]. [address], 2304 [facility] 121  [phone]  www.[email or web address]. [employer]  *age 11 and older    [facility] Child and [facility] DEEPIKA Gomez Psy.D.  8401 [facility],7Th Floor South, [facility] [address] [place] [address] 121  *age 11 and older    Rachna De Los Santos  [address], [address]  [phone]  *age 8 and older    308 [facility], [facility], [address], [address]  [place], [address] [place] [address]  [phone]  *age 3 and older    Amon Curling, [address] [facility], 9515 [facility]  Frankie DONATOrobbie  [phone]  www. childOur Lady of Fat[facility] HospitalstFirst[facility]. com  *age 3 and older    [facility], 65 [facility]  [phone]  www. "InvierteMe,SL"  *age 3 and older  *experience working with children with [place] [address] [address].  [address] Yohan Anne [address] 10, [place], [address]  [phone] (to schedule at any office)  [email or web address]  Stevo Trinidad, 330 [facility]), [address]  [place], 220 [facility]  [phone]  *age 6 and older    Shandra Padron, Ph.D.  262 [facility], [address], 1515 [facility]  [phone]  * - college age    Dr. Tino Best/Flores Wheeler, [address] [facility], 455 [facility], 1 [facility] Y[address] 2  [place], 220 [facility]  [phone]   *older children & adolescents (no play therapy)    [employer]/ Dr. Rohan Huertas  9301 [facility], [address] [facility], 220 [facility]  [phone]  www. [employer]  [address], [address]  [phone]  Retellity  *over 10 years (1 South Korean speaking counselor)    Counseling/Psychiatry (can prescribe medication if necessary)    [facility]   1256 [facility], [facility]5 [facility]  [phone]  *providers services for patients with [address] [facility] for [place]  705 [facility], [address]   [phone]  www. WeottaCleveland Clinic Hillcrest HospitalOpenTable    [facility]  [address], 148 [facility], [facility] [address]  [email or web address]    [facility]ace Walk-in intakes M-F 9am-5pm  [address] location on [place], [address] or [facility] locations at [facility] or [phone] ([phone]) Clarke Nichols. or 401 [facility] by Julieta Larson. [facility] Adult and Pediatric Psychiatry - [address], [address] [place] [address], [address] [facility] Adult and Pediatric Psychiatry - Dionna  [address], [address] [place] [address]  1111 [facility], [facility]5 [facility],  [phone]    [address] Neeraj ace.    [address] [address]  [phone]  Nimb[employer]   [address] [place] [address]  [phone]  www.Vinja    [facility]  [address] [place] [address] (p) [phone]    Wellness Visit for Adults   AMBULATORY CARE:   A wellness visit  is when you see your healthcare provider to get screened for health problems. Your healthcare provider will also give you advice on how to stay healthy. Write down your questions so you remember to ask them. Ask your healthcare provider how often you should have a wellness visit. What happens at a wellness visit:  Your healthcare provider will ask about your health, and your family history of health problems. This includes high blood pressure, heart disease, and cancer. He or she will ask if you have symptoms that concern you, if you smoke, and about your mood. You may also be asked about your intake of medicines, supplements, food, and alcohol. Any of the following may be done: Your weight  will be checked. Your height may also be checked so your body mass index (BMI) can be calculated. Your BMI shows if you are at a healthy weight. Your blood pressure  and heart rate will be checked. Your temperature may also be checked. Blood and urine tests  may be done. Blood tests may be done to check your cholesterol levels. Abnormal cholesterol levels increase your risk for heart disease and stroke. You may also need a blood or urine test to check for diabetes if you are at increased risk. Urine tests may be done to look for signs of an infection or kidney disease. A physical exam  includes checking your heartbeat and lungs with a stethoscope. Your healthcare provider may also check your skin to look for sun damage. Screening tests  may be recommended. A screening test is done to check for diseases that may not cause symptoms.  The screening tests you may need depend on your age, gender, family history, and lifestyle habits. For example, colorectal screening may be recommended if you are 48years old or older. Screening tests you need if you are a woman:   A Pap smear  is used to screen for cervical cancer. Pap smears are usually done every 3 to 5 years depending on your age. You may need them more often if you have had abnormal Pap smear test results in the past. Ask your healthcare provider how often you should have a Pap smear. A mammogram  is an x-ray of your breasts to screen for breast cancer. Experts recommend mammograms every 2 years starting at age 48 years. You may need a mammogram at age 52 years or younger if you have an increased risk for breast cancer. Talk to your healthcare provider about when you should start having mammograms and how often you need them. Vaccines you may need:   Get an influenza vaccine  every year. The influenza vaccine protects you from the flu. Several types of viruses cause the flu. The viruses change over time, so new vaccines are made each year. Get a tetanus-diphtheria (Td) booster vaccine  every 10 years. This vaccine protects you against tetanus and diphtheria. Tetanus is a severe infection that may cause painful muscle spasms and lockjaw. Diphtheria is a severe bacterial infection that causes a thick covering in the back of your mouth and throat. Get a human papillomavirus (HPV) vaccine  if you are female and aged 23 to 32 or male 23 to 24 and never received it. This vaccine protects you from HPV infection. HPV is the most common infection spread by sexual contact. HPV may also cause vaginal, penile, and anal cancers. Get a pneumococcal vaccine  if you are aged 72 years or older. The pneumococcal vaccine is an injection given to protect you from pneumococcal disease. Pneumococcal disease is an infection caused by pneumococcal bacteria. The infection may cause pneumonia, meningitis, or an ear infection.     Get a shingles vaccine  if you are 61 or older, even if you have had shingles before. The shingles vaccine is an injection to protect you from the varicella-zoster virus. This is the same virus that causes chickenpox. Shingles is a painful rash that develops in people who had chickenpox or have been exposed to the virus. How to eat healthy:  My Plate is a model for planning healthy meals. It shows the types and amounts of foods that should go on your plate. Fruits and vegetables make up about half of your plate, and grains and protein make up the other half. A serving of dairy is included on the side of your plate. The amount of calories and serving sizes you need depends on your age, gender, weight, and height. Examples of healthy foods are listed below:  Eat a variety of vegetables  such as dark green, red, and orange vegetables. You can also include canned vegetables low in sodium (salt) and frozen vegetables without added butter or sauces. Eat a variety of fresh fruits , canned fruit in 100% juice, frozen fruit, and dried fruit. Include whole grains. At least half of the grains you eat should be whole grains. Examples include whole-wheat bread, wheat pasta, brown rice, and whole-grain cereals such as oatmeal.    Eat a variety of protein foods such as seafood (fish and shellfish), lean meat, and poultry without skin (turkey and chicken). Examples of lean meats include pork leg, shoulder, or tenderloin, and beef round, sirloin, tenderloin, and extra lean ground beef. Other protein foods include eggs and egg substitutes, beans, peas, soy products, nuts, and seeds. Choose low-fat dairy products such as skim or 1% milk or low-fat yogurt, cheese, and cottage cheese. Limit unhealthy fats  such as butter, hard margarine, and shortening. Exercise:  Exercise at least 30 minutes per day on most days of the week. Some examples of exercise include walking, biking, dancing, and swimming.  You can also fit in more physical activity by taking the stairs instead of the elevator or parking farther away from stores. Include muscle strengthening activities 2 days each week. Regular exercise provides many health benefits. It helps you manage your weight, and decreases your risk for type 2 diabetes, heart disease, stroke, and high blood pressure. Exercise can also help improve your mood. Ask your healthcare provider about the best exercise plan for you. General health and safety guidelines:   Do not smoke. Nicotine and other chemicals in cigarettes and cigars can cause lung damage. Ask your healthcare provider for information if you currently smoke and need help to quit. E-cigarettes or smokeless tobacco still contain nicotine. Talk to your healthcare provider before you use these products. Limit alcohol. A drink of alcohol is 12 ounces of beer, 5 ounces of wine, or 1½ ounces of liquor. Lose weight, if needed. Being overweight increases your risk of certain health conditions. These include heart disease, high blood pressure, type 2 diabetes, and certain types of cancer. Protect your skin. Do not sunbathe or use tanning beds. Use sunscreen with a SPF 15 or higher. Apply sunscreen at least 15 minutes before you go outside. Reapply sunscreen every 2 hours. Wear protective clothing, hats, and sunglasses when you are outside. Drive safely. Always wear your seatbelt. Make sure everyone in your car wears a seatbelt. A seatbelt can save your life if you are in an accident. Do not use your cell phone when you are driving. This could distract you and cause an accident. Pull over if you need to make a call or send a text message. Practice safe sex. Use latex condoms if are sexually active and have more than one partner. Your healthcare provider may recommend screening tests for sexually transmitted infections (STIs). Wear helmets, lifejackets, and protective gear.   Always wear a helmet when you ride a bike or motorcycle, go skiing, or play sports that could cause a head injury. Wear protective equipment when you play sports. Wear a lifejacket when you are on a boat or doing water sports. © Copyright [facility] 2023 Information is for End User's use only and may not be sold, redistributed or otherwise used for commercial purposes. The above information is an  only. It is not intended as medical advice for individual conditions or treatments. Talk to your doctor, nurse or pharmacist before following any medical regimen to see if it is safe and effective for you.

## 2023-12-07 NOTE — PROGRESS NOTES
Chief Complaint   Patient presents with    Anxiety    Depression        HPI   Here for annual physical and follow-up of anxiety, depression, and ADHD. Has not been doing well. Struggling with anxiety. Depression is worse. Grieving the loss of her estranged  from more than a year ago. Since then, has had a couple relationships which have ended and is currently single. And raising her daughter. Does get support from her mother. Continues on Vyvanse with occasional Adderall for long days. In the past, was on Prozac and Wellbutrin which she thought contributed to weight gain and Prozac was stopped. Wellbutrin may have been contributing to her anxiety so that was stopped. 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 Hearing rumors about herself from co-workers. Past Medical History:   Diagnosis Date    Anxiety     Attention deficit hyperactivity disorder (ADHD), predominantly inattentive type 07/31/2015    Cystic fibrosis carrier 05/24/2021    Formatting of this note might be different from the original. Partner declined Testing    Essential tremor 05/01/2023    IBS (irritable bowel syndrome)     Panic attacks 10/01/2021    PTSD (post-traumatic stress disorder) 10/17/2022    Raynaud's disease     Recurrent major depressive disorder, in full remission ([facility]) 11/05/2018    2 episodes [facility] [place]    Restless leg syndrome 11/05/2018        Past Surgical History:   Procedure Laterality Date    AUGMENTATION BREAST  2013    AUGMENTATION MAMMAPLASTY      2 capsular contractures on left corrected, most recent 2015.     SHOULDER SURGERY Right     Two surgeries to repair recurrent dislocation    WISDOM TOOTH EXTRACTION         Social History     Tobacco Use    Smoking status: Never    Smokeless tobacco: Never   Substance Use Topics    Alcohol use: Not Currently       Social History     Social History Narrative Was  in  to [place].   after a rough couple of years. He overdosed and  . Dating [place] since . Broke up . Daughter, Cynthia Mcguire, Casey as of . Has an apartment in [place]. 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 Enjoys hiking, yoga. The following portions of the patient's history were reviewed and updated as appropriate: allergies, current medications, past family history, past medical history, past social history, past surgical history, and problem list.      Review of Systems       /80   Pulse 79   Temp 97.8 °F (36.6 °C) (Temporal)   Resp 16   Ht 5' 7" (1.702 m)   Wt 56.7 kg (125 lb)   LMP 2023 (Approximate)   SpO2 100%   BMI 19.58 kg/m²      Physical Exam   Healthy appearing individual in no acute distress. Extraocular motions are intact. Both ear drums are white. Hearing is grossly intact. Throat reveals no erythema. Teeth are in good repair. No neck nodes or thyromegaly. Lungs are clear. Heart regular with no murmurs or gallops. Abdomen is soft and nontender. No leg edema. Skin reveals no apparent rash. Neurologic grossly within normal limits. Normal mood and affect. Musculoskeletal exam grossly within normal limits.                 Current Outpatient Medications:     acyclovir (ZOVIRAX) 400 MG tablet, Take 400 mg by mouth daily, Disp: , Rfl:     amphetamine-dextroamphetamine (ADDERALL, 10MG,) 10 mg tablet, 1/2 tablet in the afternoon (Patient taking differently: 1/2 tablet in the afternoon  PRN), Disp: 15 tablet, Rfl: 0    Calcium Carb-Cholecalciferol 1000-800 MG-UNIT TABS, daily , Disp: , Rfl:     cetirizine (ZyrTEC) 10 mg tablet, Take 10 mg by mouth daily at bedtime, Disp: , Rfl:     clonazePAM (KlonoPIN) 0.5 mg tablet, Take 1 tablet (0.5 mg total) by mouth 2 (two) times a day as needed for anxiety, Disp: 60 tablet, Rfl: 1 doxycycline hyclate (VIBRA-TABS) 100 mg tablet, Take 100 mg by mouth 2 (two) times a day, Disp: , Rfl:     Clary 0.35 MG tablet, , Disp: , Rfl:     escitalopram (LEXAPRO) 10 mg tablet, Take 1 tablet (10 mg total) by mouth daily, Disp: 30 tablet, Rfl: 5    fluconazole (DIFLUCAN) 150 mg tablet, Take 150 mg by mouth once a week, Disp: , Rfl:     fluticasone (FLONASE) 50 mcg/act nasal spray, 2 sprays into each nostril daily, Disp: , Rfl:     hydrocortisone 2.5 % ointment, , Disp: , Rfl:     ibuprofen (MOTRIN) 600 mg tablet, Take 600 mg by mouth every 6 (six) hours as needed, Disp: , Rfl:     ketoconazole (NIZORAL) 2 % shampoo, USE AS A BODY WASH TO THE AFFECTED AREAS 2-3 TIMES A WEEK, Disp: , Rfl:     lisdexamfetamine (Vyvanse) 40 MG capsule, Take 1 capsule (40 mg total) by mouth every morning Max Daily Amount: 40 mg, Disp: 30 capsule, Rfl: 0    Multiple Vitamins-Minerals (MULTIVITAMIN ADULT PO), Take by mouth daily, Disp: , Rfl:     nystatin (MYCOSTATIN) 500,000 units/5 mL suspension, RINSE WITH 5ML BY MOUTH 4 TIMES A DAY, Disp: , Rfl:     Omega-3 Fatty Acids (FISH OIL) 1,000 mg, Take 1,000 mg by mouth daily , Disp: , Rfl:     propranolol (INDERAL) 20 mg tablet, Take 1 tablet (20 mg total) by mouth in the morning, Disp: 90 tablet, Rfl: 3    triamcinolone (KENALOG) 0.1 % ointment, Apply topically 2 (two) times a day For 2 weeks, Disp: 453.6 g, Rfl: 0    Turmeric 500 MG TABS, Take by mouth, Disp: , Rfl:     albuterol (PROVENTIL HFA,VENTOLIN HFA) 90 mcg/act inhaler, , Disp: , Rfl:     clobetasol (TEMOVATE) 0.05 % ointment, Apply topically 2 (two) times a day (Patient not taking: Reported on 10/24/2023), Disp: 30 g, Rfl: 2    hydrOXYzine HCL (ATARAX) 25 mg tablet, Take 1 tablet (25 mg total) by mouth every 6 (six) hours (Patient not taking: Reported on 12/7/2023), Disp: 12 tablet, Rfl: 0    mupirocin (BACTROBAN) 2 % ointment, Apply topically 3 (three) times a day (Patient not taking: Reported on 10/24/2023), Disp: 22 g, Rfl: 5     No problem-specific Assessment & Plan notes found for this encounter. Diagnoses and all orders for this visit:    Health maintenance examination    Severe episode of recurrent major depressive disorder, without psychotic features ([address])  -     escitalopram (LEXAPRO) 10 mg tablet; Take 1 tablet (10 mg total) by mouth daily    Anxiety    Attention deficit hyperactivity disorder (ADHD), predominantly inattentive type    Panic attacks    Other orders  -     Discontinue: diazepam (VALIUM) 5 mg tablet; Take 5 mg by mouth 3 (three) times a day as needed for muscle spasms (Patient not taking: Reported on 12/7/2023)  -     hydrocortisone 2.5 % ointment  -     ibuprofen (MOTRIN) 600 mg tablet; Take 600 mg by mouth every 6 (six) hours as needed        Patient Instructions   Health maintenance is performed. Anxiety and depression seems to have been exacerbated by her work environment. Begin Lexapro 10 mg daily. Suggest giving a counselor to work on issues including dealing with her anxiety and panic attacks and people at work. At worst, might benefit from a different job. Not necessary to do a drug screen. Continue Vyvanse and Adderall. Recheck in 1 month. Daniel Perez               [address] [facility]. in [place]. [phone]. [facility]. Netta [facility]              [address] [facility]. [place] [address]              [phone]    [address]   [address] [place] [address],[address], [address] [place] [address]  [phone]  www. [employer]. [employer]    [facility], [address], [address] [place] [address]  [phone]  www. ElastifileMadigan Army Medical CenterShift Network. [employer]    [facility]  [address]  [facility], [facility]5 [place], [place] [id number] 3710 [facility].[address]. com    [facility]  [address], [facility]  [phone]  www. [facility]Lif[facility] [place].Qbix  *age 8 and older    [facility]  [address] [facility]  [place], [address] [facility]  [phone]  www. [email or web address]    Eric Kaur, Ph.D.  [address] floor [facility], [address] [place] [address]  [phone]  * and early elementary school age    [facility]/Rochelle Aquino [address] [facility] 800 [facility], 350 [facility]  [phone]  *age 1 and older    [facility]  [address] [place], [address] [facility]  [phone]   www.CITTIO. Qbix  *age 10 and older    Yina Hutson, Ph.D.  [address] [facility], [address] [place] [address], [address]  [phone]  *age 11 and older    [facility]  [address] [facility], [address] [place] [address]  [phone]  [email or web address] com  *age 3 and older    June Gordon  [address] [facility], [address] [place] [address]  [phone]   *age 1 and older    Mariam Becker Psy.D  [facility]. [address]  [phone]  *age 15 and older    [facility], [address] [facility], [address] [place] [address]   [phone]  [email or web address]  *Does not accept insurance, would be out-of-pocket cost.    Nicolasa Miller, Ed.D  [address] [facility] [address], 1515 [facility]  [phone]   *age 1 and older    [address] [place] [address] Antolin Solomon. [address], 3100 [facility]  [phone]  www. [employer]  *age 11 and older    [facility]  [address] [place] [address] [facility], [facility]4 [facility] 121  [phone]  www.3yy game platformpsych. Qbix  *age 11 and older    [facility] AT [facility] CenterNTIN XUAN KIMBLE for Child and Family Development  Tawana Gamble Psy.D.  [address] [facility],7Th Floor South, [facility], [address] [facility] [address]  *age 11 and older    Freeman Velez  [address], 57 [facility], 1515 [facility]  [phone]  *age 8 and older    [address] [facility], [facility], 2500 [facility], [address]  [place], [address] [place] [address]  [phone]  *age 3 and older    Hellen Joedanie, [address] [facility], 9515 [facility]  [facility]  [phone]  www. CareFamilysplaMetacafe. com  *age 3 and older    [facility], [address] [facility]  [phone]  www. [employer]Vision  *age 3 and older  *experience working with children with [place] [address] [address] [facility], [address] [facility], 1501 [facility], [address]  [phone] (to schedule at any office)  [email or web address]  Malina Glover, [address] [facility]), [address] [place], [address] [facility]  [phone]  *age 6 and older    Darlene Wheeler, Ph.D.  [address] [facility], [address], 1515 [facility]  [phone]  * - college age    Dr. Altagracia Best/Flores Ly, [address] [facility], 455 [facility], 1 [address] 2  [place], 220 [facility]  [phone]   *older children & adolescents (no play therapy)    Snyppit, deCarta/ Dr. Jesika Meehan  [address] [facility], [address] [facility], 220 [facility]  [phone]  www. BitWave.[employer]    [employer]  [address]. [facility], [facility], [address] [place] [address]  [phone]  Nonpareil  *over 10 years (1 Afghan speaking counselor)    Counseling/Psychiatry (can prescribe medication if necessary)    [facility]   [address] [facility], 1515 [facility]  [phone]  *providers services for patients with [address] [facility]  [address] [place] [address], [address] [place] [address]   [phone]  www. Big Game HuntersSelect Medical Specialty Hospital - [facility] SouthTRINA SOLAR LTDCommunity Health. [employer]    [facility]  [address], [address] [facility]  [place], 254 [facility]  [phone]  www.[email or web address]. org    [facility]ace Walk-in intakes M-F 9am-5pm  [address] [place] [address] location on [place], [address] or [facility] locations at [facility] or [phone] ([phone]) Fabio Granado. or [facility] by Grant Mackenzie. org [facility]    [facility] - [facility]  501 [facility], [address] [place] [address], [address] [place] [address] Adult and Pediatric Psychiatry - [facility]  4545 [facility] 2463 [facility], 254 [facility]  [address] [facility], 1215 [facility],  [phone]    [facility]. [facility] [address]  [phone]  PicVisions.is    [facility]   [address]  [phone]  www.[email or web address]    [facility]  [address] [place] [address] (p) [phone]    Wellness Visit for Adults   AMBULATORY CARE:   A wellness visit  is when you see your healthcare provider to get screened for health problems. Your healthcare provider will also give you advice on how to stay healthy. Write down your questions so you remember to ask them. Ask your healthcare provider how often you should have a wellness visit. What happens at a wellness visit:  Your healthcare provider will ask about your health, and your family history of health problems. This includes high blood pressure, heart disease, and cancer. He or she will ask if you have symptoms that concern you, if you smoke, and about your mood. You may also be asked about your intake of medicines, supplements, food, and alcohol. Any of the following may be done: Your weight  will be checked. Your height may also be checked so your body mass index (BMI) can be calculated. Your BMI shows if you are at a healthy weight.     Your blood pressure  and heart rate will be checked. Your temperature may also be checked. Blood and urine tests  may be done. Blood tests may be done to check your cholesterol levels. Abnormal cholesterol levels increase your risk for heart disease and stroke. You may also need a blood or urine test to check for diabetes if you are at increased risk. Urine tests may be done to look for signs of an infection or kidney disease. A physical exam  includes checking your heartbeat and lungs with a stethoscope. Your healthcare provider may also check your skin to look for sun damage. Screening tests  may be recommended. A screening test is done to check for diseases that may not cause symptoms. The screening tests you may need depend on your age, gender, family history, and lifestyle habits. For example, colorectal screening may be recommended if you are 48years old or older. Screening tests you need if you are a woman:   A Pap smear  is used to screen for cervical cancer. Pap smears are usually done every 3 to 5 years depending on your age. You may need them more often if you have had abnormal Pap smear test results in the past. Ask your healthcare provider how often you should have a Pap smear. A mammogram  is an x-ray of your breasts to screen for breast cancer. Experts recommend mammograms every 2 years starting at age 48 years. You may need a mammogram at age 52 years or younger if you have an increased risk for breast cancer. Talk to your healthcare provider about when you should start having mammograms and how often you need them. Vaccines you may need:   Get an influenza vaccine  every year. The influenza vaccine protects you from the flu. Several types of viruses cause the flu. The viruses change over time, so new vaccines are made each year. Get a tetanus-diphtheria (Td) booster vaccine  every 10 years. This vaccine protects you against tetanus and diphtheria.  Tetanus is a severe infection that may cause painful muscle spasms and lockjaw. Diphtheria is a severe bacterial infection that causes a thick covering in the back of your mouth and throat. Get a human papillomavirus (HPV) vaccine  if you are female and aged 23 to 32 or male 23 to 24 and never received it. This vaccine protects you from HPV infection. HPV is the most common infection spread by sexual contact. HPV may also cause vaginal, penile, and anal cancers. Get a pneumococcal vaccine  if you are aged 72 years or older. The pneumococcal vaccine is an injection given to protect you from pneumococcal disease. Pneumococcal disease is an infection caused by pneumococcal bacteria. The infection may cause pneumonia, meningitis, or an ear infection. Get a shingles vaccine  if you are 60 or older, even if you have had shingles before. The shingles vaccine is an injection to protect you from the varicella-zoster virus. This is the same virus that causes chickenpox. Shingles is a painful rash that develops in people who had chickenpox or have been exposed to the virus. How to eat healthy:  My Plate is a model for planning healthy meals. It shows the types and amounts of foods that should go on your plate. Fruits and vegetables make up about half of your plate, and grains and protein make up the other half. A serving of dairy is included on the side of your plate. The amount of calories and serving sizes you need depends on your age, gender, weight, and height. Examples of healthy foods are listed below:  Eat a variety of vegetables  such as dark green, red, and orange vegetables. You can also include canned vegetables low in sodium (salt) and frozen vegetables without added butter or sauces. Eat a variety of fresh fruits , canned fruit in 100% juice, frozen fruit, and dried fruit. Include whole grains. At least half of the grains you eat should be whole grains.  Examples include whole-wheat bread, wheat pasta, brown rice, and whole-grain cereals such as oatmeal.    Eat a variety of protein foods such as seafood (fish and shellfish), lean meat, and poultry without skin (turkey and chicken). Examples of lean meats include pork leg, shoulder, or tenderloin, and beef round, sirloin, tenderloin, and extra lean ground beef. Other protein foods include eggs and egg substitutes, beans, peas, soy products, nuts, and seeds. Choose low-fat dairy products such as skim or 1% milk or low-fat yogurt, cheese, and cottage cheese. Limit unhealthy fats  such as butter, hard margarine, and shortening. Exercise:  Exercise at least 30 minutes per day on most days of the week. Some examples of exercise include walking, biking, dancing, and swimming. You can also fit in more physical activity by taking the stairs instead of the elevator or parking farther away from stores. Include muscle strengthening activities 2 days each week. Regular exercise provides many health benefits. It helps you manage your weight, and decreases your risk for type 2 diabetes, heart disease, stroke, and high blood pressure. Exercise can also help improve your mood. Ask your healthcare provider about the best exercise plan for you. General health and safety guidelines:   Do not smoke. Nicotine and other chemicals in cigarettes and cigars can cause lung damage. Ask your healthcare provider for information if you currently smoke and need help to quit. E-cigarettes or smokeless tobacco still contain nicotine. Talk to your healthcare provider before you use these products. Limit alcohol. A drink of alcohol is 12 ounces of beer, 5 ounces of wine, or 1½ ounces of liquor. Lose weight, if needed. Being overweight increases your risk of certain health conditions. These include heart disease, high blood pressure, type 2 diabetes, and certain types of cancer. Protect your skin. Do not sunbathe or use tanning beds. Use sunscreen with a SPF 15 or higher.  Apply sunscreen at least 15 minutes before you go outside. Reapply sunscreen every 2 hours. Wear protective clothing, hats, and sunglasses when you are outside. Drive safely. Always wear your seatbelt. Make sure everyone in your car wears a seatbelt. A seatbelt can save your life if you are in an accident. Do not use your cell phone when you are driving. This could distract you and cause an accident. Pull over if you need to make a call or send a text message. Practice safe sex. Use latex condoms if are sexually active and have more than one partner. Your healthcare provider may recommend screening tests for sexually transmitted infections (STIs). Wear helmets, lifejackets, and protective gear. Always wear a helmet when you ride a bike or motorcycle, go skiing, or play sports that could cause a head injury. Wear protective equipment when you play sports. Wear a lifejacket when you are on a boat or doing water sports. © Copyright [facility] 2023 Information is for End User's use only and may not be sold, redistributed or otherwise used for commercial purposes. The above information is an  only. It is not intended as medical advice for individual conditions or treatments. Talk to your doctor, nurse or pharmacist before following any medical regimen to see if it is safe and effective for you.

## 2023-12-18 DIAGNOSIS — F90.0 ATTENTION DEFICIT HYPERACTIVITY DISORDER (ADHD), PREDOMINANTLY INATTENTIVE TYPE: ICD-10-CM

## 2023-12-18 RX ORDER — DEXTROAMPHETAMINE SACCHARATE, AMPHETAMINE ASPARTATE, DEXTROAMPHETAMINE SULFATE AND AMPHETAMINE SULFATE 2.5; 2.5; 2.5; 2.5 MG/1; MG/1; MG/1; MG/1
TABLET ORAL
Qty: 15 TABLET | Refills: 0 | Status: SHIPPED | OUTPATIENT
Start: 2023-12-18

## 2023-12-18 RX ORDER — LISDEXAMFETAMINE DIMESYLATE CAPSULES 40 MG/1
40 CAPSULE ORAL EVERY MORNING
Qty: 30 CAPSULE | Refills: 0 | Status: SHIPPED | OUTPATIENT
Start: 2023-12-18

## 2023-12-21 DIAGNOSIS — F41.9 ANXIETY: ICD-10-CM

## 2023-12-21 RX ORDER — CLONAZEPAM 0.5 MG/1
0.5 TABLET ORAL 2 TIMES DAILY PRN
Qty: 60 TABLET | Refills: 0 | Status: SHIPPED | OUTPATIENT
Start: 2023-12-21

## 2024-01-12 DIAGNOSIS — F33.2 SEVERE EPISODE OF RECURRENT MAJOR DEPRESSIVE DISORDER, WITHOUT PSYCHOTIC FEATURES (HCC): ICD-10-CM

## 2024-01-12 RX ORDER — ESCITALOPRAM OXALATE 10 MG/1
10 TABLET ORAL DAILY
Qty: 90 TABLET | Refills: 2 | Status: SHIPPED | OUTPATIENT
Start: 2024-01-12

## 2024-01-22 ENCOUNTER — TELEPHONE (OUTPATIENT)
Age: 44
End: 2024-01-22

## 2024-01-22 NOTE — TELEPHONE ENCOUNTER
Patient called for refills.      lisdexamfetamine (Vyvanse) 40 MG capsule [[id number]]    Order Details  Dose: 40 mg Route: Oral Frequency: Every morning   Dispense Quantity: 30 capsule Refills: 0          Sig: Take 1 capsule (40 mg total) by mouth every morning Max Daily Amount: 40 mg         amphetamine-dextroamphetamine (ADDERALL, 10MG,) 10 mg tablet [[id number]]    Order Details    Dose, Route, Frequency: As Directed   Dispense Quantity: 15 tablet Refills: 0          Si/2 tablet in the afternoon

## 2024-01-23 DIAGNOSIS — F90.0 ATTENTION DEFICIT HYPERACTIVITY DISORDER (ADHD), PREDOMINANTLY INATTENTIVE TYPE: ICD-10-CM

## 2024-01-23 RX ORDER — LISDEXAMFETAMINE DIMESYLATE CAPSULES 40 MG/1
40 CAPSULE ORAL EVERY MORNING
Qty: 30 CAPSULE | Refills: 0 | Status: SHIPPED | OUTPATIENT
Start: 2024-01-23

## 2024-01-23 RX ORDER — DEXTROAMPHETAMINE SACCHARATE, AMPHETAMINE ASPARTATE, DEXTROAMPHETAMINE SULFATE AND AMPHETAMINE SULFATE 2.5; 2.5; 2.5; 2.5 MG/1; MG/1; MG/1; MG/1
TABLET ORAL
Qty: 15 TABLET | Refills: 0 | Status: SHIPPED | OUTPATIENT
Start: 2024-01-23

## 2024-02-05 DIAGNOSIS — F41.9 ANXIETY: ICD-10-CM

## 2024-02-06 RX ORDER — CLONAZEPAM 0.5 MG/1
0.5 TABLET ORAL 2 TIMES DAILY PRN
Qty: 60 TABLET | Refills: 0 | Status: SHIPPED | OUTPATIENT
Start: 2024-02-06

## 2024-02-21 ENCOUNTER — TELEMEDICINE (OUTPATIENT)
Dept: FAMILY MEDICINE CLINIC | Facility: CLINIC | Age: 44
End: 2024-02-21
Payer: COMMERCIAL

## 2024-02-21 DIAGNOSIS — F41.0 PANIC ATTACKS: ICD-10-CM

## 2024-02-21 DIAGNOSIS — R21 RASH: ICD-10-CM

## 2024-02-21 DIAGNOSIS — F41.9 ANXIETY: ICD-10-CM

## 2024-02-21 DIAGNOSIS — F33.1 MODERATE EPISODE OF RECURRENT MAJOR DEPRESSIVE DISORDER (HCC): Primary | ICD-10-CM

## 2024-02-21 DIAGNOSIS — F90.0 ATTENTION DEFICIT HYPERACTIVITY DISORDER (ADHD), PREDOMINANTLY INATTENTIVE TYPE: ICD-10-CM

## 2024-02-21 PROCEDURE — 99214 OFFICE O/P EST MOD 30 MIN: CPT | Performed by: FAMILY MEDICINE

## 2024-02-21 RX ORDER — DEXTROAMPHETAMINE SACCHARATE, AMPHETAMINE ASPARTATE, DEXTROAMPHETAMINE SULFATE AND AMPHETAMINE SULFATE 2.5; 2.5; 2.5; 2.5 MG/1; MG/1; MG/1; MG/1
TABLET ORAL
Qty: 30 TABLET | Refills: 0 | Status: SHIPPED | OUTPATIENT
Start: 2024-02-21

## 2024-02-21 RX ORDER — LISDEXAMFETAMINE DIMESYLATE CAPSULES 40 MG/1
40 CAPSULE ORAL EVERY MORNING
Qty: 30 CAPSULE | Refills: 0 | Status: SHIPPED | OUTPATIENT
Start: 2024-02-21

## 2024-02-21 RX ORDER — TRIAMCINOLONE ACETONIDE 1 MG/G
OINTMENT TOPICAL 2 TIMES DAILY
Qty: 453.6 G | Refills: 0 | Status: SHIPPED | OUTPATIENT
Start: 2024-02-21

## 2024-02-21 NOTE — PROGRESS NOTES
Virtual Regular Visit    Verification of patient location:    Patient is located at Home in the following state in which I hold an active license [place]      Assessment/Plan:    Problem List Items Addressed This Visit          High    Anxiety    Moderate episode of recurrent major depressive disorder (HCC) - Primary    Relevant Medications    amphetamine-dextroamphetamine (ADDERALL, 10MG,) 10 mg tablet    lisdexamfetamine (Vyvanse) 40 MG capsule    Panic attacks       Medium    Attention deficit hyperactivity disorder (ADHD), predominantly inattentive type    Relevant Medications    amphetamine-dextroamphetamine (ADDERALL, 10MG,) 10 mg tablet    lisdexamfetamine (Vyvanse) 40 MG capsule     Other Visit Diagnoses       Rash        Relevant Medications    triamcinolone (KENALOG) 0.1 % ointment          Patient Instructions   Anxiety and depression are significantly improved.  Continue Lexapro 10 mg daily.  Continue Vyvanse 40 mg daily.  Change Adderall to 10 mg in the afternoon.  Triamcinolone ointment can be applied to scaly areas on the neck.  Other medications stay the same.  Follow-up appointment in 3 months.         Reason for visit is   Chief Complaint   Patient presents with    Virtual Regular Visit          Encounter provider Blas Pisano MD    Provider located at [address] [place] [phone]      Recent Visits  No visits were found meeting these conditions.  Showing recent visits within past 7 days and meeting all other requirements  Today's Visits  Date Type Provider Dept   02/21/24 Telemedicine Blas Pisano MD [facility]   Showing today's visits and meeting all other requirements  Future Appointments  No visits were found meeting these conditions.  Showing future appointments within next 150 days and meeting all other requirements       The patient was identified by name and date of birth. Rekha Sawyer was informed that this is a  telemedicine visit and that the visit is being conducted through the Epic Embedded platform. She agrees to proceed..  My office door was closed. No one else was in the room.  She acknowledged consent and understanding of privacy and security of the video platform. The patient has agreed to participate and understands they can discontinue the visit at any time.    Patient is aware this is a billable service.     Subjective  Rekha Sawyer is a 43 y.o. female seen via video for follow-up of anxiety, depression, and ADHD.  .  At last visit, started on Lexapro.  Also was seeing a counselor.  Lexapro is definitely helping.  States that she is doing good.  Work is going better.  She is able to respond better when perceived slights occur at work.  Daughter Flor will be starting counseling tonight.  Only had 2 panic attacks in the last month which have not been severe as previous.  Having some scaling and itching around her neck.  Applying Aquaphor.  Continues on Vyvanse and Adderall.        Past Medical History:   Diagnosis Date    Anxiety     Attention deficit hyperactivity disorder (ADHD), predominantly inattentive type 07/31/2015    Cystic fibrosis carrier 05/24/2021    Formatting of this note might be different from the original. Partner declined Testing    Essential tremor 05/01/2023    IBS (irritable bowel syndrome)     Panic attacks 10/01/2021    PTSD (post-traumatic stress disorder) 10/17/2022    Raynaud's disease     Recurrent major depressive disorder, in full remission (HCC) 11/05/2018    2 episodes of after hospitalization in same place at the retreat in [place]    Restless leg syndrome 11/05/2018       Past Surgical History:   Procedure Laterality Date    AUGMENTATION BREAST  2013    AUGMENTATION MAMMAPLASTY      2 capsular contractures on left corrected, most recent 2015.    SHOULDER SURGERY Right     Two surgeries to repair recurrent dislocation    WISDOM TOOTH EXTRACTION         Current Outpatient  Medications   Medication Sig Dispense Refill    acyclovir (ZOVIRAX) 400 MG tablet Take 400 mg by mouth daily      albuterol (PROVENTIL HFA,VENTOLIN HFA) 90 mcg/act inhaler  (Patient not taking: Reported on 10/24/2023)      amphetamine-dextroamphetamine (ADDERALL, 10MG,) 10 mg tablet 1/2 tablet in the afternoon 15 tablet 0    Calcium Carb-Cholecalciferol 1000-800 MG-UNIT TABS daily       cetirizine (ZyrTEC) 10 mg tablet Take 10 mg by mouth daily at bedtime      clobetasol (TEMOVATE) 0.05 % ointment Apply topically 2 (two) times a day (Patient not taking: Reported on 10/24/2023) 30 g 2    clonazePAM (KlonoPIN) 0.5 mg tablet TAKE 1 TABLET (0.5 MG TOTAL) BY MOUTH 2 (TWO) TIMES A DAY AS NEEDED FOR ANXIETY. 60 tablet 0    doxycycline hyclate (VIBRA-TABS) 100 mg tablet Take 100 mg by mouth 2 (two) times a day      Clary 0.35 MG tablet       escitalopram (LEXAPRO) 10 mg tablet TAKE 1 TABLET BY MOUTH EVERY DAY 90 tablet 2    fluconazole (DIFLUCAN) 150 mg tablet Take 150 mg by mouth once a week      fluticasone (FLONASE) 50 mcg/act nasal spray 2 sprays into each nostril daily      hydrocortisone 2.5 % ointment       hydrOXYzine HCL (ATARAX) 25 mg tablet Take 1 tablet (25 mg total) by mouth every 6 (six) hours (Patient not taking: Reported on 12/7/2023) 12 tablet 0    ibuprofen (MOTRIN) 600 mg tablet Take 600 mg by mouth every 6 (six) hours as needed      ketoconazole (NIZORAL) 2 % shampoo USE AS A BODY WASH TO THE AFFECTED AREAS 2-3 TIMES A WEEK      lisdexamfetamine (Vyvanse) 40 MG capsule Take 1 capsule (40 mg total) by mouth every morning Max Daily Amount: 40 mg 30 capsule 0    Multiple Vitamins-Minerals (MULTIVITAMIN ADULT PO) Take by mouth daily      mupirocin (BACTROBAN) 2 % ointment Apply topically 3 (three) times a day (Patient not taking: Reported on 10/24/2023) 22 g 5    nystatin (MYCOSTATIN) 500,000 units/5 mL suspension RINSE WITH 5ML BY MOUTH 4 TIMES A DAY      Omega-3 Fatty Acids (FISH OIL) 1,000 mg Take 1,000  mg by mouth daily       propranolol (INDERAL) 20 mg tablet Take 1 tablet (20 mg total) by mouth in the morning 90 tablet 3    triamcinolone (KENALOG) 0.1 % ointment Apply topically 2 (two) times a day For 2 weeks 453.6 g 0    Turmeric 500 MG TABS Take by mouth       No current facility-administered medications for this visit.        Allergies   Allergen Reactions    Cephalexin Nausea Only      With abdominal pain    Other Other (See Comments)    Penicillins Other (See Comments)     unknown    Vancomycin Hives     hives    Penicillins Rash    Wheat Bran - Food Allergy GI Intolerance and Rash       Review of Systems    Video Exam    There were no vitals filed for this visit.    Physical Exam   Appears well.  Mood is significantly brighter.  No signs of anxiety.  Visit Time  Total Visit Duration: 11

## 2024-02-21 NOTE — PATIENT INSTRUCTIONS
Anxiety and depression are significantly improved.  Continue Lexapro 10 mg daily.  Continue Vyvanse 40 mg daily.  Change Adderall to 10 mg in the afternoon.  Triamcinolone ointment can be applied to scaly areas on the neck.  Other medications stay the same.  Follow-up appointment in 3 months.

## 2024-03-12 DIAGNOSIS — F41.9 ANXIETY: ICD-10-CM

## 2024-03-12 RX ORDER — PROPRANOLOL HYDROCHLORIDE 20 MG/1
20 TABLET ORAL DAILY
Qty: 90 TABLET | Refills: 1 | Status: SHIPPED | OUTPATIENT
Start: 2024-03-12

## 2024-03-12 NOTE — TELEPHONE ENCOUNTER
Medication: clonazepam    Dose/Frequency: 0.5 mg; 1 tab BID PRN    Quantity: 60     Pharmacy: [facility]    Office:   [x] PCP/Provider -   [] Speciality/Provider -     Does the patient have enough for 3 days?   [x] Yes   [] No - Send as HP to POD    Medication: propranolol    Dose/Frequency: 20 mg; 1 tab daily    Quantity: 90    Pharmacy: [facility]    Office:   [x] PCP/Provider -   [] Speciality/Provider -     Does the patient have enough for 3 days?   [x] Yes   [] No - Send as HP to POD

## 2024-03-13 RX ORDER — CLONAZEPAM 0.5 MG/1
0.5 TABLET ORAL 2 TIMES DAILY PRN
Qty: 60 TABLET | Refills: 0 | Status: SHIPPED | OUTPATIENT
Start: 2024-03-13

## 2024-03-26 DIAGNOSIS — F90.0 ATTENTION DEFICIT HYPERACTIVITY DISORDER (ADHD), PREDOMINANTLY INATTENTIVE TYPE: ICD-10-CM

## 2024-03-26 RX ORDER — LISDEXAMFETAMINE DIMESYLATE CAPSULES 40 MG/1
40 CAPSULE ORAL EVERY MORNING
Qty: 30 CAPSULE | Refills: 0 | Status: SHIPPED | OUTPATIENT
Start: 2024-03-26

## 2024-03-26 RX ORDER — DEXTROAMPHETAMINE SACCHARATE, AMPHETAMINE ASPARTATE, DEXTROAMPHETAMINE SULFATE AND AMPHETAMINE SULFATE 2.5; 2.5; 2.5; 2.5 MG/1; MG/1; MG/1; MG/1
TABLET ORAL
Qty: 30 TABLET | Refills: 0 | Status: SHIPPED | OUTPATIENT
Start: 2024-03-26

## 2024-04-12 DIAGNOSIS — F41.9 ANXIETY: ICD-10-CM

## 2024-04-12 RX ORDER — CLONAZEPAM 0.5 MG/1
0.5 TABLET ORAL 2 TIMES DAILY PRN
Qty: 60 TABLET | Refills: 1 | Status: SHIPPED | OUTPATIENT
Start: 2024-04-12

## 2024-05-05 ENCOUNTER — APPOINTMENT (EMERGENCY)
Dept: RADIOLOGY | Facility: HOSPITAL | Age: 44
End: 2024-05-05
Payer: COMMERCIAL

## 2024-05-05 ENCOUNTER — HOSPITAL ENCOUNTER (EMERGENCY)
Facility: HOSPITAL | Age: 44
Discharge: HOME | End: 2024-05-05
Attending: EMERGENCY MEDICINE
Payer: COMMERCIAL

## 2024-05-05 VITALS
DIASTOLIC BLOOD PRESSURE: 68 MMHG | HEART RATE: 83 BPM | RESPIRATION RATE: 20 BRPM | OXYGEN SATURATION: 100 % | TEMPERATURE: 97 F | SYSTOLIC BLOOD PRESSURE: 106 MMHG

## 2024-05-05 DIAGNOSIS — R55 SYNCOPE: Primary | ICD-10-CM

## 2024-05-05 DIAGNOSIS — Z87.898 HISTORY OF TREATMENT FOR SUBSTANCE USE DISORDER: ICD-10-CM

## 2024-05-05 DIAGNOSIS — R79.89 ELEVATED LFTS: ICD-10-CM

## 2024-05-05 PROBLEM — M25.562 CHRONIC PAIN OF BOTH KNEES: Status: ACTIVE | Noted: 2023-10-11

## 2024-05-05 PROBLEM — B00.9 HERPES SIMPLEX: Status: ACTIVE | Noted: 2024-05-05

## 2024-05-05 PROBLEM — G89.29 CHRONIC PAIN OF BOTH KNEES: Status: ACTIVE | Noted: 2023-10-11

## 2024-05-05 PROBLEM — I73.00 RAYNAUD'S DISEASE WITHOUT GANGRENE: Status: ACTIVE | Noted: 2023-10-11

## 2024-05-05 PROBLEM — K11.9 SALIVARY GLAND DISORDER: Status: ACTIVE | Noted: 2023-10-11

## 2024-05-05 PROBLEM — M25.561 CHRONIC PAIN OF BOTH KNEES: Status: ACTIVE | Noted: 2023-10-11

## 2024-05-05 LAB
ALBUMIN SERPL BCP-MCNC: 3.5 G/DL (ref 3.5–5)
ALP SERPL-CCNC: 35 U/L (ref 34–104)
ALT SERPL W P-5'-P-CCNC: 73 U/L (ref 7–52)
ANION GAP SERPL CALCULATED.3IONS-SCNC: 4 MMOL/L (ref 4–13)
APTT PPP: 32 SECONDS (ref 23–37)
AST SERPL W P-5'-P-CCNC: 48 U/L (ref 13–39)
BASOPHILS # BLD AUTO: 0.03 THOUSANDS/ÂΜL (ref 0–0.1)
BASOPHILS NFR BLD AUTO: 1 % (ref 0–1)
BILIRUB SERPL-MCNC: 0.4 MG/DL (ref 0.2–1)
BILIRUB UR QL STRIP: NEGATIVE
BUN SERPL-MCNC: 12 MG/DL (ref 5–25)
CALCIUM SERPL-MCNC: 8.8 MG/DL (ref 8.4–10.2)
CARDIAC TROPONIN I PNL SERPL HS: <2 NG/L
CHLORIDE SERPL-SCNC: 107 MMOL/L (ref 96–108)
CLARITY UR: CLEAR
CO2 SERPL-SCNC: 29 MMOL/L (ref 21–32)
COLOR UR: YELLOW
CREAT SERPL-MCNC: 0.75 MG/DL (ref 0.6–1.3)
D DIMER PPP FEU-MCNC: 0.39 UG/ML FEU
EOSINOPHIL # BLD AUTO: 0.18 THOUSAND/ÂΜL (ref 0–0.61)
EOSINOPHIL NFR BLD AUTO: 5 % (ref 0–6)
ERYTHROCYTE [DISTWIDTH] IN BLOOD BY AUTOMATED COUNT: 11.3 % (ref 11.6–15.1)
EXT PREGNANCY TEST URINE: NEGATIVE
EXT. CONTROL: NORMAL
GFR SERPL CREATININE-BSD FRML MDRD: 98 ML/MIN/1.73SQ M
GLUCOSE SERPL-MCNC: 90 MG/DL (ref 65–140)
GLUCOSE UR STRIP-MCNC: NEGATIVE MG/DL
HCT VFR BLD AUTO: 38.4 % (ref 34.8–46.1)
HGB BLD-MCNC: 12.3 G/DL (ref 11.5–15.4)
HGB UR QL STRIP.AUTO: NEGATIVE
IMM GRANULOCYTES # BLD AUTO: 0.01 THOUSAND/UL (ref 0–0.2)
IMM GRANULOCYTES NFR BLD AUTO: 0 % (ref 0–2)
INR PPP: 0.99 (ref 0.84–1.19)
KETONES UR STRIP-MCNC: NEGATIVE MG/DL
LEUKOCYTE ESTERASE UR QL STRIP: NEGATIVE
LYMPHOCYTES # BLD AUTO: 1.17 THOUSANDS/ÂΜL (ref 0.6–4.47)
LYMPHOCYTES NFR BLD AUTO: 35 % (ref 14–44)
MAGNESIUM SERPL-MCNC: 1.9 MG/DL (ref 1.9–2.7)
MCH RBC QN AUTO: 32.4 PG (ref 26.8–34.3)
MCHC RBC AUTO-ENTMCNC: 32 G/DL (ref 31.4–37.4)
MCV RBC AUTO: 101 FL (ref 82–98)
MONOCYTES # BLD AUTO: 0.62 THOUSAND/ÂΜL (ref 0.17–1.22)
MONOCYTES NFR BLD AUTO: 19 % (ref 4–12)
NEUTROPHILS # BLD AUTO: 1.3 THOUSANDS/ÂΜL (ref 1.85–7.62)
NEUTS SEG NFR BLD AUTO: 40 % (ref 43–75)
NITRITE UR QL STRIP: NEGATIVE
NRBC BLD AUTO-RTO: 0 /100 WBCS
PH UR STRIP.AUTO: 6.5 [PH]
PLATELET # BLD AUTO: 173 THOUSANDS/UL (ref 149–390)
PMV BLD AUTO: 10.2 FL (ref 8.9–12.7)
POTASSIUM SERPL-SCNC: 3.8 MMOL/L (ref 3.5–5.3)
PROT SERPL-MCNC: 5.3 G/DL (ref 6.4–8.4)
PROT UR STRIP-MCNC: NEGATIVE MG/DL
PROTHROMBIN TIME: 13 SECONDS (ref 11.6–14.5)
RBC # BLD AUTO: 3.8 MILLION/UL (ref 3.81–5.12)
SODIUM SERPL-SCNC: 140 MMOL/L (ref 135–147)
SP GR UR STRIP.AUTO: 1.02 (ref 1–1.03)
UROBILINOGEN UR STRIP-ACNC: <2 MG/DL
WBC # BLD AUTO: 3.31 THOUSAND/UL (ref 4.31–10.16)

## 2024-05-05 PROCEDURE — 81003 URINALYSIS AUTO W/O SCOPE: CPT | Performed by: PHYSICIAN ASSISTANT

## 2024-05-05 PROCEDURE — 83735 ASSAY OF MAGNESIUM: CPT | Performed by: PHYSICIAN ASSISTANT

## 2024-05-05 PROCEDURE — 96360 HYDRATION IV INFUSION INIT: CPT

## 2024-05-05 PROCEDURE — 85610 PROTHROMBIN TIME: CPT | Performed by: PHYSICIAN ASSISTANT

## 2024-05-05 PROCEDURE — 93005 ELECTROCARDIOGRAM TRACING: CPT

## 2024-05-05 PROCEDURE — 85730 THROMBOPLASTIN TIME PARTIAL: CPT | Performed by: PHYSICIAN ASSISTANT

## 2024-05-05 PROCEDURE — 99284 EMERGENCY DEPT VISIT MOD MDM: CPT

## 2024-05-05 PROCEDURE — 80053 COMPREHEN METABOLIC PANEL: CPT | Performed by: PHYSICIAN ASSISTANT

## 2024-05-05 PROCEDURE — 71045 X-RAY EXAM CHEST 1 VIEW: CPT

## 2024-05-05 PROCEDURE — 85379 FIBRIN DEGRADATION QUANT: CPT | Performed by: PHYSICIAN ASSISTANT

## 2024-05-05 PROCEDURE — 84484 ASSAY OF TROPONIN QUANT: CPT | Performed by: PHYSICIAN ASSISTANT

## 2024-05-05 PROCEDURE — 99285 EMERGENCY DEPT VISIT HI MDM: CPT | Performed by: PHYSICIAN ASSISTANT

## 2024-05-05 PROCEDURE — 81025 URINE PREGNANCY TEST: CPT | Performed by: PHYSICIAN ASSISTANT

## 2024-05-05 PROCEDURE — 85025 COMPLETE CBC W/AUTO DIFF WBC: CPT | Performed by: PHYSICIAN ASSISTANT

## 2024-05-05 PROCEDURE — 36415 COLL VENOUS BLD VENIPUNCTURE: CPT | Performed by: PHYSICIAN ASSISTANT

## 2024-05-05 RX ORDER — METHOCARBAMOL 500 MG/1
500 TABLET, FILM COATED ORAL ONCE
Status: COMPLETED | OUTPATIENT
Start: 2024-05-05 | End: 2024-05-05

## 2024-05-05 RX ADMIN — SODIUM CHLORIDE 1000 ML: 0.9 INJECTION, SOLUTION INTRAVENOUS at 11:19

## 2024-05-05 RX ADMIN — METHOCARBAMOL TABLETS 500 MG: 500 TABLET, COATED ORAL at 11:31

## 2024-05-05 NOTE — DISCHARGE INSTRUCTIONS
Rest, plenty of fluids.  Continue to monitor blood pressure.  Follow up with PCP or return to ER as needed.

## 2024-05-05 NOTE — ED PROVIDER NOTES
History  Chief Complaint   Patient presents with    Syncope     Patient from [facility]. States that she felt dizzy and then passed out. States has a history of syncope, but usually can feel it coming on and sits herself down before she passes out.      43 year old female with PMH anxiety, ADHD, IBS, Raynaud's, depression, RLS, substance use disorder presenting via EMS from local treatment center ([facility]) for evaluation after an syncopal episode.  Pt reports she has been there for a couple weeks for alcohol and opioid use disorder.  She reports she recently came off a taper of suboxone.  She reports she was on clonidine to help with her withdrawal symptoms but states they stopped this as her blood pressure was going too low.  She also notes she has been off her anti-depressant and states they weren't able to get her wellbutrin in from the pharmacy.  She reports today she was getting her medications when she stood up, felt dizzy and lightheaded and then passed out.  She states she has a history of similar events however today she wasn't able to catch her self.  She reports she was wrapped in her fuzzy blanket she is wearing.  She denies any injuries from this episode.  She is not on aspirin or blood thinners.  She reports the dizziness and lightheadedess has felt improved.  She complains of a very minimal headache.  Denies visual disturbance.  Denies chest pain, SOB, palpitations, N/V/D, abdominal pain.  Denies numbness, tingling, weakness.  She is complaining of feeling cold.  Denies cough, congestion or recent illness.  She reports feeling shaky which she contributes to her withdrawal symptoms.  There was no reported seizure activity.  No reports incontinence.  Denies neck or back pain.  She also notes she has had increased swelling around her ankles.  She notes this was better when she slept and elevated her legs.      History provided by:  Patient and medical records   used: No     Syncope  Episode history:  Single  Chronicity:  New  Context: standing up    Witnessed: yes    Relieved by:  Nothing  Worsened by:  Nothing  Associated symptoms: dizziness and headaches    Associated symptoms: no chest pain, no confusion, no diaphoresis, no difficulty breathing, no fever, no focal sensory loss, no focal weakness, no nausea, no palpitations, no recent surgery, no seizures, no shortness of breath, no visual change, no vomiting and no weakness    Risk factors: no coronary artery disease and no seizures        Prior to Admission Medications   Prescriptions Last Dose Informant Patient Reported? Taking?   Calcium Carb-Cholecalciferol 1000-800 MG-UNIT TABS  Self Yes No   Sig: daily    Clary 0.35 MG tablet  Self Yes No   Multiple Vitamins-Minerals (MULTIVITAMIN ADULT PO)  Self Yes No   Sig: Take by mouth daily   Omega-3 Fatty Acids (FISH OIL) 1,000 mg  Self Yes No   Sig: Take 1,000 mg by mouth daily    Turmeric 500 MG TABS  Self Yes No   Sig: Take by mouth   acyclovir (ZOVIRAX) 400 MG tablet   Yes No   Sig: Take 400 mg by mouth daily   albuterol (PROVENTIL HFA,VENTOLIN HFA) 90 mcg/act inhaler  Self Yes No   Patient not taking: Reported on 10/24/2023   amphetamine-dextroamphetamine (ADDERALL, 10MG,) 10 mg tablet   No No   Si tablet in the afternoon   cetirizine (ZyrTEC) 10 mg tablet  Self Yes No   Sig: Take 10 mg by mouth daily at bedtime   clonazePAM (KlonoPIN) 0.5 mg tablet   No No   Sig: Take 1 tablet (0.5 mg total) by mouth 2 (two) times a day as needed for anxiety   doxycycline hyclate (VIBRA-TABS) 100 mg tablet  Self Yes No   Sig: Take 100 mg by mouth 2 (two) times a day   escitalopram (LEXAPRO) 10 mg tablet   No No   Sig: TAKE 1 TABLET BY MOUTH EVERY DAY   fluconazole (DIFLUCAN) 150 mg tablet   Yes No   Sig: Take 150 mg by mouth once a week   fluticasone (FLONASE) 50 mcg/act nasal spray  Self Yes No   Si sprays into each nostril daily   hydrocortisone 2.5 % ointment   Yes No   ibuprofen  (MOTRIN) 600 mg tablet   Yes No   Sig: Take 600 mg by mouth every 6 (six) hours as needed   ketoconazole (NIZORAL) 2 % shampoo   Yes No   Sig: USE AS A BODY WASH TO THE AFFECTED AREAS 2-3 TIMES A WEEK   lisdexamfetamine (Vyvanse) 40 MG capsule   No No   Sig: Take 1 capsule (40 mg total) by mouth every morning Max Daily Amount: 40 mg   nystatin (MYCOSTATIN) 500,000 units/5 mL suspension   Yes No   Sig: RINSE WITH 5ML BY MOUTH 4 TIMES A DAY   propranolol (INDERAL) 20 mg tablet   No No   Sig: Take 1 tablet (20 mg total) by mouth in the morning   triamcinolone (KENALOG) 0.1 % ointment   No No   Sig: Apply topically 2 (two) times a day For 2 weeks      Facility-Administered Medications: None       Past Medical History:   Diagnosis Date    Anxiety     Attention deficit hyperactivity disorder (ADHD), predominantly inattentive type 07/31/2015    Cystic fibrosis carrier 05/24/2021    Formatting of this note might be different from the original. Partner declined Testing    Essential tremor 05/01/2023    IBS (irritable bowel syndrome)     Panic attacks 10/01/2021    PTSD (post-traumatic stress disorder) 10/17/2022    Raynaud's disease     Recurrent major depressive disorder, in full remission (HCC) 11/05/2018    2 episodes of after hospitalization in same place at the retreat in [place]    Restless leg syndrome 11/05/2018       Past Surgical History:   Procedure Laterality Date    AUGMENTATION BREAST  2013    AUGMENTATION MAMMAPLASTY      2 capsular contractures on left corrected, most recent 2015.    SHOULDER SURGERY Right     Two surgeries to repair recurrent dislocation    WISDOM TOOTH EXTRACTION         Family History   Problem Relation Age of Onset    Cirrhosis Father     No Known Problems Mother     Diabetes Father     No Known Problems Sister     No Known Problems Daughter     No Known Problems Maternal Grandmother     No Known Problems Paternal Grandmother     No Known Problems Maternal Aunt     Breast cancer Neg Hx       I have reviewed and agree with the history as documented.    E-Cigarette/Vaping    E-Cigarette Use Never User      E-Cigarette/Vaping Substances    Nicotine Yes     THC No     CBD No     Flavoring Yes     Other No     Unknown No      Social History     Tobacco Use    Smoking status: Never    Smokeless tobacco: Never   Vaping Use    Vaping status: Never Used   Substance Use Topics    Alcohol use: Not Currently    Drug use: Not Currently       Review of Systems   Constitutional:  Positive for chills. Negative for diaphoresis, fatigue and fever.   HENT: Negative.  Negative for congestion, rhinorrhea and sore throat.    Eyes: Negative.  Negative for visual disturbance.   Respiratory: Negative.  Negative for cough, shortness of breath and wheezing.    Cardiovascular:  Positive for syncope. Negative for chest pain, palpitations and leg swelling.   Gastrointestinal: Negative.  Negative for abdominal pain, constipation, diarrhea, nausea and vomiting.   Genitourinary: Negative.  Negative for dysuria, flank pain, frequency and hematuria.   Musculoskeletal: Negative.  Negative for back pain, myalgias and neck pain.   Skin: Negative.  Negative for rash.   Neurological:  Positive for dizziness, syncope and headaches. Negative for focal weakness, seizures, facial asymmetry, speech difficulty, weakness, light-headedness and numbness.   Psychiatric/Behavioral: Negative.  Negative for confusion.    All other systems reviewed and are negative.      Physical Exam  Physical Exam  Vitals and nursing note reviewed.   Constitutional:       General: She is awake. She is not in acute distress.     Appearance: She is well-developed. She is not toxic-appearing or diaphoretic.   HENT:      Head: Normocephalic and atraumatic.      Right Ear: Hearing and external ear normal.      Left Ear: Hearing and external ear normal.      Nose: Nose normal.      Mouth/Throat:      Mouth: Mucous membranes are moist.      Tongue: Tongue does not deviate  from midline.      Pharynx: Oropharynx is clear. Uvula midline. No oropharyngeal exudate.   Eyes:      General: Lids are normal. Gaze aligned appropriately. No visual field deficit or scleral icterus.     Extraocular Movements: Extraocular movements intact.      Conjunctiva/sclera: Conjunctivae normal.      Pupils: Pupils are equal, round, and reactive to light.   Neck:      Trachea: Trachea and phonation normal. No tracheal deviation.   Cardiovascular:      Rate and Rhythm: Normal rate and regular rhythm.      Pulses: Normal pulses.           Radial pulses are 2+ on the right side and 2+ on the left side.        Dorsalis pedis pulses are 2+ on the right side and 2+ on the left side.        Posterior tibial pulses are 2+ on the right side and 2+ on the left side.      Heart sounds: Normal heart sounds, S1 normal and S2 normal. No murmur heard.     Comments: There appears to be some mild swelling around the ankles otherwise no pretibial edema. No calf tenderness.  Pulmonary:      Effort: Pulmonary effort is normal. No tachypnea or respiratory distress.      Breath sounds: Normal breath sounds. No wheezing, rhonchi or rales.   Abdominal:      General: Bowel sounds are normal. There is no distension.      Palpations: Abdomen is soft.      Tenderness: There is no abdominal tenderness. There is no guarding or rebound.   Musculoskeletal:         General: No tenderness. Normal range of motion.      Cervical back: Normal range of motion and neck supple. No pain with movement or spinous process tenderness.      Right lower leg: No edema.      Left lower leg: No edema.   Skin:     General: Skin is warm and dry.      Capillary Refill: Capillary refill takes less than 2 seconds.      Findings: No rash.   Neurological:      General: No focal deficit present.      Mental Status: She is alert and oriented to person, place, and time.      GCS: GCS eye subscore is 4. GCS verbal subscore is 5. GCS motor subscore is 6.      Cranial  Nerves: Cranial nerves 2-12 are intact. No cranial nerve deficit, dysarthria or facial asymmetry.      Sensory: Sensation is intact. No sensory deficit.      Motor: Motor function is intact. No weakness, tremor or abnormal muscle tone.      Coordination: Coordination is intact.   Psychiatric:         Mood and Affect: Mood normal.         Speech: Speech normal.         Behavior: Behavior normal. Behavior is cooperative.         Vital Signs  ED Triage Vitals [05/05/24 1102]   Temperature Pulse Respirations Blood Pressure SpO2   (!) 97 °F (36.1 °C) 75 18 104/56 99 %      Temp Source Heart Rate Source Patient Position - Orthostatic VS BP Location FiO2 (%)   Temporal Monitor Sitting Right arm --      Pain Score       3           Vitals:    05/05/24 1102 05/05/24 1140 05/05/24 1141 05/05/24 1143   BP: 104/56 95/66 101/69 106/68   Pulse: 75 66 74 83   Patient Position - Orthostatic VS: Sitting Lying - Orthostatic VS Sitting - Orthostatic VS Standing - Orthostatic VS         Visual Acuity      ED Medications  Medications   sodium chloride 0.9 % bolus 1,000 mL (1,000 mL Intravenous New Bag 5/5/24 1119)   methocarbamol (ROBAXIN) tablet 500 mg (500 mg Oral Given 5/5/24 1131)       Diagnostic Studies  Results Reviewed       Procedure Component Value Units Date/Time    HS Troponin I 4hr [[id number]]     Lab Status: No result Specimen: Blood     HS Troponin 0hr (reflex protocol) [[id number]]  (Normal) Collected: 05/05/24 1119    Lab Status: Final result Specimen: Blood from Arm, Right Updated: 05/05/24 1159     hs TnI 0hr <2 ng/L     HS Troponin I 2hr [[id number]]     Lab Status: No result Specimen: Blood     Protime-INR [[id number]]  (Normal) Collected: 05/05/24 1119    Lab Status: Final result Specimen: Blood from Arm, Right Updated: 05/05/24 1156     Protime 13.0 seconds      INR 0.99    APTT [[id number]]  (Normal) Collected: 05/05/24 1119    Lab Status: Final result Specimen: Blood from Arm, Right Updated: 05/05/24 1156     PTT 32  seconds     Comprehensive metabolic panel [[id number]]  (Abnormal) Collected: 05/05/24 1119    Lab Status: Final result Specimen: Blood from Arm, Right Updated: 05/05/24 1153     Sodium 140 mmol/L      Potassium 3.8 mmol/L      Chloride 107 mmol/L      CO2 29 mmol/L      ANION GAP 4 mmol/L      BUN 12 mg/dL      Creatinine 0.75 mg/dL      Glucose 90 mg/dL      Calcium 8.8 mg/dL      AST 48 U/L      ALT 73 U/L      Alkaline Phosphatase 35 U/L      Total Protein 5.3 g/dL      Albumin 3.5 g/dL      Total Bilirubin 0.40 mg/dL      eGFR 98 ml/min/1.73sq m     Narrative:      National Kidney Disease Foundation guidelines for Chronic Kidney Disease (CKD):     Stage 1 with normal or high GFR (GFR > 90 mL/min/1.73 square meters)    Stage 2 Mild CKD (GFR = 60-89 mL/min/1.73 square meters)    Stage 3A Moderate CKD (GFR = 45-59 mL/min/1.73 square meters)    Stage 3B Moderate CKD (GFR = 30-44 mL/min/1.73 square meters)    Stage 4 Severe CKD (GFR = 15-29 mL/min/1.73 square meters)    Stage 5 End Stage CKD (GFR <15 mL/min/1.73 square meters)  Note: GFR calculation is accurate only with a steady state creatinine    Magnesium [[id number]]  (Normal) Collected: 05/05/24 1119    Lab Status: Final result Specimen: Blood from Arm, Right Updated: 05/05/24 1153     Magnesium 1.9 mg/dL     D-Dimer [[id number]]  (Normal) Collected: 05/05/24 1119    Lab Status: Final result Specimen: Blood from Arm, Right Updated: 05/05/24 1148     D-Dimer, Quant 0.39 ug/ml FEU     UA w Reflex to Microscopic w Reflex to Culture [[id number]] Collected: 05/05/24 1124    Lab Status: Final result Specimen: Urine, Clean Catch Updated: 05/05/24 1138     Color, UA Yellow     Clarity, UA Clear     Specific Gravity, UA 1.025     pH, UA 6.5     Leukocytes, UA Negative     Nitrite, UA Negative     Protein, UA Negative mg/dl      Glucose, UA Negative mg/dl      Ketones, UA Negative mg/dl      Urobilinogen, UA <2.0 mg/dl      Bilirubin, UA Negative     Occult Blood, UA  Negative    CBC and differential [[id number]]  (Abnormal) Collected: 05/05/24 1119    Lab Status: Final result Specimen: Blood from Arm, Right Updated: 05/05/24 1138     WBC 3.31 Thousand/uL      RBC 3.80 Million/uL      Hemoglobin 12.3 g/dL      Hematocrit 38.4 %       fL      MCH 32.4 pg      MCHC 32.0 g/dL      RDW 11.3 %      MPV 10.2 fL      Platelets 173 Thousands/uL      nRBC 0 /100 WBCs      Segmented % 40 %      Immature Grans % 0 %      Lymphocytes % 35 %      Monocytes % 19 %      Eosinophils Relative 5 %      Basophils Relative 1 %      Absolute Neutrophils 1.30 Thousands/µL      Absolute Immature Grans 0.01 Thousand/uL      Absolute Lymphocytes 1.17 Thousands/µL      Absolute Monocytes 0.62 Thousand/µL      Eosinophils Absolute 0.18 Thousand/µL      Basophils Absolute 0.03 Thousands/µL     POCT pregnancy, urine [[id number]]  (Normal) Resulted: 05/05/24 1128    Lab Status: Final result Updated: 05/05/24 1128     EXT Preg Test, Ur Negative     Control Valid                   XR chest 1 view portable    (Results Pending)              Procedures  ECG 12 Lead Documentation Only    Date/Time: 5/5/2024 11:10 AM    Performed by: Awilda Adrian PA-C  Authorized by: Awilda Adrian PA-C    Indications / Diagnosis:  Syncope  ECG reviewed by me, the ED Provider: yes    Patient location:  ED  Previous ECG:     Previous ECG:  Compared to current    Comparison ECG info:  8/18/21    Similarity:  No change    Comparison to cardiac monitor: Yes    Interpretation:     Interpretation: non-specific    Rate:     ECG rate:  68    ECG rate assessment: normal    Rhythm:     Rhythm: sinus rhythm    Ectopy:     Ectopy: none    QRS:     QRS axis:  Normal    QRS intervals:  Normal  Conduction:     Conduction: normal    ST segments:     ST segments:  Normal  T waves:     T waves: non-specific    Comments:      , QRS 66, QT//416; no acute ischemic changes, no significant interval change from prior.            ED Course  ED Course as of 05/05/24 1228   Sun May 05, 2024   1132 PREGNANCY TEST URINE: Negative   1141 WBC(!): 3.31  No recent for comparison but this appears decreased from prior   1142 Hemoglobin: 12.3   1142 Platelet Count: 173   1142 UA w Reflex to Microscopic w Reflex to Culture  Unremarkable; UA not suggestive of infection   1145 XR chest 1 view portable  Independently viewed and interpreted by me - no acute cardiopulmonary process, no significant interval change from prior; pending official read.   1149 D-Dimer, Quant: 0.39  Negative, wells' low risk, doubt PE   1212 GLUCOSE: 90   1212 Creatinine: 0.75   1212 BUN: 12   1212 Sodium: 140   1212 Potassium: 3.8   1212 Chloride: 107   1212 Carbon Dioxide: 29   1212 ANION GAP: 4   1212 Calcium: 8.8   1212 AST(!): 48   1212 ALT(!): 73   1212 ALK PHOS: 35   1212 Total Protein(!): 5.3   1212 Albumin: 3.5   1212 Total Bilirubin: 0.40   1212 GFR, Calculated: 98   1212 MAGNESIUM: 1.9   1212 POCT INR: 0.99   1212 PROTIME: 13.0   1212 PTT: 32   1212 hs TnI 0hr: <2  Not c/w ACS   1212 Mild elevation in LFTs is new from two months ago   1213 Pt reassessed.  Feeling improved.  I suspect this was orthostatic hypotension that caused her to have a syncopal. Plan to discharge back to [facility], pt comfortable with plan.               HEART Risk Score      Flowsheet Row Most Recent Value   Heart Score Risk Calculator    History 0 Filed at: 05/05/2024 1127   ECG 0 Filed at: 05/05/2024 1127   Age 0 Filed at: 05/05/2024 1127   Risk Factors 0 Filed at: 05/05/2024 1127   Troponin 0 Filed at: 05/05/2024 1127   HEART Score 0 Filed at: 05/05/2024 1127                          SBIRT 22yo+      Flowsheet Row Most Recent Value   Initial Alcohol Screen: US AUDIT-C     1. How often do you have a drink containing alcohol? 0 Filed at: 05/05/2024 1120   2. How many drinks containing alcohol do you have on a typical day you are drinking?  0 Filed at: 05/05/2024 1120   3b. FEMALE Any  Age, or MALE 65+: How often do you have 4 or more drinks on one occassion? 0 Filed at: 05/05/2024 1120   Audit-C Score 0 Filed at: 05/05/2024 1120   JONN: How many times in the past year have you...    Used an illegal drug or used a prescription medication for non-medical reasons? Never Filed at: 05/05/2024 1120            Wells' Criteria for PE      Flowsheet Row Most Recent Value   Wells' Criteria for PE    Clinical signs and symptoms of DVT 0 Filed at: 05/05/2024 1127   PE is primary diagnosis or equally likely 0 Filed at: 05/05/2024 1127   HR >100 0 Filed at: 05/05/2024 1127   Immobilization at least 3 days or Surgery in the previous 4 weeks 0 Filed at: 05/05/2024 1127   Previous, objectively diagnosed PE or DVT 0 Filed at: 05/05/2024 1127   Hemoptysis 0 Filed at: 05/05/2024 1127   Malignancy with treatment within 6 months or palliative 0 Filed at: 05/05/2024 1127   Wells' Criteria Total 0 Filed at: 05/05/2024 1127                  Medical Decision Making  44 yo female presenting for evaluation after a syncopal episode.  This appears orthostatic in nature.  Will check EKG, CXR, labs, UA.  Will obtain orthostatic vital signs.  Will provide fluids and symptom management.  Will monitor on telemetry for arrhythmias.  There is no noted traumatic findings associated with this syncopal episode.  Pt has non focal neuro exam.    Work up obtained as noted above.  EKG and troponin not c/w ACS.  No noted arrhythmias on telemetry monitoring.  D dimer negative, doubt PE. CXR does not reveal pneumonia, pneumothorax, vascular congestion or pleural effusion.  No noted leukocytosis, no anemia.  No infectious concerns.  No hypo or hyperglycemia.  Renal function within normal limits.  Mild elevation in LFTs which will require outpatient follow up.  ? Due to EtOH abuse, ? Medication induced.  Electrolytes within normal limits.  UA not suggestive of infection.  Symptomatically pt felt improved.  BP at lower end of normal however  remained stable.  This could be related to the anti-hypertensives she takes and should continue to monitor.  History does not appear consistent with seizures.  Plan for discharge back to [facility] for continued monitoring and treatment.  Pt comfortable with plan of care.    Reviewed symptomatic management.  OTC meds reviewed.  Anticipatory guidance.  Advised recheck with PCP or return to ER as needed.  Strict return precautions outlined.  Patient voiced understanding and had no further questions.    Please refer to above ER course for further details/discussion.    Problems Addressed:  Elevated LFTs: acute illness or injury     Details: Recommended outpatient follow up for recheck.    Syncope: acute illness or injury    Amount and/or Complexity of Data Reviewed  External Data Reviewed: labs, ECG and notes.  Labs: ordered. Decision-making details documented in ED Course.  Radiology: ordered and independent interpretation performed. Decision-making details documented in ED Course.  ECG/medicine tests: ordered and independent interpretation performed. Decision-making details documented in ED Course.    Risk  OTC drugs.  Prescription drug management.             Disposition  Final diagnoses:   Syncope   Elevated LFTs   History of treatment for substance use disorder     Time reflects when diagnosis was documented in both MDM as applicable and the Disposition within this note       Time User Action Codes Description Comment    5/5/2024 12:17 PM Awilda Adrian [R55] Syncope     5/5/2024 12:17 PM Awilda Adrian [R79.89] Elevated LFTs     5/5/2024 12:17 PM Awilda Adrian [Z87.898] History of treatment for substance use disorder           ED Disposition       ED Disposition   Discharge    Condition   Stable    Date/Time   Sun May 5, 2024 1217    Comment   Rekha Sawyer discharge to home/self care.                   Follow-up Information       Follow up With Specialties Details Why Contact Info  Additional Information    Blas Pisano MD Family Medicine Schedule an appointment as soon as possible for a visit   [facility] [facility] [address] [phone]       [facility] Emergency Department Emergency Medicine  As needed [address] [facility] [id number]  [phone] [facility] Emergency Department, [address], [place], [address]            Patient's Medications   Discharge Prescriptions    No medications on file       No discharge procedures on file.    PDMP Review         Value Time User    PDMP Reviewed  Yes 3/26/2024  4:10 PM Blas Pisano MD            ED Provider  Electronically Signed by             Awilda Adrian PA-C  05/05/24 1650

## 2024-05-06 LAB
ATRIAL RATE: 68 BPM
P AXIS: 85 DEGREES
PR INTERVAL: 170 MS
QRS AXIS: 80 DEGREES
QRSD INTERVAL: 66 MS
QT INTERVAL: 392 MS
QTC INTERVAL: 416 MS
T WAVE AXIS: 79 DEGREES
VENTRICULAR RATE: 68 BPM

## 2024-05-06 PROCEDURE — 93010 ELECTROCARDIOGRAM REPORT: CPT | Performed by: INTERNAL MEDICINE

## 2025-02-10 PROBLEM — H02.835 DERMATOCHALASIS OF UPPER AND LOWER EYELIDS OF BOTH EYES: Status: ACTIVE | Noted: 2025-02-10

## 2025-02-10 PROBLEM — H02.834 DERMATOCHALASIS OF UPPER AND LOWER EYELIDS OF BOTH EYES: Status: ACTIVE | Noted: 2025-02-10

## 2025-02-10 PROBLEM — H02.832 DERMATOCHALASIS OF UPPER AND LOWER EYELIDS OF BOTH EYES: Status: ACTIVE | Noted: 2025-02-10

## 2025-02-10 PROBLEM — H02.831 DERMATOCHALASIS OF UPPER AND LOWER EYELIDS OF BOTH EYES: Status: ACTIVE | Noted: 2025-02-10

## 2025-02-13 RX ORDER — BUPROPION HYDROCHLORIDE 300 MG/1
300 TABLET ORAL EVERY MORNING
COMMUNITY

## 2025-02-13 NOTE — PRE-PROCEDURE INSTRUCTIONS
Pre-Surgery Instructions:   Medication Instructions    buPROPion (WELLBUTRIN XL) 300 mg 24 hr tablet Take day of surgery.    clonazePAM (KlonoPIN) 0.5 mg tablet Uses PRN- OK to take day of surgery    ibuprofen (MOTRIN) 600 mg tablet Stop taking this medication at least 3 days prior to surgery/procedure    Multiple Vitamins-Minerals (MULTIVITAMIN ADULT PO) Stop taking 7 days prior to surgery.    Omega-3 Fatty Acids (FISH OIL) 1,000 mg Stop taking 7 days prior to surgery.    propranolol (INDERAL) 20 mg tablet Take day of surgery.    Turmeric 500 MG TABS Stop taking 7 days prior to surgery.   Medication instructions for day surgery reviewed. Please use only a sip of water to take your instructed medications. Avoid all over the counter vitamins, supplements and NSAIDS for one week prior to surgery per anesthesia guidelines. Tylenol is ok to take as needed.     You will receive a call one business day prior to surgery with an arrival time and hospital directions. If your surgery is scheduled on a Monday, the hospital will be calling you on the Friday prior to your surgery. If you have not heard from anyone by 8pm, please call the hospital supervisor through the hospital  at [phone]. ([place] [phone] or [place] [phone]).    Do not eat or drink anything after midnight the night before your surgery, including candy, mints, lifesavers, or chewing gum. Do not drink alcohol 24hrs before your surgery. Try not to smoke at least 24hrs before your surgery.       Follow the pre surgery showering instructions as listed in the “My Surgical Experience Booklet” or otherwise provided by your surgeon's office. Do not use a blade to shave the surgical area 1 week before surgery. It is okay to use a clean electric clippers up to 24 hours before surgery. Do not apply any lotions, creams, including makeup, cologne, deodorant, or perfumes after showering on the day of your surgery. Do not use dry shampoo, hair spray,  hair gel, or any type of hair products.     No contact lenses, eye make-up, or artificial eyelashes. Remove nail polish, including gel polish, and any artificial, gel, or acrylic nails if possible. Remove all jewelry including rings and body piercing jewelry.     Wear causal clothing that is easy to take on and off. Consider your type of surgery.    Keep any valuables, jewelry, piercings at home. Please bring any specially ordered equipment (sling, braces) if indicated.    Arrange for a responsible person to drive you to and from the hospital on the day of your surgery. Please confirm the visitor policy for the day of your procedure when you receive your phone call with an arrival time.     Call the surgeon's office with any new illnesses, exposures, or additional questions prior to surgery.    Please reference your “My Surgical Experience Booklet” for additional information to prepare for your upcoming surgery.

## 2025-02-16 ENCOUNTER — ANESTHESIA EVENT (OUTPATIENT)
Dept: PERIOP | Facility: HOSPITAL | Age: 45
End: 2025-02-16
Payer: SELF-PAY

## 2025-02-16 NOTE — ANESTHESIA PREPROCEDURE EVALUATION
Procedure:  BLEPHAROPLASTY UPPER LID (Bilateral: Eye)  BLEPHAROPLASTY LOWER LID (Bilateral: Eye)    Relevant Problems   CARDIO   (+) Raynaud's disease without gangrene   (+) Varicose veins of right lower extremity with pain      MUSCULOSKELETAL   (+) Cervical myofascial pain syndrome      NEURO/PSYCH   (+) Anxiety   (+) Cervical myofascial pain syndrome   (+) Moderate episode of recurrent major depressive disorder (HCC)   (+) PTSD (post-traumatic stress disorder)   (+) Panic attacks   (+) Severe episode of recurrent major depressive disorder, without psychotic features (HCC)      Behavioral Health   (+) Attention deficit hyperactivity disorder (ADHD), predominantly inattentive type      Neurology/Sleep   (+) Essential tremor   (+) Restless leg syndrome      Eye   (+) Dermatochalasis of upper and lower eyelids of both eyes      Other   (+) Raynaud's phenomenon without gangrene        Physical Exam    Airway    Mallampati score: II  TM Distance: >3 FB  Neck ROM: full     Dental   No notable dental hx upper dentures    Cardiovascular  Cardiovascular exam normal    Pulmonary  Pulmonary exam normal     Other Findings  post-pubertal.      Anesthesia Plan  ASA Score- 3     Anesthesia Type- general with ASA Monitors.         Additional Monitors:     Airway Plan: ETT and LMA.           Plan Factors-Exercise tolerance (METS): >4 METS.    Chart reviewed. EKG reviewed.  Existing labs reviewed. Patient summary reviewed.    Patient is not a current smoker. Patient not instructed to abstain from smoking on day of procedure. Patient did not smoke on day of surgery.            Induction- intravenous.    Postoperative Plan- Plan for postoperative opioid use.     Perioperative Resuscitation Plan - Level 1 - Full Code.       Informed Consent- Anesthetic plan and risks discussed with patient.  I personally reviewed this patient with the CRNA. Discussed and agreed on the Anesthesia Plan with the CRNA..      NPO Status:  No vitals data  found for the desired time range.        Lab Results   Component Value Date    HGBA1C 5.3 10/21/2024       Lab Results   Component Value Date    K 4.2 02/12/2025     02/12/2025    CO2 28 02/12/2025    BUN 16 02/12/2025    CREATININE 0.94 02/12/2025    GLUF 77 05/05/2022    CALCIUM 9.4 02/12/2025    AST 19 10/21/2024    ALT 12 10/21/2024    ALKPHOS 30 (L) 10/21/2024    EGFR 77 02/12/2025       Lab Results   Component Value Date    WBC 3.31 (L) 05/05/2024    HGB 12.3 05/05/2024    HCT 38.4 05/05/2024     (H) 05/05/2024     05/05/2024     Normal sinus rhythm     Confirmed by Raquel Pham ([id number]) on 5/6/2024 10:23:16 AM     Specimen Collected: 05/05/24 11:07

## 2025-02-17 ENCOUNTER — ANESTHESIA (OUTPATIENT)
Dept: PERIOP | Facility: HOSPITAL | Age: 45
End: 2025-02-17
Payer: SELF-PAY

## 2025-02-17 ENCOUNTER — HOSPITAL ENCOUNTER (OUTPATIENT)
Facility: HOSPITAL | Age: 45
Setting detail: OUTPATIENT SURGERY
Discharge: HOME | End: 2025-02-17
Attending: SURGERY | Admitting: SURGERY
Payer: SELF-PAY

## 2025-02-17 VITALS
WEIGHT: 130 LBS | BODY MASS INDEX: 20.4 KG/M2 | HEART RATE: 82 BPM | SYSTOLIC BLOOD PRESSURE: 108 MMHG | DIASTOLIC BLOOD PRESSURE: 77 MMHG | OXYGEN SATURATION: 96 % | TEMPERATURE: 98.1 F | RESPIRATION RATE: 16 BRPM | HEIGHT: 67 IN

## 2025-02-17 LAB
EXT PREGNANCY TEST URINE: NEGATIVE
EXT. CONTROL: NORMAL

## 2025-02-17 PROCEDURE — 81025 URINE PREGNANCY TEST: CPT | Performed by: SURGERY

## 2025-02-17 RX ORDER — HYDROCODONE BITARTRATE AND ACETAMINOPHEN 5; 325 MG/1; MG/1
1 TABLET ORAL EVERY 4 HOURS PRN
Refills: 0 | Status: DISCONTINUED | OUTPATIENT
Start: 2025-02-17 | End: 2025-02-17 | Stop reason: HOSPADM

## 2025-02-17 RX ORDER — NEOMYCIN SULFATE, POLYMYXIN B SULFATE, AND DEXAMETHASONE 3.5; 10000; 1 MG/G; [USP'U]/G; MG/G
OINTMENT OPHTHALMIC AS NEEDED
Status: DISCONTINUED | OUTPATIENT
Start: 2025-02-17 | End: 2025-02-17 | Stop reason: HOSPADM

## 2025-02-17 RX ORDER — SODIUM CHLORIDE, SODIUM LACTATE, POTASSIUM CHLORIDE, CALCIUM CHLORIDE 600; 310; 30; 20 MG/100ML; MG/100ML; MG/100ML; MG/100ML
INJECTION, SOLUTION INTRAVENOUS CONTINUOUS PRN
Status: DISCONTINUED | OUTPATIENT
Start: 2025-02-17 | End: 2025-02-17

## 2025-02-17 RX ORDER — EPHEDRINE SULFATE 50 MG/ML
INJECTION INTRAVENOUS AS NEEDED
Status: DISCONTINUED | OUTPATIENT
Start: 2025-02-17 | End: 2025-02-17

## 2025-02-17 RX ORDER — CEFAZOLIN SODIUM 2 G/50ML
2000 SOLUTION INTRAVENOUS ONCE
Status: COMPLETED | OUTPATIENT
Start: 2025-02-17 | End: 2025-02-17

## 2025-02-17 RX ORDER — MINERAL OIL AND PETROLATUM 150; 830 MG/G; MG/G
OINTMENT OPHTHALMIC AS NEEDED
Status: DISCONTINUED | OUTPATIENT
Start: 2025-02-17 | End: 2025-02-17 | Stop reason: HOSPADM

## 2025-02-17 RX ORDER — ONDANSETRON 2 MG/ML
INJECTION INTRAMUSCULAR; INTRAVENOUS AS NEEDED
Status: DISCONTINUED | OUTPATIENT
Start: 2025-02-17 | End: 2025-02-17

## 2025-02-17 RX ORDER — HYDROCODONE BITARTRATE AND ACETAMINOPHEN 5; 325 MG/1; MG/1
2 TABLET ORAL EVERY 4 HOURS PRN
Refills: 0 | Status: DISCONTINUED | OUTPATIENT
Start: 2025-02-17 | End: 2025-02-17 | Stop reason: HOSPADM

## 2025-02-17 RX ORDER — FENTANYL CITRATE/PF 50 MCG/ML
50 SYRINGE (ML) INJECTION
Status: DISCONTINUED | OUTPATIENT
Start: 2025-02-17 | End: 2025-02-17 | Stop reason: HOSPADM

## 2025-02-17 RX ORDER — PROGESTERONE 100 MG/1
1 CAPSULE ORAL
COMMUNITY
Start: 2024-12-06

## 2025-02-17 RX ORDER — FENTANYL CITRATE 50 UG/ML
INJECTION, SOLUTION INTRAMUSCULAR; INTRAVENOUS AS NEEDED
Status: DISCONTINUED | OUTPATIENT
Start: 2025-02-17 | End: 2025-02-17

## 2025-02-17 RX ORDER — PROPOFOL 10 MG/ML
INJECTION, EMULSION INTRAVENOUS AS NEEDED
Status: DISCONTINUED | OUTPATIENT
Start: 2025-02-17 | End: 2025-02-17

## 2025-02-17 RX ORDER — LIDOCAINE HYDROCHLORIDE AND EPINEPHRINE 10; 10 MG/ML; UG/ML
INJECTION, SOLUTION INFILTRATION; PERINEURAL AS NEEDED
Status: DISCONTINUED | OUTPATIENT
Start: 2025-02-17 | End: 2025-02-17 | Stop reason: HOSPADM

## 2025-02-17 RX ORDER — PHENYLEPHRINE HCL IN 0.9% NACL 1 MG/10 ML
SYRINGE (ML) INTRAVENOUS AS NEEDED
Status: DISCONTINUED | OUTPATIENT
Start: 2025-02-17 | End: 2025-02-17

## 2025-02-17 RX ORDER — SODIUM CHLORIDE, SODIUM LACTATE, POTASSIUM CHLORIDE, CALCIUM CHLORIDE 600; 310; 30; 20 MG/100ML; MG/100ML; MG/100ML; MG/100ML
50 INJECTION, SOLUTION INTRAVENOUS CONTINUOUS
Status: DISCONTINUED | OUTPATIENT
Start: 2025-02-17 | End: 2025-02-17 | Stop reason: HOSPADM

## 2025-02-17 RX ORDER — ONDANSETRON 2 MG/ML
4 INJECTION INTRAMUSCULAR; INTRAVENOUS ONCE AS NEEDED
Status: DISCONTINUED | OUTPATIENT
Start: 2025-02-17 | End: 2025-02-17 | Stop reason: HOSPADM

## 2025-02-17 RX ORDER — LIDOCAINE HYDROCHLORIDE 10 MG/ML
INJECTION, SOLUTION EPIDURAL; INFILTRATION; INTRACAUDAL; PERINEURAL AS NEEDED
Status: DISCONTINUED | OUTPATIENT
Start: 2025-02-17 | End: 2025-02-17

## 2025-02-17 RX ORDER — MIDAZOLAM HYDROCHLORIDE 2 MG/2ML
INJECTION, SOLUTION INTRAMUSCULAR; INTRAVENOUS AS NEEDED
Status: DISCONTINUED | OUTPATIENT
Start: 2025-02-17 | End: 2025-02-17

## 2025-02-17 RX ORDER — DEXAMETHASONE SODIUM PHOSPHATE 10 MG/ML
INJECTION, SOLUTION INTRAMUSCULAR; INTRAVENOUS AS NEEDED
Status: DISCONTINUED | OUTPATIENT
Start: 2025-02-17 | End: 2025-02-17

## 2025-02-17 RX ADMIN — PROPOFOL 200 MG: 10 INJECTION, EMULSION INTRAVENOUS at 12:03

## 2025-02-17 RX ADMIN — SUGAMMADEX 200 MG: 100 INJECTION, SOLUTION INTRAVENOUS at 13:58

## 2025-02-17 RX ADMIN — MIDAZOLAM 2 MG: 1 INJECTION INTRAMUSCULAR; INTRAVENOUS at 11:54

## 2025-02-17 RX ADMIN — DEXAMETHASONE SODIUM PHOSPHATE 10 MG: 10 INJECTION, SOLUTION INTRAMUSCULAR; INTRAVENOUS at 12:07

## 2025-02-17 RX ADMIN — CEFAZOLIN SODIUM 2000 MG: 2 SOLUTION INTRAVENOUS at 12:07

## 2025-02-17 RX ADMIN — HYDROCODONE BITARTRATE AND ACETAMINOPHEN 1 TABLET: 5; 325 TABLET ORAL at 15:09

## 2025-02-17 RX ADMIN — SODIUM CHLORIDE, SODIUM LACTATE, POTASSIUM CHLORIDE, AND CALCIUM CHLORIDE: .6; .31; .03; .02 INJECTION, SOLUTION INTRAVENOUS at 11:42

## 2025-02-17 RX ADMIN — FENTANYL CITRATE 50 MCG: 50 INJECTION INTRAMUSCULAR; INTRAVENOUS at 12:53

## 2025-02-17 RX ADMIN — LIDOCAINE HYDROCHLORIDE 50 MG: 10 SOLUTION INTRAVENOUS at 12:03

## 2025-02-17 RX ADMIN — Medication 100 MCG: at 12:01

## 2025-02-17 RX ADMIN — ONDANSETRON 4 MG: 2 INJECTION INTRAMUSCULAR; INTRAVENOUS at 11:54

## 2025-02-17 RX ADMIN — FENTANYL CITRATE 50 MCG: 50 INJECTION INTRAMUSCULAR; INTRAVENOUS at 12:03

## 2025-02-17 RX ADMIN — EPHEDRINE SULFATE 10 MG: 50 INJECTION INTRAVENOUS at 12:18

## 2025-02-17 NOTE — OP NOTE
OPERATIVE REPORT  PATIENT NAME: Rekha Sawyer    :  1980  MRN: [id number]  Pt Location:  OR ROOM 11    SURGERY DATE: 2025    Surgeons and Role:     * Cuong Brambila MD - Primary     * Viv Ochoa - Assisting    Preop Diagnosis:  Blepharochalasis of upper and lower eyelids of both eyes [H02.31, H02.32, H02.35, H02.34]    Post-Op Diagnosis Codes:     * Blepharochalasis of upper and lower eyelids of both eyes [H02.31, H02.32, H02.35, H02.34]    Procedure(s):  Bilateral - BLEPHAROPLASTY UPPER LID  Bilateral - BLEPHAROPLASTY LOWER LID    Specimen(s):  * No specimens in log *    Estimated Blood Loss:   Minimal    Drains:  * No LDAs found *    Anesthesia Type:   IV Sedation with Anesthesia    Operative Indications:  Blepharochalasis of upper and lower eyelids of both eyes [H02.31, H02.32, H02.35, H02.34]       Operative Findings:  none      Complications:   None    Procedure and Technique:  The patient was properly identified in the operating room and placed    in the supine position on the bed. She was prepped and draped in the    sterile surgical fashion.      We first started by marking upper  eyes with a marking pen and then    injected skin with 1% lidocaine with epinephrine. At this point, we then   went ahead and excised the skinwith a #15 blade and electrocautery.   We obtained meticulous  hemostasis. We removed a strip of orbicularis   oculi muscle along the tarsal edge. We then went ahead and removed  fat from    the middle and medial fat pads of the upper eyes bilaterally. At this    point, we obtained meticulous hemostasis.  The orbicularis oculi muscle  was reapproximated using 6-0 Vicryl suture .  We then closed the skin with   6-0 Prolene suture.      The lower eyes were marked with a marking pen along a subciliary incision line .   The lower eyes were injected with 1% lidocaine with epinephrine. We made  a skin only incision elevating the skin inferiorly for approximately 8 mm. Once     this was done, we then dived down below the orbicularis ocular muscle    just above the orbital septum. We dissected all the way down to the    infraorbital rim. At this point, we opened up the orbital septum along    the inferior leading edge. The fat was then identified in the medial, middle,  lateral compartments.  The small amount of fat was then removed.  We   reapproximated the orbital septum with 6-0 Vicryl suture.  After this was done,   we obtained meticulous hemostasis. We redraped the eyelid skin. We removed  a 1-mm pinch excision of lower eyelid skin.  The lateral canthus was anchored   to the lateral orbital wall with 4-0 PDS suture.  The muscle layer was approximated   using 6-0 Vicryl suture. The skin was then sutured to together with 6-0 chromic   sutures. The patient tolerated procedure well and was awakened from surgery,    dressed with antibiotic ointment and taken to the recovery room in  stable condition.      All counts correct x2.    I was present for the entire procedure.    Patient Disposition:  PACU              SIGNATURE: Cuong Brambila MD  DATE: February 17, 2025  TIME: 2:12 PM

## 2025-02-17 NOTE — DISCHARGE INSTR - AVS FIRST PAGE
[facility].    [facility], [address] [place] [address], [place] [address]   [phone] / [phone] / [email or web address]  Home Instructions for the Blepharoplasty Patient      Please call the office today to schedule a post operative appointment, and tell the office staff  that you doctor needs see you in our office in 7-10 days.      Keep ice on for the first 5 days (20minutes on, 20 minutes off) as much as possible.  A bag of frozen vegetables works great.    Apply a thin layer of the ophthalmic  ointment that you have been prescribed to the suture line three times  a day.    Keep your head elevated on 2 - 3 pillows, especially when sleeping for  the first three days.  Patients find sleeping  in a recliner or using 2 - 3 pillows will help decreased bruising and swelling.    Do not sleep on your sides until the sutures are removed.    You will be given a prescription for pain medicine.  You can use extra strength Tylenol, Advil, or Aleve as substitute.  Follow directions on the bottle.  Do not take any aspirin or medication containing aspirin for two weeks following surgery.    Cosmetics may be applied after your sutures are removed, unless otherwise instructed.    No excessive sun exposure for 6 weeks  following your surgery, the use of protective sunscreen lotion thereafter at all times will be necessary.    You may attempt to wear your contact lenses after instructed by your doctor, however if they are not comfortable, remove them at once and wait one or two days before trying again.    Do not bend, lift or train until instructed.    Do not smoke.    Swelling and discoloration is expected, as is uneven swelling (more on one side than the other). Mild visual blurring for a week or two post surgery is not uncommon. If your eyes become uncomfortable, such as a burning or scratching pain, please report this immediately.    Do not drive until instructed to  do so, usually one week postoperatively.    You may shower 24 hours after surgery unless otherwise instructed.    If you have any questions, mary gracee call the office at [phone].

## 2025-02-17 NOTE — ANESTHESIA POSTPROCEDURE EVALUATION
Post-Op Assessment Note    CV Status:  Stable  Pain Score: 0    Pain management: adequate    Multimodal analgesia used between 6 hours prior to anesthesia start to PACU discharge    Mental Status:  Sleepy   Hydration Status:  Stable   PONV Controlled:  None   Airway Patency:  Patent  Airway: intubated  There is a medical reason for not screening for obstructive sleep apnea and/or for not using two or more mitigation strategies   Post Op Vitals Reviewed: Yes    No anethesia notable event occurred.    Staff: CRNA           Last Filed PACU Vitals:  Vitals Value Taken Time   Temp 99.2 °F (37.3 °C) 02/17/25 1407   Pulse 78 02/17/25 1413   /62 02/17/25 1407   Resp 0 02/17/25 1413   SpO2 99 % 02/17/25 1413   Vitals shown include unfiled device data.    Modified Taran:     Vitals Value Taken Time   Activity 2 02/17/25 1407   Respiration 2 02/17/25 1407   Circulation 2 02/17/25 1407   Consciousness 1 02/17/25 1407   Oxygen Saturation 1 02/17/25 1407     Modified Taran Score: 8

## 2025-02-17 NOTE — NURSING NOTE
Pt returned to APU awake,alert,medicated for 6/10 operative pain. Assisted OOB to BR, voided. Mother at bedside.Taking po. HOB elevated some bruising and swelling around the eyes. Ice pack on and off intermittently.  
No

## 2025-02-17 NOTE — ANESTHESIA POSTPROCEDURE EVALUATION
Post-Op Assessment Note    CV Status:  Stable  Pain Score: 2    Pain management: adequate       Mental Status:  Alert and awake   Hydration Status:  Euvolemic   PONV Controlled:  Controlled   Airway Patency:  Patent     Post Op Vitals Reviewed: Yes    No anethesia notable event occurred.    Staff: Anesthesiologist           Last Filed PACU Vitals:  Vitals Value Taken Time   Temp 99.2 °F (37.3 °C) 02/17/25 1407   Pulse 81 02/17/25 1437   /56 02/17/25 1431   Resp 6 02/17/25 1437   SpO2 95 % 02/17/25 1437   Vitals shown include unfiled device data.    Modified Taran:     Vitals Value Taken Time   Activity 2 02/17/25 1430   Respiration 2 02/17/25 1430   Circulation 2 02/17/25 1430   Consciousness 2 02/17/25 1430   Oxygen Saturation 2 02/17/25 1430     Modified Taran Score: 10

## 2025-02-17 NOTE — DISCHARGE SUMMARY
Discharge Summary - Plastic Surgery   Name: Rekha Sawyer 44 y.o. female I MRN: [id number]  Unit/Bed#: OR POOL I Date of Admission: 2/17/2025   Date of Service: 2/17/2025 I Hospital Day: 0    PLASTIC, RECONSTRUCTIVE, & HAND SURGERY   Discharge Summary  Date of Admission:   2/17/2025  Date of Discharge:   02/17/25  Attending:  Cuong Brambila MD  Principal/Final Diagnosis:   Blepharochalasis of upper and lower eyelids of both eyes [H02.31, H02.32, H02.35, H02.34]  Principal Procedure:   BLEPHAROPLASTY UPPER LID (Bilateral: Eye)  BLEPHAROPLASTY LOWER LID (Bilateral: Eye)  Discharge Medications:  See after visit summary for reconciled discharge medications provided to patient and family.  Reason for Admission:  Rekha Sawyer was electively admitted to undergo the above named procedure on 2/17/2025 as an outpatient.  Hospital Course:  Patient underwent the above named procedure on the day of admission without complications. They were discharged home the same day.  Disposition:  To home in care of self and family.  Condition:  Good  Follow up:  Patient with follow up in the office with Dr. Cuong Brambila MD in 1 week(s) or as scheduled per his office  Cuong Brambila MD  2/17/2025 11:25 AM

## 2025-02-17 NOTE — INTERVAL H&P NOTE
H&P reviewed. After examining the patient I find no changes in the patients condition since the H&P had been written.    Vitals:    02/17/25 0945   BP: (!) 85/66   Pulse: 75   Resp: 18   Temp: 98.1 °F (36.7 °C)   SpO2: 100%

## 2025-06-13 ENCOUNTER — TELEPHONE (OUTPATIENT)
Age: 45
End: 2025-06-13

## 2025-06-13 NOTE — TELEPHONE ENCOUNTER
[facility] is requesting diagnosis notes for ADHD. Patient was diagnosed with ADHD in early 2000 by Dr. Pisano.  Please fax notes to Fax # [phone]  Thank you!!

## 2025-06-16 ENCOUNTER — OFFICE VISIT (OUTPATIENT)
Dept: FAMILY MEDICINE CLINIC | Facility: CLINIC | Age: 45
End: 2025-06-16
Payer: COMMERCIAL

## 2025-06-16 VITALS
WEIGHT: 131.2 LBS | HEART RATE: 60 BPM | BODY MASS INDEX: 20.59 KG/M2 | OXYGEN SATURATION: 99 % | DIASTOLIC BLOOD PRESSURE: 78 MMHG | SYSTOLIC BLOOD PRESSURE: 120 MMHG | TEMPERATURE: 97.9 F | HEIGHT: 67 IN

## 2025-06-16 DIAGNOSIS — F41.9 ANXIETY: ICD-10-CM

## 2025-06-16 DIAGNOSIS — F19.10 POLYSUBSTANCE ABUSE (HCC): ICD-10-CM

## 2025-06-16 DIAGNOSIS — Z00.00 ANNUAL PHYSICAL EXAM: ICD-10-CM

## 2025-06-16 DIAGNOSIS — Z12.31 ENCOUNTER FOR SCREENING MAMMOGRAM FOR BREAST CANCER: ICD-10-CM

## 2025-06-16 DIAGNOSIS — F90.0 ATTENTION DEFICIT HYPERACTIVITY DISORDER (ADHD), PREDOMINANTLY INATTENTIVE TYPE: ICD-10-CM

## 2025-06-16 DIAGNOSIS — F33.1 MODERATE EPISODE OF RECURRENT MAJOR DEPRESSIVE DISORDER (HCC): ICD-10-CM

## 2025-06-16 DIAGNOSIS — G25.0 ESSENTIAL TREMOR: ICD-10-CM

## 2025-06-16 DIAGNOSIS — Z00.00 HEALTH MAINTENANCE EXAMINATION: Primary | ICD-10-CM

## 2025-06-16 DIAGNOSIS — Z12.4 SCREENING FOR CERVICAL CANCER: ICD-10-CM

## 2025-06-16 PROCEDURE — 99214 OFFICE O/P EST MOD 30 MIN: CPT | Performed by: FAMILY MEDICINE

## 2025-06-16 PROCEDURE — 99396 PREV VISIT EST AGE 40-64: CPT | Performed by: FAMILY MEDICINE

## 2025-06-16 RX ORDER — ARIPIPRAZOLE 5 MG/1
TABLET ORAL
COMMUNITY
Start: 2025-06-11 | End: 2025-06-16

## 2025-06-16 RX ORDER — LISDEXAMFETAMINE DIMESYLATE 40 MG/1
40 CAPSULE ORAL EVERY MORNING
Qty: 30 CAPSULE | Refills: 0 | Status: SHIPPED | OUTPATIENT
Start: 2025-06-16

## 2025-06-16 RX ORDER — BUPROPION HYDROCHLORIDE 300 MG/1
300 TABLET ORAL EVERY MORNING
Qty: 90 TABLET | Refills: 3 | Status: SHIPPED | OUTPATIENT
Start: 2025-06-16

## 2025-06-16 RX ORDER — PROPRANOLOL HCL 20 MG
20 TABLET ORAL DAILY
Qty: 90 TABLET | Refills: 1 | Status: SHIPPED | OUTPATIENT
Start: 2025-06-16

## 2025-06-16 NOTE — ASSESSMENT & PLAN NOTE
Orders:    lisdexamfetamine (Vyvanse) 40 MG capsule; Take 1 capsule (40 mg total) by mouth every morning Max Daily Amount: 40 mg

## 2025-06-16 NOTE — PROGRESS NOTES
"Name: Rekha Sawyer      : 1980      MRN: [id number]  Encounter Provider: Blas Pisano MD  Encounter Date: 2025   Encounter department: [facility]    Assessment & Plan  Health maintenance examination         Attention deficit hyperactivity disorder (ADHD), predominantly inattentive type    Orders:    lisdexamfetamine (Vyvanse) 40 MG capsule; Take 1 capsule (40 mg total) by mouth every morning Max Daily Amount: 40 mg    Moderate episode of recurrent major depressive disorder (HCC)      Orders:    buPROPion (WELLBUTRIN XL) 300 mg 24 hr tablet; Take 1 tablet (300 mg total) by mouth every morning    Essential tremor         Anxiety    Orders:    propranolol (INDERAL) 20 mg tablet; Take 1 tablet (20 mg total) by mouth in the morning    Annual physical exam         Encounter for screening mammogram for breast cancer    Orders:    Mammo screening bilateral w 3d and cad; Future    Screening for cervical cancer    Orders:    Ambulatory referral to Obstetrics / Gynecology; Future    Polysubstance abuse (HCC)            Patient Instructions   Health maintenance is performed.  Appears to be doing great.  Cravings controlled with Brixadi.  Continue bupropion.  Restart Vyvanse at 40 mg daily for ADHD.  Continue propranolol.  May call for refill in 1 month.  Prescription placed for mammogram.  Referral to GYN for Pap smear.  Recheck in 2 months.  Patient Education     Routine physical for adults   The Basics   Written by the doctors and editors at [employer]   What is a physical? -- A physical is a routine visit, or \"check-up,\" with your doctor. You might also hear it called a \"wellness visit\" or \"preventive visit.\"  During each visit, the doctor will:   Ask about your physical and mental health   Ask about your habits, behaviors, and lifestyle   Do an exam   Give you vaccines if needed   Talk to you about any medicines you take   Give advice about your health   Answer your questions  Getting " "regular check-ups is an important part of taking care of your health. It can help your doctor find and treat any problems you have. But it's also important for preventing health problems.  A routine physical is different from a \"sick visit.\" A sick visit is when you see a doctor because of a health concern or problem. Since physicals are scheduled ahead of time, you can think about what you want to ask the doctor.  How often should I get a physical? -- It depends on your age and health. In general, for people age 21 years and older:   If you are younger than 50 years, you might be able to get a physical every 3 years.   If you are 50 years or older, your doctor might recommend a physical every year.  If you have an ongoing health condition, like diabetes or high blood pressure, your doctor will probably want to see you more often.  What happens during a physical? -- In general, each visit will include:   Physical exam - The doctor or nurse will check your height, weight, heart rate, and blood pressure. They will also look at your eyes and ears. They will ask about how you are feeling and whether you have any symptoms that bother you.   Medicines - It's a good idea to bring a list of all the medicines you take to each doctor visit. Your doctor will talk to you about your medicines and answer any questions. Tell them if you are having any side effects that bother you. You should also tell them if you are having trouble paying for any of your medicines.   Habits and behaviors - This includes:   Your diet   Your exercise habits   Whether you smoke, drink alcohol, or use drugs   Whether you are sexually active   Whether you feel safe at home  Your doctor will talk to you about things you can do to improve your health and lower your risk of health problems. They will also offer help and support. For example, if you want to quit smoking, they can give you advice and might prescribe medicines. If you want to improve your " "diet or get more physical activity, they can help you with this, too.   Lab tests, if needed - The tests you get will depend on your age and situation. For example, your doctor might want to check your:   Cholesterol   Blood sugar   Iron level   Vaccines - The recommended vaccines will depend on your age, health, and what vaccines you already had. Vaccines are very important because they can prevent certain serious or deadly infections.   Discussion of screening - \"Screening\" means checking for diseases or other health problems before they cause symptoms. Your doctor can recommend screening based on your age, risk, and preferences. This might include tests to check for:   Cancer, such as breast, prostate, cervical, ovarian, colorectal, prostate, lung, or skin cancer   Sexually transmitted infections, such as chlamydia and gonorrhea   Mental health conditions like depression and anxiety  Your doctor will talk to you about the different types of screening tests. They can help you decide which screenings to have. They can also explain what the results might mean.   Answering questions - The physical is a good time to ask the doctor or nurse questions about your health. If needed, they can refer you to other doctors or specialists, too.  Adults older than 65 years often need other care, too. As you get older, your doctor will talk to you about:   How to prevent falling at home   Hearing or vision tests   Memory testing   How to take your medicines safely   Making sure that you have the help and support you need at home  All topics are updated as new evidence becomes available and our peer review process is complete.  This topic retrieved from [employer] on: May 02, 2024.  Topic 450743 Version 1.0  Release: 32.4.3 - C32.122  © 2024 UpToDate, Inc. and/or its affiliates. All rights reserved.  Consumer Information Use and Disclaimer   Disclaimer: This generalized information is a limited summary of diagnosis, treatment, " and/or medication information. It is not meant to be comprehensive and should be used as a tool to help the user understand and/or assess potential diagnostic and treatment options. It does NOT include all information about conditions, treatments, medications, side effects, or risks that may apply to a specific patient. It is not intended to be medical advice or a substitute for the medical advice, diagnosis, or treatment of a health care provider based on the health care provider's examination and assessment of a patient's specific and unique circumstances. Patients must speak with a health care provider for complete information about their health, medical questions, and treatment options, including any risks or benefits regarding use of medications. This information does not endorse any treatments or medications as safe, effective, or approved for treating a specific patient. UpToDate, Inc. and its affiliates disclaim any warranty or liability relating to this information or the use thereof.The use of this information is governed by the Terms of Use, available at https://www.[employer].com/en/know/clinical-effectiveness-terms. 2024© UpToDate, Inc. and its affiliates and/or licensors. All rights reserved.  Copyright   © 2024 UpToDate, Inc. and/or its affiliates. All rights reserved.        History of Present Illness     HPI  Here for annual physical exam and follow-up of depression and ADHD.  Doing well now.  About 1 year ago, was not doing so well.  Had a mental breakdown.  Went to rehab for about 1 month at [facility].  Was able to get off kratom and alcohol.  It was suggested that she not go back on ADHD medicine for a year.  It has been a year.  She continues on bupropion.  Uses clonazepam as needed.  Propranolol for tremor.  Would like to go back on ADHD medicine as she struggles to focus.  Says she is in a good place now.  Jobs go well.  Has a boyfriend whom she likes and is not crazy.  He is 2 years  older with 2 kids of his own.  Has been receiving Brixadi injections to prevent her from having cravings for the kratom.  Currently at 96 mg.  Plan is to lower the dose for 6 months and then try to stop.  Started at rehab.  She is followed by Lucy Kemp who is with [facility].      Review of Systems    Past Medical History[1]  Past Surgical History[2]  Social History     Social History Narrative    Was  in  to [place].  after a rough couple of years.  He overdosed and  .    .    Boyfriend, Filippo, since .  He is a .        Daughter, Flor, 7 as of .    Has an apartment in [place].    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         Became a certified holistic health .     Enjoys hiking, yoga.        Went to rehab for 1 month in  after breakdown. [facility]. Was able to stop alcohol and kratom after her rehab stay.     Medications[3]  Allergies   Allergen Reactions    Cephalexin Nausea Only      With abdominal pain    Other Other (See Comments)    Penicillins Other (See Comments)     unknown    Vancomycin Hives     hives    Penicillins Rash    Wheat Bran - Food Allergy GI Intolerance and Rash     Immunization History   Administered Date(s) Administered    DTP 1982, 1982, 1982, 1983, 1986    HPV9 2024    Hep B, Adolescent or Pediatric 1996, 10/24/1996, 1997    Hib (PRP-T) 1985    INFLUENZA 2014, 10/13/2016, 10/01/2018, 2019    IPV 1982, 1982, 1982, 1983, 1985    Influenza Quadrivalent Preservative Free 3 years and older IM 10/24/2017    Influenza, seasonal, injectable 2016, 10/02/2016    MMR 1983, 2000    Meningococcal ACWY, unspecified 2010    Meningococcal MCV4P 2000, 2010    Pneumococcal 2015    Rho (D) Immune Globulin 2016    Td (adult), Unspecified 1996    Tdap  "01/01/2014, 05/03/2017    Tuberculin Skin Test-PPD Intradermal 06/14/2016, 06/14/2016, 06/22/2016, 06/22/2016    Varicella 01/01/2014     Objective   /78 (BP Location: Left arm, Patient Position: Sitting, Cuff Size: Standard)   Pulse 60   Temp 97.9 °F (36.6 °C) (Temporal)   Ht 5' 7\" (1.702 m)   Wt 59.5 kg (131 lb 3.2 oz)   SpO2 99%   BMI 20.55 kg/m²     Physical Exam  Healthy appearing individual in no acute distress.  Extraocular motions are intact.  Both ear drums are white.  Hearing is grossly intact.  Throat reveals no erythema.  Teeth are in good repair.  No neck nodes or thyromegaly.  Lungs are clear.  Heart regular with no murmurs or gallops.  Abdomen is soft and nontender.  No leg edema.  Skin reveals no apparent rash.  Neurologic grossly within normal limits.  Normal mood and affect.  Musculoskeletal exam grossly within normal limits.           [1]   Past Medical History:  Diagnosis Date    Anxiety     Attention deficit hyperactivity disorder (ADHD), predominantly inattentive type 07/31/2015    Cystic fibrosis carrier 05/24/2021    Formatting of this note might be different from the original. Partner declined Testing    Depression     Essential tremor 05/01/2023    IBS (irritable bowel syndrome)     Panic attacks 10/01/2021    Polysubstance abuse (HCC) 06/17/2025    Kratom and alcohol.  Rehab 6/24 at [facility]      PTSD (post-traumatic stress disorder) 10/17/2022    Raynaud's disease     Recurrent major depressive disorder, in full remission (HCC) 11/05/2018    2 episodes of after hospitalization in same place at the retreat in [place]    Restless leg syndrome 11/05/2018   [2]   Past Surgical History:  Procedure Laterality Date    AUGMENTATION BREAST  2013    AUGMENTATION MAMMAPLASTY      2 capsular contractures on left corrected, most recent 2015.    VT BLEPHAROPLASTY LOWER EYELID Bilateral 2/17/2025    Procedure: BLEPHAROPLASTY LOWER LID;  Surgeon: Cuong Brambila MD;  Location:  MAIN OR; "  Service: Plastics    OR BLEPHAROPLASTY UPPER EYELID W/EXCESSIVE SKIN Bilateral 2/17/2025    Procedure: BLEPHAROPLASTY UPPER LID;  Surgeon: Cuong Brambila MD;  Location:  MAIN OR;  Service: Plastics    SHOULDER SURGERY Right     Two surgeries to repair recurrent dislocation    WISDOM TOOTH EXTRACTION     [3]   Current Outpatient Medications on File Prior to Visit   Medication Sig    Buprenorphine ER (Brixadi) 96 MG/0.27ML Inject 96 mg under the skin every 30 (thirty) days    clonazePAM (KlonoPIN) 0.5 mg tablet Take 1 tablet (0.5 mg total) by mouth 2 (two) times a day as needed for anxiety    ibuprofen (MOTRIN) 600 mg tablet Take 600 mg by mouth every 6 (six) hours as needed    Multiple Vitamins-Minerals (MULTIVITAMIN ADULT PO) Take by mouth in the morning.    Omega-3 Fatty Acids (FISH OIL) 1,000 mg Take 1,000 mg by mouth in the morning.    Progesterone 100 MG CAPS Take 1 capsule by mouth daily at bedtime    Turmeric 500 MG TABS Take by mouth

## 2025-06-16 NOTE — ASSESSMENT & PLAN NOTE
Orders:    propranolol (INDERAL) 20 mg tablet; Take 1 tablet (20 mg total) by mouth in the morning

## 2025-06-16 NOTE — PATIENT INSTRUCTIONS
"Health maintenance is performed.  Appears to be doing great.  Cravings controlled with Brixadi.  Continue bupropion.  Restart Vyvanse at 40 mg daily for ADHD.  Continue propranolol.  May call for refill in 1 month.  Prescription placed for mammogram.  Referral to GYN for Pap smear.  Recheck in 2 months.  Patient Education     Routine physical for adults   The Basics   Written by the doctors and editors at [facility]   What is a physical? -- A physical is a routine visit, or \"check-up,\" with your doctor. You might also hear it called a \"wellness visit\" or \"preventive visit.\"  During each visit, the doctor will:   Ask about your physical and mental health   Ask about your habits, behaviors, and lifestyle   Do an exam   Give you vaccines if needed   Talk to you about any medicines you take   Give advice about your health   Answer your questions  Getting regular check-ups is an important part of taking care of your health. It can help your doctor find and treat any problems you have. But it's also important for preventing health problems.  A routine physical is different from a \"sick visit.\" A sick visit is when you see a doctor because of a health concern or problem. Since physicals are scheduled ahead of time, you can think about what you want to ask the doctor.  How often should I get a physical? -- It depends on your age and health. In general, for people age 21 years and older:   If you are younger than 50 years, you might be able to get a physical every 3 years.   If you are 50 years or older, your doctor might recommend a physical every year.  If you have an ongoing health condition, like diabetes or high blood pressure, your doctor will probably want to see you more often.  What happens during a physical? -- In general, each visit will include:   Physical exam - The doctor or nurse will check your height, weight, heart rate, and blood pressure. They will also look at your eyes and ears. They will ask about how you " "are feeling and whether you have any symptoms that bother you.   Medicines - It's a good idea to bring a list of all the medicines you take to each doctor visit. Your doctor will talk to you about your medicines and answer any questions. Tell them if you are having any side effects that bother you. You should also tell them if you are having trouble paying for any of your medicines.   Habits and behaviors - This includes:   Your diet   Your exercise habits   Whether you smoke, drink alcohol, or use drugs   Whether you are sexually active   Whether you feel safe at home  Your doctor will talk to you about things you can do to improve your health and lower your risk of health problems. They will also offer help and support. For example, if you want to quit smoking, they can give you advice and might prescribe medicines. If you want to improve your diet or get more physical activity, they can help you with this, too.   Lab tests, if needed - The tests you get will depend on your age and situation. For example, your doctor might want to check your:   Cholesterol   Blood sugar   Iron level   Vaccines - The recommended vaccines will depend on your age, health, and what vaccines you already had. Vaccines are very important because they can prevent certain serious or deadly infections.   Discussion of screening - \"Screening\" means checking for diseases or other health problems before they cause symptoms. Your doctor can recommend screening based on your age, risk, and preferences. This might include tests to check for:   Cancer, such as breast, prostate, cervical, ovarian, colorectal, prostate, lung, or skin cancer   Sexually transmitted infections, such as chlamydia and gonorrhea   Mental health conditions like depression and anxiety  Your doctor will talk to you about the different types of screening tests. They can help you decide which screenings to have. They can also explain what the results might mean.   Answering " questions - The physical is a good time to ask the doctor or nurse questions about your health. If needed, they can refer you to other doctors or specialists, too.  Adults older than 65 years often need other care, too. As you get older, your doctor will talk to you about:   How to prevent falling at home   Hearing or vision tests   Memory testing   How to take your medicines safely   Making sure that you have the help and support you need at home  All topics are updated as new evidence becomes available and our peer review process is complete.  This topic retrieved from [employer] on: May 02, 2024.  Topic 631842 Version 1.0  Release: 32.4.3 - C32.122  © 2024 UpToDate, Inc. and/or its affiliates. All rights reserved.  Consumer Information Use and Disclaimer   Disclaimer: This generalized information is a limited summary of diagnosis, treatment, and/or medication information. It is not meant to be comprehensive and should be used as a tool to help the user understand and/or assess potential diagnostic and treatment options. It does NOT include all information about conditions, treatments, medications, side effects, or risks that may apply to a specific patient. It is not intended to be medical advice or a substitute for the medical advice, diagnosis, or treatment of a health care provider based on the health care provider's examination and assessment of a patient's specific and unique circumstances. Patients must speak with a health care provider for complete information about their health, medical questions, and treatment options, including any risks or benefits regarding use of medications. This information does not endorse any treatments or medications as safe, effective, or approved for treating a specific patient. UpToDate, Inc. and its affiliates disclaim any warranty or liability relating to this information or the use thereof.The use of this information is governed by the Terms of Use, available at  https://www.[employer]uwer.com/en/know/clinical-effectiveness-terms. 2024© [employer] and its affiliates and/or licensors. All rights reserved.  Copyright   © 2024 [employer] and/or its affiliates. All rights reserved.

## 2025-06-16 NOTE — ASSESSMENT & PLAN NOTE
Orders:    buPROPion (WELLBUTRIN XL) 300 mg 24 hr tablet; Take 1 tablet (300 mg total) by mouth every morning

## 2025-06-17 PROBLEM — F19.10 POLYSUBSTANCE ABUSE (HCC): Status: ACTIVE | Noted: 2025-06-17

## 2025-06-17 RX ORDER — BUPRENORPHINE 96 MG/.27ML
96 INJECTION SUBCUTANEOUS
COMMUNITY

## 2025-07-22 DIAGNOSIS — F90.0 ATTENTION DEFICIT HYPERACTIVITY DISORDER (ADHD), PREDOMINANTLY INATTENTIVE TYPE: ICD-10-CM

## 2025-07-22 RX ORDER — LISDEXAMFETAMINE DIMESYLATE 40 MG/1
40 CAPSULE ORAL EVERY MORNING
Qty: 30 CAPSULE | Refills: 0 | Status: SHIPPED | OUTPATIENT
Start: 2025-07-22

## 2025-07-22 NOTE — TELEPHONE ENCOUNTER
Medication: vyvance      Pharmacy: [facility]    Office:   [x] PCP/Provider -   [] Speciality/Provider -     Does the patient have enough for 3 days?   [] Yes   [x] No - Send as HP to POD  2 pills left

## 2025-08-18 ENCOUNTER — TELEMEDICINE (OUTPATIENT)
Dept: FAMILY MEDICINE CLINIC | Facility: CLINIC | Age: 45
End: 2025-08-18
Payer: COMMERCIAL

## 2025-08-18 DIAGNOSIS — F41.9 ANXIETY: ICD-10-CM

## 2025-08-18 DIAGNOSIS — F90.0 ATTENTION DEFICIT HYPERACTIVITY DISORDER (ADHD), PREDOMINANTLY INATTENTIVE TYPE: Primary | ICD-10-CM

## 2025-08-18 DIAGNOSIS — F33.1 MODERATE EPISODE OF RECURRENT MAJOR DEPRESSIVE DISORDER (HCC): ICD-10-CM

## 2025-08-18 PROBLEM — F19.10 POLYSUBSTANCE ABUSE (HCC): Status: RESOLVED | Noted: 2025-06-17 | Resolved: 2025-08-18

## 2025-08-18 PROCEDURE — 99214 OFFICE O/P EST MOD 30 MIN: CPT | Performed by: FAMILY MEDICINE

## 2025-08-18 RX ORDER — DEXTROAMPHETAMINE SACCHARATE, AMPHETAMINE ASPARTATE, DEXTROAMPHETAMINE SULFATE AND AMPHETAMINE SULFATE 2.5; 2.5; 2.5; 2.5 MG/1; MG/1; MG/1; MG/1
10 TABLET ORAL AS NEEDED
Qty: 30 TABLET | Refills: 0 | Status: SHIPPED | OUTPATIENT
Start: 2025-08-18

## 2025-08-18 RX ORDER — ARIPIPRAZOLE 5 MG/1
1 TABLET ORAL DAILY
COMMUNITY
Start: 2025-07-13

## 2025-08-18 RX ORDER — LISDEXAMFETAMINE DIMESYLATE 40 MG/1
40 CAPSULE ORAL EVERY MORNING
Qty: 30 CAPSULE | Refills: 0 | Status: SHIPPED | OUTPATIENT
Start: 2025-08-18

## (undated) DEVICE — SCD SEQUENTIAL COMPRESSION COMFORT SLEEVE MEDIUM KNEE LENGTH: Brand: KENDALL SCD

## (undated) DEVICE — NEEDLE 25G X 1 1/2

## (undated) DEVICE — REM POLYHESIVE ADULT PATIENT RETURN ELECTRODE: Brand: VALLEYLAB

## (undated) DEVICE — STERILE POLYISOPRENE POWDER-FREE SURGICAL GLOVES: Brand: PROTEXIS

## (undated) DEVICE — LAPAROTOMY SPONGE - RF AND X-RAY DETECTABLE PRE-WASHED: Brand: SITUATE

## (undated) DEVICE — SUT VICRYL 6-0 P-3 18 IN J492G

## (undated) DEVICE — ELECTRODE NEEDLE MOD E-Z CLEAN 2.75IN 7CM -0013M

## (undated) DEVICE — NEEDLE BLUNT 18 G X 1 1/2IN

## (undated) DEVICE — SYRINGE 30ML LL

## (undated) DEVICE — DECANTER: Brand: UNBRANDED

## (undated) DEVICE — TIBURON SPLIT SHEET: Brand: CONVERTORS

## (undated) DEVICE — PENCIL ELECTROSURG E-Z CLEAN -0035H

## (undated) DEVICE — TELFA NON-ADHERENT ABSORBENT DRESSING: Brand: TELFA

## (undated) DEVICE — INTENDED FOR TISSUE SEPARATION, AND OTHER PROCEDURES THAT REQUIRE A SHARP SURGICAL BLADE TO PUNCTURE OR CUT.: Brand: BARD-PARKER ® SAFETYLOCK CARBON RIB-BACK BLADES

## (undated) DEVICE — PREMIUM DRY TRAY LF: Brand: MEDLINE INDUSTRIES, INC.

## (undated) DEVICE — ICE PACK EYE

## (undated) DEVICE — DISPOSABLE OR TOWEL: Brand: CARDINAL HEALTH

## (undated) DEVICE — SUT PROLENE 6-0 P-3 18 IN 8695G

## (undated) DEVICE — BETHLEHEM UNIVERSAL OUTPATIENT: Brand: CARDINAL HEALTH